# Patient Record
Sex: MALE | Race: WHITE | NOT HISPANIC OR LATINO | URBAN - METROPOLITAN AREA
[De-identification: names, ages, dates, MRNs, and addresses within clinical notes are randomized per-mention and may not be internally consistent; named-entity substitution may affect disease eponyms.]

---

## 2017-07-18 ENCOUNTER — INPATIENT (INPATIENT)
Facility: HOSPITAL | Age: 52
LOS: 0 days | Discharge: HOME | End: 2017-07-19
Attending: EMERGENCY MEDICINE

## 2017-07-18 DIAGNOSIS — R07.9 CHEST PAIN, UNSPECIFIED: ICD-10-CM

## 2017-07-18 DIAGNOSIS — R07.89 OTHER CHEST PAIN: ICD-10-CM

## 2017-07-18 DIAGNOSIS — F17.210 NICOTINE DEPENDENCE, CIGARETTES, UNCOMPLICATED: ICD-10-CM

## 2017-07-18 DIAGNOSIS — I10 ESSENTIAL (PRIMARY) HYPERTENSION: ICD-10-CM

## 2017-07-18 DIAGNOSIS — Z82.49 FAMILY HISTORY OF ISCHEMIC HEART DISEASE AND OTHER DISEASES OF THE CIRCULATORY SYSTEM: ICD-10-CM

## 2017-07-21 PROBLEM — Z00.00 ENCOUNTER FOR PREVENTIVE HEALTH EXAMINATION: Status: ACTIVE | Noted: 2017-07-21

## 2017-07-31 ENCOUNTER — OTHER (OUTPATIENT)
Age: 52
End: 2017-07-31

## 2017-07-31 ENCOUNTER — APPOINTMENT (OUTPATIENT)
Dept: CARDIOLOGY | Facility: CLINIC | Age: 52
End: 2017-07-31

## 2017-07-31 ENCOUNTER — RESULT CHARGE (OUTPATIENT)
Age: 52
End: 2017-07-31

## 2017-07-31 VITALS — HEIGHT: 71 IN

## 2017-07-31 VITALS
HEIGHT: 71 IN | WEIGHT: 187 LBS | HEART RATE: 86 BPM | SYSTOLIC BLOOD PRESSURE: 180 MMHG | DIASTOLIC BLOOD PRESSURE: 90 MMHG | BODY MASS INDEX: 26.18 KG/M2

## 2017-07-31 DIAGNOSIS — R94.31 ABNORMAL ELECTROCARDIOGRAM [ECG] [EKG]: ICD-10-CM

## 2017-07-31 DIAGNOSIS — E78.5 HYPERLIPIDEMIA, UNSPECIFIED: ICD-10-CM

## 2017-07-31 DIAGNOSIS — F17.200 NICOTINE DEPENDENCE, UNSPECIFIED, UNCOMPLICATED: ICD-10-CM

## 2017-07-31 DIAGNOSIS — I10 ESSENTIAL (PRIMARY) HYPERTENSION: ICD-10-CM

## 2017-07-31 DIAGNOSIS — Z82.49 FAMILY HISTORY OF ISCHEMIC HEART DISEASE AND OTHER DISEASES OF THE CIRCULATORY SYSTEM: ICD-10-CM

## 2017-07-31 DIAGNOSIS — Z80.9 FAMILY HISTORY OF MALIGNANT NEOPLASM, UNSPECIFIED: ICD-10-CM

## 2017-07-31 RX ORDER — HYDROCHLOROTHIAZIDE 25 MG/1
25 TABLET ORAL DAILY
Qty: 90 | Refills: 3 | Status: ACTIVE | COMMUNITY

## 2017-08-01 ENCOUNTER — OUTPATIENT (OUTPATIENT)
Dept: OUTPATIENT SERVICES | Facility: HOSPITAL | Age: 52
LOS: 1 days | Discharge: HOME | End: 2017-08-01

## 2017-08-01 DIAGNOSIS — E78.5 HYPERLIPIDEMIA, UNSPECIFIED: ICD-10-CM

## 2017-08-01 DIAGNOSIS — I10 ESSENTIAL (PRIMARY) HYPERTENSION: ICD-10-CM

## 2017-08-09 ENCOUNTER — APPOINTMENT (OUTPATIENT)
Dept: CARDIOLOGY | Facility: CLINIC | Age: 52
End: 2017-08-09

## 2017-11-28 ENCOUNTER — APPOINTMENT (OUTPATIENT)
Dept: CARDIOLOGY | Facility: CLINIC | Age: 52
End: 2017-11-28

## 2017-12-04 ENCOUNTER — APPOINTMENT (OUTPATIENT)
Dept: CARDIOLOGY | Facility: CLINIC | Age: 52
End: 2017-12-04

## 2018-06-14 ENCOUNTER — TRANSCRIPTION ENCOUNTER (OUTPATIENT)
Age: 53
End: 2018-06-14

## 2018-10-15 ENCOUNTER — EMERGENCY (EMERGENCY)
Facility: HOSPITAL | Age: 53
LOS: 0 days | Discharge: HOME | End: 2018-10-16
Attending: EMERGENCY MEDICINE | Admitting: EMERGENCY MEDICINE

## 2018-10-15 VITALS
DIASTOLIC BLOOD PRESSURE: 101 MMHG | HEART RATE: 98 BPM | SYSTOLIC BLOOD PRESSURE: 197 MMHG | TEMPERATURE: 98 F | RESPIRATION RATE: 22 BRPM | WEIGHT: 199.96 LBS | OXYGEN SATURATION: 99 % | HEIGHT: 70 IN

## 2018-10-15 LAB
ALBUMIN SERPL ELPH-MCNC: 4.2 G/DL — SIGNIFICANT CHANGE UP (ref 3.5–5.2)
ALP SERPL-CCNC: 101 U/L — SIGNIFICANT CHANGE UP (ref 30–115)
ALT FLD-CCNC: 66 U/L — HIGH (ref 0–41)
ANION GAP SERPL CALC-SCNC: 13 MMOL/L — SIGNIFICANT CHANGE UP (ref 7–14)
APTT BLD: 38.5 SEC — SIGNIFICANT CHANGE UP (ref 27–39.2)
AST SERPL-CCNC: 50 U/L — HIGH (ref 0–41)
BASOPHILS # BLD AUTO: 0.02 K/UL — SIGNIFICANT CHANGE UP (ref 0–0.2)
BASOPHILS NFR BLD AUTO: 0.2 % — SIGNIFICANT CHANGE UP (ref 0–1)
BILIRUB SERPL-MCNC: 1.2 MG/DL — SIGNIFICANT CHANGE UP (ref 0.2–1.2)
BUN SERPL-MCNC: 12 MG/DL — SIGNIFICANT CHANGE UP (ref 10–20)
CALCIUM SERPL-MCNC: 9 MG/DL — SIGNIFICANT CHANGE UP (ref 8.5–10.1)
CHLORIDE SERPL-SCNC: 99 MMOL/L — SIGNIFICANT CHANGE UP (ref 98–110)
CHOLEST SERPL-MCNC: 173 MG/DL — SIGNIFICANT CHANGE UP (ref 100–200)
CO2 SERPL-SCNC: 27 MMOL/L — SIGNIFICANT CHANGE UP (ref 17–32)
CREAT SERPL-MCNC: 0.9 MG/DL — SIGNIFICANT CHANGE UP (ref 0.7–1.5)
EOSINOPHIL # BLD AUTO: 0.1 K/UL — SIGNIFICANT CHANGE UP (ref 0–0.7)
EOSINOPHIL NFR BLD AUTO: 1.2 % — SIGNIFICANT CHANGE UP (ref 0–8)
GLUCOSE SERPL-MCNC: 104 MG/DL — HIGH (ref 70–99)
HCT VFR BLD CALC: 52.4 % — HIGH (ref 42–52)
HDLC SERPL-MCNC: 67 MG/DL — SIGNIFICANT CHANGE UP
HGB BLD-MCNC: 18.1 G/DL — CRITICAL HIGH (ref 14–18)
IMM GRANULOCYTES NFR BLD AUTO: 0.4 % — HIGH (ref 0.1–0.3)
INR BLD: 1.07 RATIO — SIGNIFICANT CHANGE UP (ref 0.65–1.3)
LIPID PNL WITH DIRECT LDL SERPL: 96 MG/DL — SIGNIFICANT CHANGE UP (ref 4–129)
LYMPHOCYTES # BLD AUTO: 1.95 K/UL — SIGNIFICANT CHANGE UP (ref 1.2–3.4)
LYMPHOCYTES # BLD AUTO: 24.1 % — SIGNIFICANT CHANGE UP (ref 20.5–51.1)
MCHC RBC-ENTMCNC: 32.6 PG — HIGH (ref 27–31)
MCHC RBC-ENTMCNC: 34.5 G/DL — SIGNIFICANT CHANGE UP (ref 32–37)
MCV RBC AUTO: 94.4 FL — HIGH (ref 80–94)
MONOCYTES # BLD AUTO: 0.62 K/UL — HIGH (ref 0.1–0.6)
MONOCYTES NFR BLD AUTO: 7.7 % — SIGNIFICANT CHANGE UP (ref 1.7–9.3)
NEUTROPHILS # BLD AUTO: 5.37 K/UL — SIGNIFICANT CHANGE UP (ref 1.4–6.5)
NEUTROPHILS NFR BLD AUTO: 66.4 % — SIGNIFICANT CHANGE UP (ref 42.2–75.2)
NRBC # BLD: 0 /100 WBCS — SIGNIFICANT CHANGE UP (ref 0–0)
PLATELET # BLD AUTO: 157 K/UL — SIGNIFICANT CHANGE UP (ref 130–400)
POTASSIUM SERPL-MCNC: 3.7 MMOL/L — SIGNIFICANT CHANGE UP (ref 3.5–5)
POTASSIUM SERPL-SCNC: 3.7 MMOL/L — SIGNIFICANT CHANGE UP (ref 3.5–5)
PROT SERPL-MCNC: 7.5 G/DL — SIGNIFICANT CHANGE UP (ref 6–8)
PROTHROM AB SERPL-ACNC: 12.3 SEC — SIGNIFICANT CHANGE UP (ref 9.95–12.87)
RBC # BLD: 5.55 M/UL — SIGNIFICANT CHANGE UP (ref 4.7–6.1)
RBC # FLD: 13 % — SIGNIFICANT CHANGE UP (ref 11.5–14.5)
SODIUM SERPL-SCNC: 139 MMOL/L — SIGNIFICANT CHANGE UP (ref 135–146)
TOTAL CHOLESTEROL/HDL RATIO MEASUREMENT: 2.6 RATIO — LOW (ref 4–5.5)
TRIGL SERPL-MCNC: 95 MG/DL — SIGNIFICANT CHANGE UP (ref 10–149)
TROPONIN T SERPL-MCNC: <0.01 NG/ML — SIGNIFICANT CHANGE UP
WBC # BLD: 8.09 K/UL — SIGNIFICANT CHANGE UP (ref 4.8–10.8)
WBC # FLD AUTO: 8.09 K/UL — SIGNIFICANT CHANGE UP (ref 4.8–10.8)

## 2018-10-15 RX ORDER — SODIUM CHLORIDE 9 MG/ML
1000 INJECTION INTRAMUSCULAR; INTRAVENOUS; SUBCUTANEOUS ONCE
Qty: 0 | Refills: 0 | Status: COMPLETED | OUTPATIENT
Start: 2018-10-15 | End: 2018-10-15

## 2018-10-15 RX ORDER — ACETAMINOPHEN 500 MG
975 TABLET ORAL ONCE
Qty: 0 | Refills: 0 | Status: COMPLETED | OUTPATIENT
Start: 2018-10-15 | End: 2018-10-15

## 2018-10-15 RX ADMIN — Medication 975 MILLIGRAM(S): at 22:00

## 2018-10-15 RX ADMIN — SODIUM CHLORIDE 1000 MILLILITER(S): 9 INJECTION INTRAMUSCULAR; INTRAVENOUS; SUBCUTANEOUS at 22:00

## 2018-10-15 NOTE — ED CDU PROVIDER INITIAL DAY NOTE - ATTENDING CONTRIBUTION TO CARE
54 yo male h/o HTN, current smoker , non-compliant with medications for a long time placed in EDOU for evaluation of intermittent CP..  Pain free at present , will continue with testing.

## 2018-10-15 NOTE — ED PROVIDER NOTE - MEDICAL DECISION MAKING DETAILS
Patient presented with chest pain, low risk. Initial work up in ED negative. Patient had outpatient nuclear stress last year which he reports as normal but patient is non-compliant. Will obs. HD stable, pain free at time of obs placement.

## 2018-10-15 NOTE — ED ADULT NURSE NOTE - OBJECTIVE STATEMENT
Patient c/o of dizziness, CP, high BP x1 day. States he took HCTZ for HTN, but stopped because of increased urination. denies SOB, diaphoresis, and pain radiating to arms.

## 2018-10-15 NOTE — ED PROVIDER NOTE - OBJECTIVE STATEMENT
52 yo M with PMHx of HTN-non-complaint with medication presents to the ED c/o lightheadedness, weakness and intermittent left sided chest pain. Chest pain has been going on for several days but lightheadedness and weakness developed today. Pt admits to recent stressors. He is daily smoker. Pt had normal nuclear stress test in July 2017, has not followed up with cardiology. He admits to associated headache. Pt denies fever, chills, nausea, vomiting, abdominal pain, diarrhea, dizziness, SOB, back pain, LOC, trauma, urinary symptoms, cough, calf pain/swelling, recent travel, recent surgery.

## 2018-10-15 NOTE — ED PROVIDER NOTE - REFUSAL OF SERVICE, MDM
Patient refusing to have stress test because he will only have CT and does not want to wait for CT to be done. Patient wants to go home. Patient states he will follow up with PMD for reevaltion and further treatment

## 2018-10-15 NOTE — ED PROVIDER NOTE - ATTENDING CONTRIBUTION TO CARE
53 year old male, pmhx HTN (non-compliant) presenting with a several day history of substernal chest pain that has been intermittent. Pain is substernal, non-radiating, non-pleuritic, associated with mild dyspnea with exertion. States he had a nuclear stress test 1 year ago which was normal but states he has not followed up with cardiology since then. Admits to mild headache as well but denies fevers, vision changes, weakness/numbness, confusion, URI symptoms, neck pain, back pain, cough, palpitations, nausea, vomiting, abdominal pain, diarrhea, constipation, blood in stool/dark stools, urinary symptoms, penile discharge/testicular pain, leg swelling, rash, recent travel or sick contacts.    Vital Signs: I have reviewed the initial vital signs.  Constitutional: NAD, well-nourished, appears stated age, no acute distress.  HEENT: Airway patent, moist MM, no erythema/swelling/deformity of oral structures. EOMI, PERRLA.  CV: regular rate, regular rhythm, well-perfused extremities, 2+ b/l DP and radial pulses equal.  Lungs: BCTA, no increased WOB.  ABD: NTND, no guarding or rebound, no pulsatile mass, no hernias.   MSK: Neck supple, nontender, nl ROM, no stepoff. Chest nontender. Back nontender in TLS spine or to b/l bony structures or flanks. Ext nontender, nl rom, no deformity.   INTEG: Skin warm, dry, no rash.  NEURO: A&Ox3, CN II-XII intact, normal strength 5/5 all 4 ext, nl sensation throughout, normal speech and coordination.  PSYCH: Calm, cooperative, normal affect and interaction.    Will check labs, EKG, CXR, re-eval.

## 2018-10-15 NOTE — ED PROVIDER NOTE - PHYSICAL EXAMINATION
VITAL SIGNS: I have reviewed nursing notes and confirm.  CONSTITUTIONAL: Well-developed; well-nourished; in no acute distress.  SKIN: Skin exam is warm and dry, no acute rash.  HEAD: Normocephalic; atraumatic.  EYES: Conjunctiva and sclera clear.  ENT: No nasal discharge; airway clear.   NECK: Supple; non tender.  CARD: S1, S2 normal; no murmurs, gallops, or rubs. Regular rate and rhythm.  RESP: No wheezes, rales or rhonchi. Speaking in full sentences.   ABD: Normal bowel sounds; soft; non-distended; non-tender; No rebound or guarding.   EXT: Normal ROM. No clubbing, cyanosis or edema. No calf swelling or TTP.   NEURO: Alert, oriented. Grossly unremarkable. No focal deficits. CN II-XII intact. No dysmetria. Sensation intact throughout. Strength 5/5 throughout. Gait steady.

## 2018-10-15 NOTE — ED CDU PROVIDER INITIAL DAY NOTE - PHYSICAL EXAMINATION
VITAL SIGNS: I have reviewed nursing notes and confirm.  CONSTITUTIONAL: Well-developed; well-nourished; in no acute distress.  SKIN: Skin exam is warm and dry, no acute rash.  HEAD: Normocephalic; atraumatic.  EYES: PERRL, EOM intact; conjunctiva and sclera clear.  ENT: No nasal discharge; airway clear.   NECK: Supple; non tender.  CARD: S1, S2 normal; no murmurs, gallops, or rubs. Regular rate and rhythm.  RESP: No wheezes, rales or rhonchi. Speaking in full sentences.   ABD: Normal bowel sounds; soft; non-distended; non-tender; No rebound or guarding.   EXT: Normal ROM. No clubbing, cyanosis or edema. No calf TTP/swelling.   NEURO: Alert, oriented. Grossly unremarkable. No focal deficits. CN II-XII intact. No dysmetria. No ataxia. Sensation intact throughout. Strength 5/5 throughout. Gait steady.

## 2018-10-16 VITALS — DIASTOLIC BLOOD PRESSURE: 99 MMHG | SYSTOLIC BLOOD PRESSURE: 183 MMHG | HEART RATE: 71 BPM

## 2018-10-16 LAB — TROPONIN T SERPL-MCNC: <0.01 NG/ML — SIGNIFICANT CHANGE UP

## 2018-10-16 RX ORDER — ADENOSINE 3 MG/ML
60 INJECTION INTRAVENOUS ONCE
Qty: 0 | Refills: 0 | Status: DISCONTINUED | OUTPATIENT
Start: 2018-10-16 | End: 2018-10-15

## 2018-10-16 RX ORDER — METOPROLOL TARTRATE 50 MG
50 TABLET ORAL ONCE
Qty: 0 | Refills: 0 | Status: COMPLETED | OUTPATIENT
Start: 2018-10-16 | End: 2018-10-16

## 2018-10-16 RX ORDER — METOCLOPRAMIDE HCL 10 MG
10 TABLET ORAL ONCE
Qty: 0 | Refills: 0 | Status: COMPLETED | OUTPATIENT
Start: 2018-10-16 | End: 2018-10-16

## 2018-10-16 RX ADMIN — Medication 10 MILLIGRAM(S): at 00:54

## 2018-10-16 RX ADMIN — Medication 50 MILLIGRAM(S): at 00:54

## 2018-10-16 NOTE — ED CDU PROVIDER DISPOSITION NOTE - CLINICAL COURSE
52 yo male placed in EDOU for work up of chest pain,  Testing included ECG, cardiac enzymes, CT head ( for headache), patient was given meds for BP control and scheduled for CCTA .  Due to technical difficulties CCTA was cancelled and nuclear test ordered to which patient consented, but later refused ( has capacity).  He had an uneventful EDOU stay, was advised to follow up with his PMD ASAP for management of hypertension; left AMA.

## 2018-10-16 NOTE — ED CDU PROVIDER SUBSEQUENT DAY NOTE - HISTORY
Pt placed in OBS for chest pain. Cardiac enzymes negative x 2. Currently asymptomatic. Pt to go for CCTA.

## 2018-10-16 NOTE — ED CDU PROVIDER SUBSEQUENT DAY NOTE - ATTENDING CONTRIBUTION TO CARE
52 yo male placed in EDOU for evaluation of intermittent chest pain.  Will continue with observation and testing.

## 2018-10-16 NOTE — ED CDU PROVIDER SUBSEQUENT DAY NOTE - PROGRESS NOTE DETAILS
Pt Hx HTN noncompliant with medication, smoker asymptomatic at this time. Patient states CP and SOB has resolved. Awaiting nuclear pharm test since patient received metoprolol for CCTA which was cancelled. Patient is resting comfortably in bed RRR, CTA B/L no LE edema B/L abdomen soft non tender. Endorsed to me this morning.  Appears well, resting comfortably , pain free at present, waiting for provocative testing. Patient refused nuclear stress test, wanted only CT scan. Will leave AMA, risks of leaving AMA including death and disability were explained to the patient, he is A&Ox3 and has capacity.  Aware he can return to ER at any time.

## 2018-10-17 DIAGNOSIS — I10 ESSENTIAL (PRIMARY) HYPERTENSION: ICD-10-CM

## 2018-10-17 DIAGNOSIS — R51 HEADACHE: ICD-10-CM

## 2018-10-17 DIAGNOSIS — R07.89 OTHER CHEST PAIN: ICD-10-CM

## 2018-10-17 DIAGNOSIS — R07.9 CHEST PAIN, UNSPECIFIED: ICD-10-CM

## 2018-10-17 DIAGNOSIS — R53.1 WEAKNESS: ICD-10-CM

## 2018-10-17 DIAGNOSIS — F17.210 NICOTINE DEPENDENCE, CIGARETTES, UNCOMPLICATED: ICD-10-CM

## 2021-02-07 ENCOUNTER — EMERGENCY (EMERGENCY)
Facility: HOSPITAL | Age: 56
LOS: 0 days | Discharge: HOME | End: 2021-02-07
Attending: STUDENT IN AN ORGANIZED HEALTH CARE EDUCATION/TRAINING PROGRAM | Admitting: EMERGENCY MEDICINE
Payer: SELF-PAY

## 2021-02-07 VITALS
SYSTOLIC BLOOD PRESSURE: 210 MMHG | DIASTOLIC BLOOD PRESSURE: 86 MMHG | HEIGHT: 70 IN | OXYGEN SATURATION: 99 % | TEMPERATURE: 98 F | WEIGHT: 186.95 LBS | HEART RATE: 75 BPM | RESPIRATION RATE: 18 BRPM

## 2021-02-07 DIAGNOSIS — F17.200 NICOTINE DEPENDENCE, UNSPECIFIED, UNCOMPLICATED: ICD-10-CM

## 2021-02-07 DIAGNOSIS — Z20.822 CONTACT WITH AND (SUSPECTED) EXPOSURE TO COVID-19: ICD-10-CM

## 2021-02-07 DIAGNOSIS — R07.9 CHEST PAIN, UNSPECIFIED: ICD-10-CM

## 2021-02-07 DIAGNOSIS — E78.5 HYPERLIPIDEMIA, UNSPECIFIED: ICD-10-CM

## 2021-02-07 DIAGNOSIS — I10 ESSENTIAL (PRIMARY) HYPERTENSION: ICD-10-CM

## 2021-02-07 DIAGNOSIS — R07.89 OTHER CHEST PAIN: ICD-10-CM

## 2021-02-07 LAB
ALBUMIN SERPL ELPH-MCNC: 4.1 G/DL — SIGNIFICANT CHANGE UP (ref 3.5–5.2)
ALP SERPL-CCNC: 86 U/L — SIGNIFICANT CHANGE UP (ref 30–115)
ALT FLD-CCNC: 24 U/L — SIGNIFICANT CHANGE UP (ref 0–41)
ANION GAP SERPL CALC-SCNC: 9 MMOL/L — SIGNIFICANT CHANGE UP (ref 7–14)
AST SERPL-CCNC: 24 U/L — SIGNIFICANT CHANGE UP (ref 0–41)
BASOPHILS # BLD AUTO: 0.04 K/UL — SIGNIFICANT CHANGE UP (ref 0–0.2)
BASOPHILS NFR BLD AUTO: 0.6 % — SIGNIFICANT CHANGE UP (ref 0–1)
BILIRUB SERPL-MCNC: 0.5 MG/DL — SIGNIFICANT CHANGE UP (ref 0.2–1.2)
BUN SERPL-MCNC: 14 MG/DL — SIGNIFICANT CHANGE UP (ref 10–20)
CALCIUM SERPL-MCNC: 8.6 MG/DL — SIGNIFICANT CHANGE UP (ref 8.5–10.1)
CHLORIDE SERPL-SCNC: 98 MMOL/L — SIGNIFICANT CHANGE UP (ref 98–110)
CO2 SERPL-SCNC: 25 MMOL/L — SIGNIFICANT CHANGE UP (ref 17–32)
CREAT SERPL-MCNC: 0.9 MG/DL — SIGNIFICANT CHANGE UP (ref 0.7–1.5)
EOSINOPHIL # BLD AUTO: 0.06 K/UL — SIGNIFICANT CHANGE UP (ref 0–0.7)
EOSINOPHIL NFR BLD AUTO: 0.9 % — SIGNIFICANT CHANGE UP (ref 0–8)
GLUCOSE SERPL-MCNC: 104 MG/DL — HIGH (ref 70–99)
HCT VFR BLD CALC: 52.3 % — HIGH (ref 42–52)
HGB BLD-MCNC: 18.5 G/DL — HIGH (ref 14–18)
IMM GRANULOCYTES NFR BLD AUTO: 0.6 % — HIGH (ref 0.1–0.3)
LYMPHOCYTES # BLD AUTO: 1.07 K/UL — LOW (ref 1.2–3.4)
LYMPHOCYTES # BLD AUTO: 16.5 % — LOW (ref 20.5–51.1)
MAGNESIUM SERPL-MCNC: 2 MG/DL — SIGNIFICANT CHANGE UP (ref 1.8–2.4)
MCHC RBC-ENTMCNC: 34.1 PG — HIGH (ref 27–31)
MCHC RBC-ENTMCNC: 35.4 G/DL — SIGNIFICANT CHANGE UP (ref 32–37)
MCV RBC AUTO: 96.5 FL — HIGH (ref 80–94)
MONOCYTES # BLD AUTO: 0.66 K/UL — HIGH (ref 0.1–0.6)
MONOCYTES NFR BLD AUTO: 10.2 % — HIGH (ref 1.7–9.3)
NEUTROPHILS # BLD AUTO: 4.63 K/UL — SIGNIFICANT CHANGE UP (ref 1.4–6.5)
NEUTROPHILS NFR BLD AUTO: 71.2 % — SIGNIFICANT CHANGE UP (ref 42.2–75.2)
NRBC # BLD: 0 /100 WBCS — SIGNIFICANT CHANGE UP (ref 0–0)
PLATELET # BLD AUTO: 36 K/UL — LOW (ref 130–400)
POTASSIUM SERPL-MCNC: 3.8 MMOL/L — SIGNIFICANT CHANGE UP (ref 3.5–5)
POTASSIUM SERPL-SCNC: 3.8 MMOL/L — SIGNIFICANT CHANGE UP (ref 3.5–5)
PROT SERPL-MCNC: 6.9 G/DL — SIGNIFICANT CHANGE UP (ref 6–8)
RBC # BLD: 5.42 M/UL — SIGNIFICANT CHANGE UP (ref 4.7–6.1)
RBC # FLD: 13.9 % — SIGNIFICANT CHANGE UP (ref 11.5–14.5)
SARS-COV-2 RNA SPEC QL NAA+PROBE: SIGNIFICANT CHANGE UP
SODIUM SERPL-SCNC: 132 MMOL/L — LOW (ref 135–146)
TROPONIN T SERPL-MCNC: <0.01 NG/ML — SIGNIFICANT CHANGE UP
WBC # BLD: 6.5 K/UL — SIGNIFICANT CHANGE UP (ref 4.8–10.8)
WBC # FLD AUTO: 6.5 K/UL — SIGNIFICANT CHANGE UP (ref 4.8–10.8)

## 2021-02-07 PROCEDURE — 71046 X-RAY EXAM CHEST 2 VIEWS: CPT | Mod: 26

## 2021-02-07 PROCEDURE — 99220: CPT

## 2021-02-07 PROCEDURE — 93010 ELECTROCARDIOGRAM REPORT: CPT | Mod: 76

## 2021-02-07 PROCEDURE — 70450 CT HEAD/BRAIN W/O DYE: CPT | Mod: 26

## 2021-02-07 RX ORDER — ASPIRIN/CALCIUM CARB/MAGNESIUM 324 MG
325 TABLET ORAL ONCE
Refills: 0 | Status: COMPLETED | OUTPATIENT
Start: 2021-02-07 | End: 2021-02-07

## 2021-02-07 RX ORDER — METOPROLOL TARTRATE 50 MG
100 TABLET ORAL ONCE
Refills: 0 | Status: DISCONTINUED | OUTPATIENT
Start: 2021-02-07 | End: 2021-02-07

## 2021-02-07 RX ORDER — HYDROCHLOROTHIAZIDE 25 MG
25 TABLET ORAL ONCE
Refills: 0 | Status: COMPLETED | OUTPATIENT
Start: 2021-02-07 | End: 2021-02-07

## 2021-02-07 RX ORDER — METOPROLOL TARTRATE 50 MG
100 TABLET ORAL ONCE
Refills: 0 | Status: COMPLETED | OUTPATIENT
Start: 2021-02-07 | End: 2021-02-07

## 2021-02-07 RX ADMIN — Medication 25 MILLIGRAM(S): at 17:05

## 2021-02-07 RX ADMIN — Medication 100 MILLIGRAM(S): at 17:05

## 2021-02-07 RX ADMIN — Medication 325 MILLIGRAM(S): at 15:37

## 2021-02-07 NOTE — ED PROVIDER NOTE - OBJECTIVE STATEMENT
55 y.o male w/ hx of HTN, HLD alcohol abuse presents to the ED for evaluation chest pain and lightheaded sensation.  Chest pain for several years, L sided, intermittent, non-exertional, nonpleuritic, mild severity.  Also reports intermittent lightheaded sensation, usually worse w/ changes in position. Pt denies fever, chills, nausea, vomiting, abdominal pain, diarrhea, dizziness, SOB, back pain, LOC, trauma, urinary symptoms, cough, calf pain/swelling, recent travel, recent surgery, edema of lower extremities. + FHx cardiac disease, no recent cardiac work up. Daily smoker, 3 alcoholic drinks daily. 54 y/o male with a PMH of of HTN, HLD noncompliant with his HCTZ, alcohol abuse presents to the ED for evaluation chest pain and lightheaded sensation.  Chest pain for several years, L sided, intermittent, non-exertional, nonpleuritic, mild severity.  Also reports intermittent lightheaded sensation, usually worse w/ changes in position. pt reports he was seen by a cardiologist years ago. pt reports he was seen in the ED for evaluation of the same symptoms in 2018 was placed in obs and nuclear stress test was ordered by pt AMA at the time. Pt denies fever, chills, nausea, vomiting, abdominal pain, diarrhea, dizziness, SOB, back pain, LOC, trauma, urinary symptoms, cough, calf pain/swelling, recent travel, recent surgery, edema of lower extremities. + FHx cardiac disease, no recent cardiac work up. Daily smoker, 3 alcoholic drinks daily.

## 2021-02-07 NOTE — ED ADULT NURSE NOTE - OBJECTIVE STATEMENT
pt complains of chest pain for last couple of days. pt. alert and oriented times four. vss. pt. placed on continuos cardiac and pox monitoring hr 67. labs drawn and sent. Po aspirin given. EKG performed in triage and evaluated by MD. Bed alarm in place. pt. with steady gait no ataxia noted. will monitor.

## 2021-02-07 NOTE — ED PROVIDER NOTE - PROGRESS NOTE DETAILS
FF: pt made aware of thrombocytopenia. denies blood in the urine, blood in the stool, easy bleeding or bruising. pt advised to f/u with heme/onc

## 2021-02-07 NOTE — ED PROVIDER NOTE - NS ED ROS FT
Constitutional: See HPI.  Eyes: No visual changes, eye pain or discharge.   ENMT: No hearing changes, pain, discharge or infections.   Cardiac: + chest pain, + lightheaded sensation. No SOB or edema. No chest pain with exertion.  Respiratory: No cough or respiratory distress.   GI: No nausea, vomiting, diarrhea or abdominal pain.  : No dysuria, frequency or burning. No Discharge  MS: No myalgia, muscle weakness, joint pain or back pain.  Neuro: No headache or weakness.  Skin: No skin rash.  Except as documented in the HPI, all other systems are negative.

## 2021-02-07 NOTE — ED PROVIDER NOTE - CARE PLAN
Principal Discharge DX:	Chest pain  Secondary Diagnosis:	Dizziness   Principal Discharge DX:	Chest pain  Secondary Diagnosis:	Dizziness  Secondary Diagnosis:	Thrombocytopenia   Principal Discharge DX:	Chest pain  Secondary Diagnosis:	Dizziness  Secondary Diagnosis:	Thrombocytopenia  Secondary Diagnosis:	HTN (hypertension)  Secondary Diagnosis:	Noncompliance with medication regimen

## 2021-02-07 NOTE — ED CDU PROVIDER INITIAL DAY NOTE - FAMILY HISTORY
Sibling  Still living? Yes, Estimated age: Age Unknown  Family history of atherosclerosis, Age at diagnosis: Age Unknown

## 2021-02-07 NOTE — ED CDU PROVIDER INITIAL DAY NOTE - OBJECTIVE STATEMENT
55 y.o male w/ hx of HTN, HLD alcohol abuse presents to the ED for evaluation chest pain and lightheaded sensation.  Chest pain for several years, L sided, intermittent, non-exertional, nonpleuritic, mild severity.  Also reports intermittent lightheaded sensation, usually worse w/ changes in position. Pt denies  nausea, vomiting, abdominal pain, dizziness, headache, SOB, back pain, LOC, trauma, cough, calf pain/swelling, recent travel, recent surgery. + FHx cardiac disease, no recent cardiac work up. Daily smoker, 3 alcoholic drinks daily. First trop/ekg wnl.  placed in obs for echo and stress test.  not complaint w/ home meds.

## 2021-02-07 NOTE — ED CDU PROVIDER INITIAL DAY NOTE - PROGRESS NOTE DETAILS
Pts platelets noted to be 35 on automated platelet count result @3pm today--- was normal in past... called lab and asked for manual count-- they are going to do count now. pt relates he as been having epistaxis that self resolves daily x 3 wks. He relates that he did have CP today, but mostly came in because he had sudden onset lightheadedness when he stood from a seated position earlier day and it scared him. PE: neuro exam normal including romberg, normal gait. ordered CT head, which was neg for acute path.  discussed with lab, on manual peripheral they relate no blasts or other abnormal cells, and that platelet count is confirmed to be 35. pt aware that this will need follow up.

## 2021-02-07 NOTE — ED PROVIDER NOTE - CLINICAL SUMMARY MEDICAL DECISION MAKING FREE TEXT BOX
55yoM with h/o HTN, HLD, alcohol abuse, presents with intermittent few-second episodes of lightheadedness and separate sharp nonradiating nonexertional chest pain x 2 yrs. Denies SOB, leg pain or swelling, cough, fever, weakness, vomiting, vertigo, blurry vision, and all other symptoms. On exam, afebrile, hemodynamically stable, saturating well, NAD, well appearing, laying comfortably in bed, head NCAT, EOMI, anicteric, MMM, no JVD, RRR, nml S1/S2, no m/r/g, lungs CTAB, no w/r/r, abd soft, NT, ND, nml BS, no rebound or guarding, AAO, CN's 3-12 grossly intact, VANEGAS spontaneously, no leg cyanosis or edema, 2+ symm radial pulses, skin warm, well perfused, no rashes or hives. Character low suspicion for PE and no e/o DVT or tachycardia/hypoxia. Character low suspicion for dissection and no murmur or pulse asymmetry. Noted CXR read - no cough or fever and low suspicion for PNA. No e/o PTX. No e/o fluid overload. No significant arrhythmia or e/o aortic outflow obstruction. No anemia or gross electrolyte abnormalities. Also noted low plts, no e/o bleeding. Character low suspicion for ACS and ECG unchanged from 2 yrs ago and trop negative however high risk and will obs for ACS r/o. NAD, well appearing, asymptomatic at this time. Given his home HCTZ and ASA.

## 2021-02-07 NOTE — ED PROVIDER NOTE - DATE/TIME 1
Patient came into office today to check in for appointment to [facility]. His appointment was not for today. I informed him that his appt was on 12/1/2020 patient became upset and said that he was here yesterday and that I told him [facility] was closed and that it was moved to today. Patient was not here yesterday he was actually here on 11/30/2020 when his appt was originally scheduled for but was not on schedule when he attempted to check in. I looked at past appointments and told him it stated he ok'd  to change his appt to the next day 12/1 due to [facility] being closed due to no NP here on 11/30/2020. We tried to reschedule but patient was very upset and said he was not going to reschedule.
07-Feb-2021 16:59

## 2021-02-08 VITALS
SYSTOLIC BLOOD PRESSURE: 170 MMHG | HEART RATE: 60 BPM | OXYGEN SATURATION: 98 % | RESPIRATION RATE: 16 BRPM | TEMPERATURE: 99 F | DIASTOLIC BLOOD PRESSURE: 84 MMHG

## 2021-02-08 LAB — TROPONIN T SERPL-MCNC: <0.01 NG/ML — SIGNIFICANT CHANGE UP

## 2021-02-08 PROCEDURE — 93306 TTE W/DOPPLER COMPLETE: CPT | Mod: 26

## 2021-02-08 PROCEDURE — 78452 HT MUSCLE IMAGE SPECT MULT: CPT | Mod: 26

## 2021-02-08 PROCEDURE — 93018 CV STRESS TEST I&R ONLY: CPT

## 2021-02-08 PROCEDURE — 75571 CT HRT W/O DYE W/CA TEST: CPT | Mod: 26

## 2021-02-08 PROCEDURE — 93016 CV STRESS TEST SUPVJ ONLY: CPT

## 2021-02-08 PROCEDURE — 93010 ELECTROCARDIOGRAM REPORT: CPT

## 2021-02-08 PROCEDURE — 99217: CPT

## 2021-02-08 RX ORDER — METOPROLOL TARTRATE 50 MG
100 TABLET ORAL ONCE
Refills: 0 | Status: COMPLETED | OUTPATIENT
Start: 2021-02-08 | End: 2021-02-08

## 2021-02-08 RX ORDER — ADENOSINE 3 MG/ML
60 INJECTION INTRAVENOUS ONCE
Refills: 0 | Status: COMPLETED | OUTPATIENT
Start: 2021-02-08 | End: 2021-02-08

## 2021-02-08 RX ADMIN — ADENOSINE 600 MILLIGRAM(S): 3 INJECTION INTRAVENOUS at 15:20

## 2021-02-08 RX ADMIN — Medication 100 MILLIGRAM(S): at 08:16

## 2021-02-08 NOTE — ED CDU PROVIDER DISPOSITION NOTE - CLINICAL COURSE
In Obs- serial trop negative, ekg unchanged. echo w/ minimal findings- no acute intervention needed, pt will req outpt f/u, nuc stress nml. pt stable for dc w/ outpt f/u

## 2021-02-08 NOTE — ED CDU PROVIDER DISPOSITION NOTE - PROVIDER TOKENS
PROVIDER:[TOKEN:[[id number]],FOLLOWUP:[Routine]],FREE:[LAST:[YOUR PRIMARY CARE PHYSICIAN],PHONE:[(   )    -],FAX:[(   )    -],FOLLOWUP:[1-3 Days]]

## 2021-02-08 NOTE — ED CDU PROVIDER DISPOSITION NOTE - NSFOLLOWUPINSTRUCTIONS_ED_ALL_ED_FT

## 2021-02-08 NOTE — ED CDU PROVIDER DISPOSITION NOTE - CARE PROVIDER_API CALL
Jose Roberto Mitchell  Cardiovascular Disease  [address] [place] [address]  Phone: [phone]  Fax: [phone]  Follow Up Time: Routine    YOUR PRIMARY CARE PHYSICIAN,   Phone: (   )    -  Fax: (   )    -  Follow Up Time: 1-3 Days

## 2021-02-08 NOTE — ED CDU PROVIDER SUBSEQUENT DAY NOTE - ATTENDING CONTRIBUTION TO CARE
55 y.o male w/ hx of HTN, HLD alcohol abuse presents for eval of episodes of L sided sharp chest pain and episodes lightheadedness. CP has been going on for a longer period of time. pain not pleuritic, exertional or related to food. pt states yesterday he felt some episodes of lightheadedness w/ standing up. no f/c/n/v/ap/leg swelling/calf pain/hemoptysis/cough.  pt denes w/u for CAD.    vss  gen- NAD, aaox3  card-rrr  lungs-ctab, no wheezing or rhonchi  abd-sntnd, no guarding or rebound  neuro- full str/sensation, cn ii-xii grossly intact, normal coordination

## 2021-02-08 NOTE — ED CDU PROVIDER DISPOSITION NOTE - ATTENDING CONTRIBUTION TO CARE
55 y.o male w/ hx of HTN, HLD alcohol abuse presents for eval of episodes of L sided sharp chest pain and episodes lightheadedness. CP has been going on for a longer period of time. pain not pleuritic, exertional or related to food. pt states yesterday he felt some episodes of lightheadedness w/ standing up. no f/c/n/v/ap/leg swelling/calf pain/hemoptysis/cough.  pt denes w/u for CAD.

## 2021-02-08 NOTE — ED CDU PROVIDER SUBSEQUENT DAY NOTE - HISTORY
resting comfortably, no complaints. lightheadedness/chest pain has not returned in duration of time in ed.

## 2021-02-08 NOTE — ED CDU PROVIDER DISPOSITION NOTE - PATIENT PORTAL LINK FT
You can access the FollowMyHealth Patient Portal offered by [facility] by registering at the following website: [email or web address]. By joining [employer]’s FollowMyHealth portal, you will also be able to view your health information using other applications (apps) compatible with our system.

## 2021-05-20 NOTE — ED CDU PROVIDER DISPOSITION NOTE - NS ED MD EM OBS STAY GREATER 8
[id number] - Moderate Complexity Retention Suture Text: Retention sutures were placed to support the closure and prevent dehiscence.

## 2022-02-15 ENCOUNTER — EMERGENCY (EMERGENCY)
Facility: HOSPITAL | Age: 57
LOS: 0 days | Discharge: HOME | End: 2022-02-16
Attending: EMERGENCY MEDICINE | Admitting: EMERGENCY MEDICINE
Payer: COMMERCIAL

## 2022-02-15 VITALS
SYSTOLIC BLOOD PRESSURE: 211 MMHG | TEMPERATURE: 97 F | DIASTOLIC BLOOD PRESSURE: 106 MMHG | WEIGHT: 186.07 LBS | HEART RATE: 95 BPM | HEIGHT: 70 IN | RESPIRATION RATE: 18 BRPM | OXYGEN SATURATION: 97 %

## 2022-02-15 DIAGNOSIS — I10 ESSENTIAL (PRIMARY) HYPERTENSION: ICD-10-CM

## 2022-02-15 DIAGNOSIS — F17.200 NICOTINE DEPENDENCE, UNSPECIFIED, UNCOMPLICATED: ICD-10-CM

## 2022-02-15 DIAGNOSIS — R07.89 OTHER CHEST PAIN: ICD-10-CM

## 2022-02-15 LAB
ALBUMIN SERPL ELPH-MCNC: 3.6 G/DL — SIGNIFICANT CHANGE UP (ref 3.5–5.2)
ALP SERPL-CCNC: 89 U/L — SIGNIFICANT CHANGE UP (ref 30–115)
ALT FLD-CCNC: 51 U/L — HIGH (ref 0–41)
ANION GAP SERPL CALC-SCNC: 12 MMOL/L — SIGNIFICANT CHANGE UP (ref 7–14)
AST SERPL-CCNC: 65 U/L — HIGH (ref 0–41)
BASOPHILS # BLD AUTO: 0.04 K/UL — SIGNIFICANT CHANGE UP (ref 0–0.2)
BASOPHILS NFR BLD AUTO: 0.6 % — SIGNIFICANT CHANGE UP (ref 0–1)
BILIRUB SERPL-MCNC: 1.6 MG/DL — HIGH (ref 0.2–1.2)
BUN SERPL-MCNC: 10 MG/DL — SIGNIFICANT CHANGE UP (ref 10–20)
CALCIUM SERPL-MCNC: 8.6 MG/DL — SIGNIFICANT CHANGE UP (ref 8.5–10.1)
CHLORIDE SERPL-SCNC: 97 MMOL/L — LOW (ref 98–110)
CO2 SERPL-SCNC: 26 MMOL/L — SIGNIFICANT CHANGE UP (ref 17–32)
CREAT SERPL-MCNC: 0.8 MG/DL — SIGNIFICANT CHANGE UP (ref 0.7–1.5)
EOSINOPHIL # BLD AUTO: 0.09 K/UL — SIGNIFICANT CHANGE UP (ref 0–0.7)
EOSINOPHIL NFR BLD AUTO: 1.3 % — SIGNIFICANT CHANGE UP (ref 0–8)
GLUCOSE SERPL-MCNC: 105 MG/DL — HIGH (ref 70–99)
HCT VFR BLD CALC: 52 % — SIGNIFICANT CHANGE UP (ref 42–52)
HGB BLD-MCNC: 18.5 G/DL — HIGH (ref 14–18)
IMM GRANULOCYTES NFR BLD AUTO: 0.7 % — HIGH (ref 0.1–0.3)
LYMPHOCYTES # BLD AUTO: 1.18 K/UL — LOW (ref 1.2–3.4)
LYMPHOCYTES # BLD AUTO: 17 % — LOW (ref 20.5–51.1)
MCHC RBC-ENTMCNC: 34.1 PG — HIGH (ref 27–31)
MCHC RBC-ENTMCNC: 35.6 G/DL — SIGNIFICANT CHANGE UP (ref 32–37)
MCV RBC AUTO: 95.8 FL — HIGH (ref 80–94)
MONOCYTES # BLD AUTO: 0.67 K/UL — HIGH (ref 0.1–0.6)
MONOCYTES NFR BLD AUTO: 9.7 % — HIGH (ref 1.7–9.3)
NEUTROPHILS # BLD AUTO: 4.9 K/UL — SIGNIFICANT CHANGE UP (ref 1.4–6.5)
NEUTROPHILS NFR BLD AUTO: 70.7 % — SIGNIFICANT CHANGE UP (ref 42.2–75.2)
NRBC # BLD: 0 /100 WBCS — SIGNIFICANT CHANGE UP (ref 0–0)
PLATELET # BLD AUTO: 28 K/UL — LOW (ref 130–400)
POTASSIUM SERPL-MCNC: 3.2 MMOL/L — LOW (ref 3.5–5)
POTASSIUM SERPL-SCNC: 3.2 MMOL/L — LOW (ref 3.5–5)
PROT SERPL-MCNC: 6.9 G/DL — SIGNIFICANT CHANGE UP (ref 6–8)
RBC # BLD: 5.43 M/UL — SIGNIFICANT CHANGE UP (ref 4.7–6.1)
RBC # FLD: 13 % — SIGNIFICANT CHANGE UP (ref 11.5–14.5)
SODIUM SERPL-SCNC: 135 MMOL/L — SIGNIFICANT CHANGE UP (ref 135–146)
TROPONIN T SERPL-MCNC: <0.01 NG/ML — SIGNIFICANT CHANGE UP
WBC # BLD: 6.93 K/UL — SIGNIFICANT CHANGE UP (ref 4.8–10.8)
WBC # FLD AUTO: 6.93 K/UL — SIGNIFICANT CHANGE UP (ref 4.8–10.8)

## 2022-02-15 PROCEDURE — 99285 EMERGENCY DEPT VISIT HI MDM: CPT

## 2022-02-15 PROCEDURE — 93010 ELECTROCARDIOGRAM REPORT: CPT

## 2022-02-15 NOTE — ED PROVIDER NOTE - PATIENT PORTAL LINK FT
You can access the FollowMyHealth Patient Portal offered by [facility] by registering at the following website: [email or web address]. By joining [facility]’s FollowMyHealth portal, you will also be able to view your health information using other applications (apps) compatible with our system.

## 2022-02-15 NOTE — ED PROVIDER NOTE - NS ED ROS FT
Constitutional: (-) fever (-) chills (-) lightheadedness   Eyes/ENT: (-) blurry vision, (-) epistaxis (-) rhinorrhea (-) nasal congestion  Cardiovascular: (+) chest pain, (-) syncope (-) palpitations   Respiratory: (-) cough, (-) shortness of breath (-) pleurisy   Gastrointestinal: (-) vomiting, (-) diarrhea (-) abdominal pain (-) nausea (-) anorexia  Musculoskeletal: (-) neck pain, (-) back pain, (-) joint pain (-) joint swelling (-) painful ROM  Integumentary: (-) rash, (-) edema (-) lacerations (-) pruritis   Neurological: (-) headache, (-) altered mental status (-) LOC (-) dizziness (-) paresthesias (-) gait abnormalities   Psychiatric: (-) hallucinations (-) SI (-) HI  Allergic/Immunologic: (-) pruritus

## 2022-02-15 NOTE — ED PROVIDER NOTE - NSICDXFAMILYHX_GEN_ALL_CORE_FT
FAMILY HISTORY:  Sibling  Still living? Yes, Estimated age: Age Unknown  Family history of atherosclerosis, Age at diagnosis: Age Unknown

## 2022-02-15 NOTE — ED PROVIDER NOTE - NSFOLLOWUPINSTRUCTIONS_ED_ALL_ED_FT
PLEASE MAKE AN APPOINTMENT WITH YOUR PRIMARY DOCTOR AND YOUR ESTABLISHED CARDIOLOGIST WITHIN 1-3 DAYS OF YOUR ER VISIT. RETURN TO THE ER IF YOUR SYMPTOMS RETURN AND/OR WORSEN.    Hypertension  Hypertension, commonly called high blood pressure, is when the force of blood pumping through the arteries is too strong. The arteries are the blood vessels that carry blood from the heart throughout the body. Hypertension forces the heart to work harder to pump blood and may cause arteries to become narrow or stiff. Having untreated or uncontrolled hypertension can cause heart attacks, strokes, kidney disease, and other problems.    A blood pressure reading consists of a higher number over a lower number. Ideally, your blood pressure should be below 120/80. The first ("top") number is called the systolic pressure. It is a measure of the pressure in your arteries as your heart beats. The second ("bottom") number is called the diastolic pressure. It is a measure of the pressure in your arteries as the heart relaxes.    What are the causes?  The cause of this condition is not known.    What increases the risk?  Some risk factors for high blood pressure are under your control. Others are not.    Factors you can change     Smoking.  Having type 2 diabetes mellitus, high cholesterol, or both.  Not getting enough exercise or physical activity.  Being overweight.  Having too much fat, sugar, calories, or salt (sodium) in your diet.  Drinking too much alcohol.  Factors that are difficult or impossible to change     Having chronic kidney disease.  Having a family history of high blood pressure.  Age. Risk increases with age.  Race. You may be at higher risk if you are -American.  Gender. Men are at higher risk than women before age 45. After age 65, women are at higher risk than men.  Having obstructive sleep apnea.  Stress.  What are the signs or symptoms?  Extremely high blood pressure (hypertensive crisis) may cause:    Headache.  Anxiety.  Shortness of breath.  Nosebleed.  Nausea and vomiting.  Severe chest pain.  Jerky movements you cannot control (seizures).    How is this diagnosed?  This condition is diagnosed by measuring your blood pressure while you are seated, with your arm resting on a surface. The cuff of the blood pressure monitor will be placed directly against the skin of your upper arm at the level of your heart. It should be measured at least twice using the same arm. Certain conditions can cause a difference in blood pressure between your right and left arms.    Certain factors can cause blood pressure readings to be lower or higher than normal (elevated) for a short period of time:    When your blood pressure is higher when you are in a health care provider's office than when you are at home, this is called white coat hypertension. Most people with this condition do not need medicines.  When your blood pressure is higher at home than when you are in a health care provider's office, this is called masked hypertension. Most people with this condition may need medicines to control blood pressure.    If you have a high blood pressure reading during one visit or you have normal blood pressure with other risk factors:    You may be asked to return on a different day to have your blood pressure checked again.  You may be asked to monitor your blood pressure at home for 1 week or longer.    If you are diagnosed with hypertension, you may have other blood or imaging tests to help your health care provider understand your overall risk for other conditions.    How is this treated?  This condition is treated by making healthy lifestyle changes, such as eating healthy foods, exercising more, and reducing your alcohol intake. Your health care provider may prescribe medicine if lifestyle changes are not enough to get your blood pressure under control, and if:    Your systolic blood pressure is above 130.  Your diastolic blood pressure is above 80.    Your personal target blood pressure may vary depending on your medical conditions, your age, and other factors.    Follow these instructions at home:  Eating and drinking     Eat a diet that is high in fiber and potassium, and low in sodium, added sugar, and fat. An example eating plan is called the DASH (Dietary Approaches to Stop Hypertension) diet. To eat this way:    Eat plenty of fresh fruits and vegetables. Try to fill half of your plate at each meal with fruits and vegetables.  Eat whole grains, such as whole wheat pasta, brown rice, or whole grain bread. Fill about one quarter of your plate with whole grains.  Eat or drink low-fat dairy products, such as skim milk or low-fat yogurt.  Avoid fatty cuts of meat, processed or cured meats, and poultry with skin. Fill about one quarter of your plate with lean proteins, such as fish, chicken without skin, beans, eggs, and tofu.  Avoid premade and processed foods. These tend to be higher in sodium, added sugar, and fat.    Reduce your daily sodium intake. Most people with hypertension should eat less than 1,500 mg of sodium a day.  ImageLimit alcohol intake to no more than 1 drink a day for nonpregnant women and 2 drinks a day for men. One drink equals 12 oz of beer, 5 oz of wine, or 1½ oz of hard liquor.  Lifestyle     Work with your health care provider to maintain a healthy body weight or to lose weight. Ask what an ideal weight is for you.  Get at least 30 minutes of exercise that causes your heart to beat faster (aerobic exercise) most days of the week. Activities may include walking, swimming, or biking.  Include exercise to strengthen your muscles (resistance exercise), such as pilates or lifting weights, as part of your weekly exercise routine. Try to do these types of exercises for 30 minutes at least 3 days a week.  Do not use any products that contain nicotine or tobacco, such as cigarettes and e-cigarettes. If you need help quitting, ask your health care provider.  Monitor your blood pressure at home as told by your health care provider.  Keep all follow-up visits as told by your health care provider. This is important.  Medicines     Take over-the-counter and prescription medicines only as told by your health care provider. Follow directions carefully. Blood pressure medicines must be taken as prescribed.  Do not skip doses of blood pressure medicine. Doing this puts you at risk for problems and can make the medicine less effective.  Ask your health care provider about side effects or reactions to medicines that you should watch for.  Contact a health care provider if:  You think you are having a reaction to a medicine you are taking.  You have headaches that keep coming back (recurring).  You feel dizzy.  You have swelling in your ankles.  You have trouble with your vision.  Get help right away if:  You develop a severe headache or confusion.  You have unusual weakness or numbness.  You feel faint.  You have severe pain in your chest or abdomen.  You vomit repeatedly.  You have trouble breathing.  Summary  Hypertension is when the force of blood pumping through your arteries is too strong. If this condition is not controlled, it may put you at risk for serious complications.  Your personal target blood pressure may vary depending on your medical conditions, your age, and other factors. For most people, a normal blood pressure is less than 120/80.  Hypertension is treated with lifestyle changes, medicines, or a combination of both. Lifestyle changes include weight loss, eating a healthy, low-sodium diet, exercising more, and limiting alcohol.  This information is not intended to replace advice given to you by your health care provider. Make sure you discuss any questions you have with your health care provider.

## 2022-02-15 NOTE — ED ADULT NURSE NOTE - NSIMPLEMENTINTERV_GEN_ALL_ED
Implemented All Universal Safety Interventions:  [place] to call system. Call bell, personal items and telephone within reach. Instruct patient to call for assistance. Room bathroom lighting operational. Non-slip footwear when patient is off stretcher. Physically safe environment: no spills, clutter or unnecessary equipment. Stretcher in lowest position, wheels locked, appropriate side rails in place.

## 2022-02-15 NOTE — ED ADULT NURSE NOTE - OBJECTIVE STATEMENT
midsternal chest pain with elevated BP. PT states he was on a water pill but stopped and now has symptoms

## 2022-02-15 NOTE — ED PROVIDER NOTE - ATTENDING CONTRIBUTION TO CARE
55 yo  m c/o chest pain. describes it as a sharp pinch to his left pectoral region. no pressure or tightness. going on for months. sts smokes and hasn't taken his bp meds in monhs. has apt with his pmd coming up. denies sob, fever, chills, leg pain or swelling.    pt has a neg stress test Feb 2021.     pt in nad, ent nml neck sup, ctab, rrr, ab soft, nt, nd. nml pulses = b/l. nfd.     ekg, labs, imaging, supportive care

## 2022-02-15 NOTE — ED PROVIDER NOTE - OBJECTIVE STATEMENT
56 y.o. male pmh of HTN, noncompliant with antihypertensives here for several days of lower left pectoral pinch like discomfort. no pleurisy no calf swelling, no cough sob fever or chills. Had negative nuc stress and CCTA in feb 2021. No aggravating or alleviating factors

## 2022-02-15 NOTE — ED PROVIDER NOTE - PHYSICAL EXAMINATION
Physical Exam    Vital Signs: I have reviewed the initial vital signs.  Constitutional: well-nourished, appears stated age, no acute distress  Eyes: Conjunctiva pink, Sclera clear, PERRLA  Cardiovascular: S1 and S2, regular rate, regular rhythm, well-perfused extremities, radial pulses equal and 2+, calves nonttp, equal in size  Respiratory: unlabored respiratory effort, speaking in full sentences, handling oral secretions, clear to auscultation bilaterally no wheezing, rales and rhonchi  Gastrointestinal: soft, non-tender abdomen, no pulsatile mass, normal bowl sounds  Musculoskeletal: supple neck, no lower extremity edema, no midline tenderness, paraspinal tenderness, clavicular creptius, painful rom, moving all appropriately, no gross bony deformities or swelling.  Integumentary: warm, dry, no rashes, lacerations,  Neurologic: awake, alert

## 2022-02-16 VITALS
SYSTOLIC BLOOD PRESSURE: 204 MMHG | OXYGEN SATURATION: 97 % | TEMPERATURE: 98 F | DIASTOLIC BLOOD PRESSURE: 100 MMHG | RESPIRATION RATE: 18 BRPM | HEART RATE: 87 BPM

## 2022-02-16 PROCEDURE — 71045 X-RAY EXAM CHEST 1 VIEW: CPT | Mod: 26

## 2022-02-16 RX ORDER — POTASSIUM CHLORIDE 20 MEQ
40 PACKET (EA) ORAL ONCE
Refills: 0 | Status: COMPLETED | OUTPATIENT
Start: 2022-02-16 | End: 2022-02-16

## 2022-02-16 RX ORDER — AMLODIPINE BESYLATE 2.5 MG/1
1 TABLET ORAL
Qty: 14 | Refills: 0
Start: 2022-02-16 | End: 2022-03-01

## 2022-02-16 RX ADMIN — Medication 40 MILLIEQUIVALENT(S): at 00:29

## 2022-02-17 ENCOUNTER — INPATIENT (INPATIENT)
Facility: HOSPITAL | Age: 57
LOS: 2 days | Discharge: HOME | End: 2022-02-20
Attending: INTERNAL MEDICINE | Admitting: INTERNAL MEDICINE
Payer: COMMERCIAL

## 2022-02-17 VITALS
RESPIRATION RATE: 18 BRPM | SYSTOLIC BLOOD PRESSURE: 225 MMHG | WEIGHT: 179.9 LBS | HEART RATE: 112 BPM | DIASTOLIC BLOOD PRESSURE: 107 MMHG | HEIGHT: 70 IN | OXYGEN SATURATION: 99 % | TEMPERATURE: 97 F

## 2022-02-17 LAB
ALBUMIN SERPL ELPH-MCNC: 4.1 G/DL — SIGNIFICANT CHANGE UP (ref 3.5–5.2)
ALP SERPL-CCNC: 91 U/L — SIGNIFICANT CHANGE UP (ref 30–115)
ALT FLD-CCNC: 102 U/L — HIGH (ref 0–41)
ANION GAP SERPL CALC-SCNC: 14 MMOL/L — SIGNIFICANT CHANGE UP (ref 7–14)
AST SERPL-CCNC: 155 U/L — HIGH (ref 0–41)
BASOPHILS # BLD AUTO: 0.04 K/UL — SIGNIFICANT CHANGE UP (ref 0–0.2)
BASOPHILS NFR BLD AUTO: 0.6 % — SIGNIFICANT CHANGE UP (ref 0–1)
BILIRUB SERPL-MCNC: 1.1 MG/DL — SIGNIFICANT CHANGE UP (ref 0.2–1.2)
BUN SERPL-MCNC: 8 MG/DL — LOW (ref 10–20)
CALCIUM SERPL-MCNC: 8.8 MG/DL — SIGNIFICANT CHANGE UP (ref 8.5–10.1)
CHLORIDE SERPL-SCNC: 97 MMOL/L — LOW (ref 98–110)
CO2 SERPL-SCNC: 23 MMOL/L — SIGNIFICANT CHANGE UP (ref 17–32)
CREAT SERPL-MCNC: 1 MG/DL — SIGNIFICANT CHANGE UP (ref 0.7–1.5)
D DIMER BLD IA.RAPID-MCNC: 121 NG/ML DDU — SIGNIFICANT CHANGE UP (ref 0–230)
EOSINOPHIL # BLD AUTO: 0.09 K/UL — SIGNIFICANT CHANGE UP (ref 0–0.7)
EOSINOPHIL NFR BLD AUTO: 1.3 % — SIGNIFICANT CHANGE UP (ref 0–8)
GLUCOSE SERPL-MCNC: 100 MG/DL — HIGH (ref 70–99)
HCT VFR BLD CALC: 54.8 % — HIGH (ref 42–52)
HGB BLD-MCNC: 19.5 G/DL — CRITICAL HIGH (ref 14–18)
IMM GRANULOCYTES NFR BLD AUTO: 0.6 % — HIGH (ref 0.1–0.3)
LYMPHOCYTES # BLD AUTO: 1.09 K/UL — LOW (ref 1.2–3.4)
LYMPHOCYTES # BLD AUTO: 15.8 % — LOW (ref 20.5–51.1)
MAGNESIUM SERPL-MCNC: 2 MG/DL — SIGNIFICANT CHANGE UP (ref 1.8–2.4)
MCHC RBC-ENTMCNC: 34.2 PG — HIGH (ref 27–31)
MCHC RBC-ENTMCNC: 35.6 G/DL — SIGNIFICANT CHANGE UP (ref 32–37)
MCV RBC AUTO: 96.1 FL — HIGH (ref 80–94)
MONOCYTES # BLD AUTO: 0.7 K/UL — HIGH (ref 0.1–0.6)
MONOCYTES NFR BLD AUTO: 10.2 % — HIGH (ref 1.7–9.3)
NEUTROPHILS # BLD AUTO: 4.92 K/UL — SIGNIFICANT CHANGE UP (ref 1.4–6.5)
NEUTROPHILS NFR BLD AUTO: 71.5 % — SIGNIFICANT CHANGE UP (ref 42.2–75.2)
NRBC # BLD: 0 /100 WBCS — SIGNIFICANT CHANGE UP (ref 0–0)
NT-PROBNP SERPL-SCNC: 259 PG/ML — SIGNIFICANT CHANGE UP (ref 0–300)
PLATELET # BLD AUTO: 28 K/UL — LOW (ref 130–400)
POTASSIUM SERPL-MCNC: 4.5 MMOL/L — SIGNIFICANT CHANGE UP (ref 3.5–5)
POTASSIUM SERPL-SCNC: 4.5 MMOL/L — SIGNIFICANT CHANGE UP (ref 3.5–5)
PROT SERPL-MCNC: 7.7 G/DL — SIGNIFICANT CHANGE UP (ref 6–8)
RBC # BLD: 5.7 M/UL — SIGNIFICANT CHANGE UP (ref 4.7–6.1)
RBC # FLD: 13.1 % — SIGNIFICANT CHANGE UP (ref 11.5–14.5)
SARS-COV-2 RNA SPEC QL NAA+PROBE: SIGNIFICANT CHANGE UP
SODIUM SERPL-SCNC: 134 MMOL/L — LOW (ref 135–146)
TROPONIN T SERPL-MCNC: <0.01 NG/ML — SIGNIFICANT CHANGE UP
WBC # BLD: 6.88 K/UL — SIGNIFICANT CHANGE UP (ref 4.8–10.8)
WBC # FLD AUTO: 6.88 K/UL — SIGNIFICANT CHANGE UP (ref 4.8–10.8)

## 2022-02-17 PROCEDURE — 99406 BEHAV CHNG SMOKING 3-10 MIN: CPT

## 2022-02-17 PROCEDURE — 71046 X-RAY EXAM CHEST 2 VIEWS: CPT | Mod: 26

## 2022-02-17 PROCEDURE — 99223 1ST HOSP IP/OBS HIGH 75: CPT | Mod: 25

## 2022-02-17 PROCEDURE — 99222 1ST HOSP IP/OBS MODERATE 55: CPT

## 2022-02-17 PROCEDURE — 99408 AUDIT/DAST 15-30 MIN: CPT

## 2022-02-17 PROCEDURE — 93010 ELECTROCARDIOGRAM REPORT: CPT

## 2022-02-17 PROCEDURE — 99285 EMERGENCY DEPT VISIT HI MDM: CPT

## 2022-02-17 RX ORDER — LABETALOL HCL 100 MG
10 TABLET ORAL ONCE
Refills: 0 | Status: COMPLETED | OUTPATIENT
Start: 2022-02-17 | End: 2022-02-17

## 2022-02-17 RX ORDER — LABETALOL HCL 100 MG
10 TABLET ORAL ONCE
Refills: 0 | Status: DISCONTINUED | OUTPATIENT
Start: 2022-02-17 | End: 2022-02-17

## 2022-02-17 RX ADMIN — Medication 10 MILLIGRAM(S): at 21:08

## 2022-02-17 RX ADMIN — Medication 1.25 MILLIGRAM(S): at 22:30

## 2022-02-17 NOTE — ED ADULT TRIAGE NOTE - CHIEF COMPLAINT QUOTE
Patient complaining of chest discomfort and palpitations since Wednesday. Was recently here and was sent home on amlodipine which he has been taking.

## 2022-02-17 NOTE — CONSULT NOTE ADULT - ATTENDING COMMENTS
56M with untreated HTN, tobacco use, EtOH use presented with elevated BP and thumping sensation in chest.     - Increase amlodipine to 10 mg  - Begin lisinopril 10 mg daily  - Begin chlorthalidone 25 mg daily   - Check lipid panel, Hgb A1c, TSH  - Monitor on telemetry, may need holter monitor at discharge  - Check echo  - Has risk factors for CAD, can pursue outpatient stress test, not necessary at this time  - F/u with cardiologist 1-2 weeks after discharge  - Evaluation of thrombocytopenia and transaminitis   - Cardiology will sign off. Please call cardiology fellow if additional questions arise

## 2022-02-17 NOTE — ED ADULT NURSE REASSESSMENT NOTE - NS ED NURSE REASSESS COMMENT FT1
Pt received from previous RN, Pt hypertensive on cardiac monitor has no c.o pain at this time. Will continue to monitor and assess.

## 2022-02-17 NOTE — ED ADULT TRIAGE NOTE - CCCP TRG CHIEF CMPLNT
*NOTICE TO RECEIVING PARTY AGENCY**  This information is strictly Confidential and protected under 
[place] law.  [place] law prohibits you from making any further disclosure of this 
information unless further disclosure is expressly permitted by the written consent of the person to 
whom it pertains or is authorized by law.  A general authorization for the release of medical or 
other information is not sufficient for this purpose.  Hospital accepts no responsibility if the 
information is made available to any other person, INCLUDING THE PATIENT.



Interpretation Summary

  *  Name: SULEMAN VEE  Study Date: 2018 10:25 AM  BP: 148/67 mmHg

  *  MRN: [id number]  Patient Location: .2T\S\E219\S\1  HR: 49

  *  : 1954 (M/d/yyyy)  Gender: Male  Height: 72 in

  *  Age: 64 yrs  Ethnicity: [place]  Weight: 191 lb

  *  Ordering Physician: Suleman Cedeño

  *  Performed By: Nikki Graham RDCS

  *  Accession# [id number]  Account# [id number]

  *  Reason For Study: SYNCOPE, NSVT, CAD

  *  BSA: 2.1 m2

  *  -- Conclusions --

  *  The left ventricle is borderline dilated.

  *  There is mild asymmetric left ventricular hypertrophy.

  *  Left ventricular systolic function is normal.

  *  The left atrium is mildly dilated.

  *  Normal exercise echocardiogram without evidence of inducible ischemia

  *  Hypertensive response to exercise

Procedure Details

  *  ECHOEX, CPT #17471

  *  ECHO DOPPLER, CPT #70933

  *  ECHO COLOR FLOW, CPT #[id number]

  *  A contrast injection of Definity was performed to improve assessment of LV function.

  *  Contrast was injected into an intravenous site in the right arm.

  *  One vial of Definity ultrasound contrast was diluted in normal saline to a total volume of 10 
ml.  A total of '3' ml of solution was administered during imaging.

  *  Lot # [id number] of Definity utilized for procedure.

  *  Expiration date .

  *  The attending nurse who injected the contrast agent was NIKI ZAPATA RN.

Left Ventricular Findings with Stress

  *  Normal exercise echocardiogram without evidence of inducible ischemia Hypertensive response to 
exercise

Left Ventricle

  *  The left ventricle is borderline dilated.

  *  There is mild asymmetric left ventricular hypertrophy.

  *  Ejection Fraction = 65-70%.

  *  Left ventricular systolic function is normal.

  *  The left ventricular wall motion is normal at rest.

Right Ventricle

  *  The right ventricle is normal in size and function.

Atria

  *  The left atrium is mildly dilated.

  *  Right atrial size is normal.

Mitral Valve

  *  The mitral valve is grossly normal.

  *  Significant mitral regurgitation is absent.

Tricuspid Valve

  *  The tricuspid valve anatomy is normal.

  *  Significant tricuspid regurgitation is absent.

Aortic Valve

  *  The aortic valve is normal in structure and function.

  *  The aortic valve is trileaflet.

  *  No hemodynamically significant valvular aortic stenosis.

  *  There is no significant aortic regurgitation.

Pericardium

  *  There is no pericardial effusion.

Stress Parameters

  *  Normal baseline electrocardiogram.

  *  Stress ECG: No ST changes.  No arrhythmias.

  *  Rest heart rate was '49' BPM.

  *  Rest blood pressure was '148/67'

  *  Maximum heart rate achieved was 126 bpm.

  *  Maximum heart rate was 81 % of maximum age-predicted heart rate.

  *  Maximum blood pressure was '180/47'

  *  Total exercise time was '7:51'

  *  Maximum exercise MET level achieved was '9.80' METS

  *  Maximum treadmill speed was '3.40' miles per hour.

  *  Maximum treadmill elevation was '14.00'% grade.

  *  Exercise was terminated due to 'fatigue'

  *  Target Heart Rate was not achieved due to fatigue.

  *  The patient exhibited dyspnea during exercise.

  *  Exercise was stopped due to fatigue.

Left Ventricular Findings with Stress

  *  Baseline EKG was normal There are no significant ST or T-wave changes during exercise or 
recovery Baseline echocardiogram was normal with normal wall motion There was normal augmentation of 
all segments without inducible wall motion abnormalities at peak exertion Duke treadmill score:  8 
(low risk) There was a hypertensive response to exercise



MMode 2D Measurements and Calculations

IVSd 1.5 cm

IVSs 2.1 cm



LVIDd 5.4 cm

LVIDs 3.3 cm

LVPWd 1.1 cm

LVPWs 1.7 cm



IVS/LVPW 1.4 

FS 39.6 %

EDV(Teich) 142.5 ml

ESV(Teich) 43.4 ml

EF(Teich) 69.6 %



EDV(cubed) 159.2 ml

ESV(cubed) 35.2 ml

EF(cubed) 77.9 %

% IVS thick 38.8 %

% LVPW thick 57.5 %





LV mass(C)d 294.1 grams

LV mass(C)dI 140.8 grams/m\S\2

LV mass(C)s 263.6 grams

LV mass(C)sI 126.2 grams/m\S\2



SV(Teich) 99.1 ml

SI(Teich) 47.5 ml/m\S\2

SV(cubed) 124.0 ml

SI(cubed) 59.4 ml/m\S\2



ACS 1.1 cm

LA dimension 5.1 cm



asc Aorta Diam 3.4 cm





LVOT diam 1.9 cm

LVOT area 2.9 cm\S\2



LVAd ap4 35.8 cm\S\2

LVLd ap4 8.3 cm

EDV(MOD-sp4) 125.6 ml

EDV(sp4-el) 131.1 ml

LVAs ap4 18.1 cm\S\2

LVLs ap4 6.6 cm

ESV(MOD-sp4) 40.9 ml

ESV(sp4-el) 42.2 ml

EF(MOD-sp4) 67.4 %

EF(sp4-el) 67.8 %



LVAd ap2 35.7 cm\S\2

LVLd ap2 8.8 cm

EDV(MOD-sp2) 123.2 ml

EDV(sp2-el) 122.4 ml

LVAs ap2 18.5 cm\S\2

LVLs ap2 7.6 cm

ESV(MOD-sp2) 37.5 ml

ESV(sp2-el) 38.4 ml

EF(MOD-sp2) 69.6 %

EF(sp2-el) 68.6 %



LVLd %diff 6.0 %

EDV(MOD-bp) 124.3 ml

LVLs %diff 12.9 %

ESV(MOD-bp) 41.4 ml

EF(MOD-bp) 66.7 %





SV(MOD-sp4) 84.7 ml

SI(MOD-sp4) 40.5 ml/m\S\2



SV(MOD-sp2) 85.7 ml

SI(MOD-sp2) 41.0 ml/m\S\2



SV(MOD-bp) 82.9 ml

SI(MOD-bp) 39.7 ml/m\S\2



SV(sp4-el) 88.9 ml

SI(sp4-el) 42.6 ml/m\S\2





SV(sp2-el) 84.0 ml

SI(sp2-el) 40.2 ml/m\S\2













Doppler Measurements and Calculations

MV E max artie 100.3 cm/sec

MV A max artie 97.6 cm/sec



MV E/A 1.0 



MV dec time 0.21 sec



Ao V2 max 188.9 cm/sec

Ao max PG 14.3 mmHg

Ao max PG (full) 3.4 mmHg

MELONIE(V,A) 2.6 cm\S\2

MELONIE(V,D) 2.6 cm\S\2





LV V1 max PG 10.8 mmHg



LV V1 max 164.6 cm/sec



MR max artie 365.8 cm/sec

MR max PG 53.5 mmHg



PA V2 max 73.3 cm/sec

PA max PG 2.2 mmHg chest discomfort

## 2022-02-17 NOTE — CONSULT NOTE ADULT - ASSESSMENT
IMPRESSION  ·	Chest 'thumping' sensation - r/o underlying arrythmias  ·	Chronically uncontrolled HTN (in past several years, multiple notes reflecting poorly controlled BP) - need to optimize bp control / encourage dietary changes and medication compliance.   ·	Other acute issues:    ·	Headache / blurry vision / facial paresthesias  ·	Polycythemia / thrombocytopenia  ·	transaminitis  ----  2/'21:  stress:   negative adenosin MPI.    2/'21:  tte:   ef 64.  g2dd.  moderate lvh.  mild ai.   dilated ascending aorta 4.2cm.    2/'21:  cac:  706  (only calcium score ordered.   lad 519, lcx 53, rca 134).          PLAN  ·	telemetry monitoring to r/o underlying arrythmias  ·	advocate lifestyle changes: dash diet.  weight loss. reduce etoh. smoking cessation.   ·	for chronically uncontrolled htn:  suggest to increase amlodipine to 10mg,  add lisinopril 10mg daily, add hctz 25mg daily  ·	rec stat CT H given initial (though now resolved) headaches/vision changes in setting of HTN with sbp 220s. ** communicated this to ED resident **  ·	primary team to further investigate the polycythemia / thrombocytopenia*** / transaminitis  ·	pt currently denying any chest 'pains' and described his cardiac symptom as primarily chest 'thumping' sensations c/w palpitations.  however,  if pt should have recurrence of chest pains -- further ischemia w/u should be pursued after w/u done for his thrombocytopenia,  whether pt can tolerate dapt in future if it were to be indicated.      IMPRESSION  ·	Chest 'thumping' sensation - r/o underlying arrythmias  ·	Chronically uncontrolled HTN (in past several years, multiple notes reflecting poorly controlled BP) - need to optimize bp control / encourage dietary changes and medication compliance.   ·	Other acute issues:    ·	Headache / blurry vision / facial paresthesias  ·	Polycythemia / thrombocytopenia  ·	transaminitis  ----  2/'21:  stress:   negative adenosin MPI.    2/'21:  tte:   ef 64.  g2dd.  moderate lvh.  mild ai.   dilated ascending aorta 4.2cm.    2/'21:  cac:  706  (only calcium score ordered.   lad 519, lcx 53, rca 134).          PLAN  ·	rec stat CT H given initial (though now resolved) headaches/vision changes in setting of HTN with sbp 220s. ** communicated this to ED resident **  >> if any acute findings to suggest htn emergency with neurological defects pt would need his bp to be lowered more urgently / need monitoring in micu setting.    ·	continuous telemetry monitoring to r/o underlying arrhythmias  ·	otherwise for chronically uncontrolled htn:  suggest to increase amlodipine to 10mg,  add lisinopril 10mg daily, add hctz 25mg daily  ·	advocate lifestyle changes: dash diet.  weight loss. reduce etoh. smoking cessation.   ·	primary team to further investigate the polycythemia / thrombocytopenia*** / transaminitis  ·	pt currently denying any chest 'pains' and described his cardiac symptom as primarily chest 'thumping' sensations c/w palpitations.  however,  if pt should have recurrence of chest pains -- further ischemia w/u should be pursued after w/u done for his thrombocytopenia,  whether pt can tolerate dapt in future if it were to be indicated.      IMPRESSION  ·	Chest 'thumping' sensation - r/o underlying arrythmias  ·	Chronically uncontrolled HTN (in past several years, multiple notes reflecting poorly controlled BP) - need to optimize bp control / encourage dietary changes and medication compliance.   ·	Other acute issues:    ·	Headache / blurry vision / facial paresthesias  ·	Polycythemia / thrombocytopenia  ·	transaminitis  ----  2/'21:  stress:   negative adenosin MPI.    2/'21:  tte:   ef 64.  g2dd.  moderate lvh.  mild ai.   dilated ascending aorta 4.2cm.    2/'21:  cac:  706  (only calcium score ordered.   lad 519, lcx 53, rca 134).          PLAN  ·	rec stat CT H given initial (though now resolved) headaches/vision changes in setting of HTN with sbp 220s. ** communicated this to ED resident **  >> if any acute findings to suggest htn emergency with neurological defects pt would need his bp to be lowered more urgently / need monitoring in micu setting.    ·	continuous telemetry monitoring to r/o underlying arrhythmias  ·	otherwise for chronically uncontrolled htn:  suggest to increase amlodipine to 10mg,  add lisinopril 10mg daily, add hctz 25mg daily  ·	advocate lifestyle changes: dash diet.  weight loss. reduce etoh. smoking cessation.   ·	primary team to further investigate the polycythemia / thrombocytopenia*** / transaminitis  ·	pt currently denying any chest 'pains' and described his cardiac symptom as primarily chest 'thumping' sensations c/w palpitations.  however,  if pt should have recurrence of chest pains -- further ischemia w/u should be pursued after w/u done for his thrombocytopenia,  whether pt can tolerate dapt in future if it were to be indicated.   ·	check tte,  tsh/t4     IMPRESSION  ·	Chest 'thumping' sensation - r/o underlying arrythmias  ·	Chronically uncontrolled HTN (in past several years, multiple notes reflecting poorly controlled BP) - need to optimize bp control / encourage dietary changes and medication compliance.   ·	Other acute issues:    ·	Headache / blurry vision / facial paresthesias  ·	Polycythemia / thrombocytopenia  ·	transaminitis  ----  2/'21:  stress:   negative adenosin MPI.    2/'21:  tte:   ef 64.  g2dd.  moderate lvh.  mild ai.   dilated ascending aorta 4.2cm.    2/'21:  cac:  706  (only calcium score ordered.   lad 519, lcx 53, rca 134).          PLAN  ·	rec stat CT H given initial (though now resolved) headaches/vision changes in setting of HTN with sbp 220s. ** communicated this to ED resident **  >> if any acute findings to suggest htn emergency with neurological defects pt would need his bp to be lowered more urgently / need monitoring in micu setting.    ·	continuous telemetry monitoring to r/o underlying arrhythmias  ·	otherwise for chronically uncontrolled htn:  suggest to increase amlodipine to 10mg,  add lisinopril 10mg daily, add hctz 25mg daily (switch to chlorthalidone if not enough bp control with hctz)  ·	advocate lifestyle changes: dash diet.  weight loss. reduce etoh. smoking cessation.   ·	primary team to further investigate the polycythemia / thrombocytopenia*** / transaminitis  ·	pt currently denying any chest 'pains' and described his cardiac symptom as primarily chest 'thumping' sensations c/w palpitations.  however,  if pt should have recurrence of chest pains -- further ischemia w/u should be pursued after w/u done for his thrombocytopenia,  whether pt can tolerate dapt in future if it were to be indicated.   ·	check tte,  tsh/t4

## 2022-02-17 NOTE — H&P ADULT - NSHPPHYSICALEXAM_GEN_ALL_CORE
VITALS:   Vital Signs Last 24 Hrs  T(C): 36 (17 Feb 2022 16:13), Max: 36 (17 Feb 2022 16:13)  T(F): 96.8 (17 Feb 2022 16:13), Max: 96.8 (17 Feb 2022 16:13)  HR: 69 (17 Feb 2022 22:22) (69 - 112)  BP: 183/76 (17 Feb 2022 22:33) (183/76 - 225/107)  BP(mean): --  RR: 18 (17 Feb 2022 20:55) (18 - 19)  SpO2: 98% (17 Feb 2022 20:55) (98% - 99%)  I&O's Summary    CAPILLARY BLOOD GLUCOSE          PHYSICAL EXAM:  General: WN/WD NAD  HEENT: PERRLA, EOMI, moist mucous membranes  Neurology: A&Ox3, nonfocal, VANEGAS x 4  Respiratory: CTA B/L, normal respiratory effort, no wheezes, crackles, rales  CV: RRR, S1S2, no murmurs, rubs or gallops  Abdominal: Soft, NT, ND +BS, Last BM  Extremities: No edema, + peripheral pulses  Incisions:   Tubes: VITALS:   Vital Signs Last 24 Hrs  T(C): 36 (17 Feb 2022 16:13), Max: 36 (17 Feb 2022 16:13)  T(F): 96.8 (17 Feb 2022 16:13), Max: 96.8 (17 Feb 2022 16:13)  HR: 69 (17 Feb 2022 22:22) (69 - 112)  BP: 183/76 (17 Feb 2022 22:33) (183/76 - 225/107)  BP(mean): --  RR: 18 (17 Feb 2022 20:55) (18 - 19)  SpO2: 98% (17 Feb 2022 20:55) (98% - 99%)  I&O's Summary    CAPILLARY BLOOD GLUCOSE          PHYSICAL EXAM:  General: WN/WD NAD  Neurology: A&Ox3, nonfocal, VANEGAS x 4  Respiratory: CTA B/L, normal respiratory effort, no wheezes, crackles, rales  CV: RRR, S1S2, no murmurs, rubs or gallops  Abdominal: Soft, NT, ND +BS, Last BM  Extremities: No edema, + peripheral pulses  Incisions:   Tubes:

## 2022-02-17 NOTE — ED ADULT NURSE NOTE - OBJECTIVE STATEMENT
patient was seen here on wednesday for chest pain and sent home on amlodipine. Patient states c.p has worsened and bp remains high.

## 2022-02-17 NOTE — H&P ADULT - ASSESSMENT
57 yo male w/ PMH of ETOH abuse, smoker, uncontrolled HTN recently placed on amlodipine presents for chest pain and palpitations x 4 days.  Was here 2 days ago for same complaint, was DCed with amlodipine RX, still having pain and palpitations so returned.  Left sided pinching pain, radiates to retrosternal area and occasionally L arm, no alleviating/provoking factors, denies having had before.  Denies SOB, leg pain, leg swelling, hx of blood clots, hemoptysis, recent travel/surgery/immobilization, URI sxs.  Brother had "small MI" in 40s. Has not seen a cardiologist. 55 yo male w/ PMH of ETOH abuse, smoker, uncontrolled HTN recently placed on amlodipine presents for chest pain and palpitations x 4 days.  Was here 2 days ago for same complaint, was DCed with amlodipine RX, still having pain and palpitations so returned.  Left sided pinching pain, radiates to retrosternal area and occasionally L arm, no alleviating/provoking factors, denies having had before.  Denies SOB, leg pain, leg swelling, hx of blood clots, hemoptysis, recent travel/surgery/immobilization, URI sxs.  Brother had "small MI" in 40s. Has not seen a cardiologist.    # Hypertensive urgency from medication sub-optimization.   # chest pain - resolved   - trops (-) x2, CXR (-)   - Increase home amlodipine to 10mg in the AM + ace i in the PM  - cardio consulted  - f/u CTH    # Reactive Polycythemia with chronic thromcocytopenia   - hgb baseline 18.5-19.5, platelets 28 (confirmed on manual count)  - denies any bleeding causes, pending peripheral smear  - f/u coags, if WNL, platelet function test  - f/u heme o/p     #EtOH and smoking  # suspected thiamine def  -Etoh & tobacco cessation counseling provided  - Huber protocal   - start thiamin/folate    # Transaminitis   - likely related to recent binge drinking  - trend LFTs, hep panel    # DVT ppx- LMWH  # Activity- AAT  # Diet- Dash/tlc  # Code status - full  # Dispo- from home  57 yo male w/ PMH of ETOH abuse, smoker, uncontrolled HTN recently placed on amlodipine presents for chest pain and palpitations x 4 days.  Was here 2 days ago for same complaint, was DCed with amlodipine RX, still having pain and palpitations so returned.  Left sided pinching pain, radiates to retrosternal area and occasionally L arm, no alleviating/provoking factors, denies having had before.  Denies SOB, leg pain, leg swelling, hx of blood clots, hemoptysis, recent travel/surgery/immobilization, URI sxs.  Brother had "small MI" in 40s. Has not seen a cardiologist.    # Hypertensive urgency from medication sub-optimization.   # chest pain - resolved   - trops (-) x2, CXR (-)   - Increase home amlodipine to 10mg in the AM + ace i in the PM  - cardio consulted  - f/u CTH    # Reactive Polycythemia with chronic thrombocytopenia   - hgb baseline 18.5-19.5, platelets 28 (confirmed on manual count)  - denies any bleeding causes, pending peripheral smear  - f/u coags, if WNL, platelet function test  - f/u heme o/p     #EtOH and smoking  # suspected thiamine def  -Etoh & tobacco cessation counseling provided  - Huber protocal   - start thiamin/folate    # Transaminitis   - likely related to recent binge drinking  - trend LFTs, hep panel    # Suspected EMILIANO  - o/p pulm sleep test    # DVT ppx- LMWH  # Activity- AAT  # Diet- Dash/tlc  # Code status - full  # Dispo- from home

## 2022-02-17 NOTE — ED PROVIDER NOTE - ATTENDING CONTRIBUTION TO CARE
55 yo M PMH poorly controlled HTN (x many years,  intermittently on medications at home),  DL (not on any medications at home),  active smoker,  active etoh use (several mixed drinks/rum per week and weekends),  with FH of CAD in brother (age 45) and cancer (bone?) in mother --presents with poorly controlled BP,  several thumping sensations in chest,  and migrating headache / paresthesias in face / L arm / blurry vision.  Pt described his chest 'thumping' as 'not really pain' but occasional very hard heartbeat every now and then,  and occasionally several hard/fast heartbeats together.   States he usually can go up flight of stairs / walk up hill without any exertional chest pain -- but can become SOB if he runs ~1 block.  PE;  agree with above.  A/P:  HTN, CP.  EKG, Labs and Admit Tele

## 2022-02-17 NOTE — H&P ADULT - ATTENDING COMMENTS
55 YO M with a PMH of ETOH abuse, smoker, and uncontrolled HTN who presents to the hospital with a c/o intermittent palpitations for the past x 4 days. Described as "heart pounding." Always occurs in the afternoon. Denies any SOB, LE swelling, fevers/chills, ABD pain, fevers/chills, or N/V/D. ROs is negative except as above.     In the ED, BP (225/107) given Vasotec and Labetalol. Cardio consulted and asked for CTH and BP management.     FMHx: Reviewed, not relevant    Physical exam shows pt in NAD. BP (157/80), afebrile, not hypoxic on RA. A&Ox3. Neuro exam without deficits, motor/sensory intact, no dysarthria, no facial asymmetry. Muscle strength/sensation intact. CTA B/L with no W/C/R. RRR, no M/G/R. ABD is soft and non-tender, normoactive BSs. LEs without swelling. No rashes. Labs and radiology as above.     Palpitations from Hypertensive urgency from medication sub-optimization. Restart home meds. Monitor VSs. Add on an ACE-I in the PM. TFTs.   -Etoh & tobacco cessation counseling provided    EtOH abuse + EtOH cessation counseling. CIWA protocol. Valium PRN. Trend BMPs, replace electrolytes PRN. Start on Multivitamin/Folate/Thiamine. Monitor VSs. Extensive counseling lasting between 15-30 minutes was held on the benefits of EtOH cessation.    Folate and thiamine deficiency due to EtOH abuse. Start on Folate/Thiamine supplementation prior to glucose admin. IVFs (LR).     Tobacco abuse with counseling. Pt extensively counseled on the benefits of smoking cessation and alternative therapies. Counseled for 15-20 minutes. Pt would like to think about their options prior to making a decision.     Restart home meds, except as stated above. DVT PPX. Inform PCP of pt's admission to hospital. My note supersedes the residents note.     Date seen by [email or web address] 57 YO M with a PMH of ETOH abuse, smoker, and uncontrolled HTN who presents to the hospital with a c/o intermittent palpitations for the past x 4 days. Described as "heart pounding." Always occurs in the afternoon. Denies any SOB, LE swelling, fevers/chills, ABD pain, fevers/chills, or N/V/D. ROs is negative except as above.     In the ED, BP (225/107) given Vasotec and Labetalol. Cardio consulted and asked for CTH and BP management.     FMHx: Reviewed, not relevant    Physical exam shows pt in NAD. BP (157/80), afebrile, not hypoxic on RA. A&Ox3. Neuro exam without deficits, motor/sensory intact, no dysarthria, no facial asymmetry. Muscle strength/sensation intact. CTA B/L with no W/C/R. RRR, no M/G/R. ABD is soft and non-tender, normoactive BSs. LEs without swelling. No rashes. Labs and radiology as above.     Palpitations from Hypertensive urgency from medication sub-optimization. Restart home meds. Monitor VSs. Add on an ACE-I in the PM. TFTs.   -Etoh & tobacco cessation counseling provided    Thrombocytopenia, chronic. Pt denies any bleeding symptoms. Peripheral smear. Send hepatitis panel and TFTs. Folate/B12. EtOH cessation counseling. If CBC and Coags are normal, can send for platelet function testing. Hematolgy out-pt FU.     Transaminitis, suspect from EtOH abuse. Send hepatitis panel. Repeat LFTs in the AM.     EtOH abuse + EtOH cessation counseling. CIWA protocol. Valium PRN. Trend BMPs, replace electrolytes PRN. Start on Multivitamin/Folate/Thiamine. Monitor VSs. Extensive counseling lasting between 15-30 minutes was held on the benefits of EtOH cessation.    Folate and thiamine deficiency due to EtOH abuse. Start on Folate/Thiamine supplementation prior to glucose admin. IVFs (LR).     Tobacco abuse with counseling. Pt extensively counseled on the benefits of smoking cessation and alternative therapies. Counseled for 15-20 minutes. Pt would like to think about their options prior to making a decision.     Restart home meds, except as stated above. DVT PPX. Inform PCP of pt's admission to hospital. My note supersedes the residents note.     Date seen by [email or web address]

## 2022-02-17 NOTE — H&P ADULT - NSHPLABSRESULTS_GEN_ALL_CORE
.  LABS:                         19.5   6.88  )-----------( 28       ( 17 Feb 2022 18:11 )             54.8     02-17    134<L>  |  97<L>  |  8<L>  ----------------------------<  100<H>  4.5   |  23  |  1.0    Ca    8.8      17 Feb 2022 18:11  Mg     2.0     02-17    TPro  7.7  /  Alb  4.1  /  TBili  1.1  /  DBili  x   /  AST  155<H>  /  ALT  102<H>  /  AlkPhos  91  02-17        Serum Pro-Brain Natriuretic Peptide: 259 pg/mL (02-17 @ 18:11)        RADIOLOGY, EKG & ADDITIONAL TESTS: Reviewed.

## 2022-02-17 NOTE — ED PROVIDER NOTE - OBJECTIVE STATEMENT
55 yo male w/ PMH of ETOH abuse, smoker, uncontrolled HTN recently placed on amlodipine presents for chest pain and palpitations x 4 days.  Was here 2 days ago for same complaint, was DCed with amlodipine RX, still having pain and palpitations so returned.  Left sided pinching pain, radiates to retrosternal area and occasionally L arm, no alleviating/provoking factors, denies having had before.  Denies SOB, leg pain, leg swelling, hx of blood clots, hemoptysis, recent travel/surgery/immobilization, URI sxs.  Brother had "small MI" in 40s. Has not seen a cardiologist.

## 2022-02-17 NOTE — ED PROVIDER NOTE - PHYSICAL EXAMINATION
GENERAL: NAD   SKIN: warm, dry  HEAD: Normocephalic; atraumatic.  EYES: PERRLA, EOMI  CARD: S1, S2 normal; no murmurs, gallops, or rubs. Tachycardic rate  RESP: LCTAB; No wheezes, rales, rhonchi, or stridor.  ABD: soft, nontender, and nondistended  EXT: No LE TTP or edema bilaterally.  NEURO: Alert, oriented, grossly unremarkable  PSYCH: Cooperative, appropriate.

## 2022-02-17 NOTE — CONSULT NOTE ADULT - SUBJECTIVE AND OBJECTIVE BOX
Outpt cardiologist:  none    HPI:  57 yo M  PMH poorly controlled HTN (x many years,  intermittently on medications at home),  DL (not on any medications at home),  active smoker,  active etoh use (several mixed drinks/rum per week and weekends),  with FH of CAD in brother (age 45) and cancer (bone?) in mother --  presents with poorly controlled BP,  several thumping sensations in chest,  and migrating headache / paresthesias in face / L arm / blurry vision.      Since being in ED,  pt had been given 1 labetalol 10 iv push.  Some improvement of his SBP from 220 to 190s.    Pt states he's no longer having any active headache / blurry vision and has not had any thumping sensation in his chest.      Pt described his chest 'thumping' as 'not really pain' but occasional very hard heartbeat every now and then,  and occasionally several hard/fast heartbeats together.   States he usually can go up flight of stairs / walk up hill without any exertional chest pain -- but can become SOB if he runs ~1 block.       Recent cardiac w/u:    :  stress:   negative adenosin MPI.    :  tte:   ef 64.  g2dd.  moderate lvh.  mild ai.   dilated ascending aorta 4.2cm.    :  cac:  706  (only calcium score ordered.   lad 519, lcx 53, rca 134).      Initial labs in ED also showing:  polycythemia with hg 19 -  not been worked up in past.   unsure if primary or secondary.   he does say he snores loudly / doesn't wake up refreshed, which could indicate possible undiagnosed darion --> secondary polycythemia.    thrombocytopenia (34.2) - not worked up in past.   transamnitis - not worked up yet.     PAST MEDICAL & SURGICAL HISTORY  HTN (hypertension)        FAMILY HISTORY:  FAMILY HISTORY:  Family history of atherosclerosis (Sibling)        SOCIAL HISTORY:  Social History:      ALLERGIES:  No Known Allergies      MEDICATIONS:  labetalol Injectable 10 milliGRAM(s) IV Push once    PRN:      HOME MEDICATIONS:  Home Medications:      VITALS:   T(F): 96.8 ( @ 16:13), Max: 98 ( @ 02:36)  HR: 83 ( @ 21:29) (83 - 112)  BP: 193/103 ( @ 21:29) (190/96 - 225/107)  BP(mean): --  RR: 18 ( @ 20:55) (18 - 19)  SpO2: 98% ( @ 20:55) (97% - 99%)    I&O's Summary      REVIEW OF SYSTEMS:  CONSTITUTIONAL: No weakness, fevers or chills  HEENT:  ++ HEADACHE,  VISION CHANGES/BLURRY VISION,  FACIAL NUMBNESS/TINGLING.    RESPIRATORY: No cough, sob  CARDIOVASCULAR: See HPI + THUMPING SENSATION,  LIKELY PALPITATIONS. DENIED CHEST 'PAIN'.   GASTROINTESTINAL: No abdominal pain. No nausea, vomiting, diarrhea   GENITOURINARY: No dysuria, frequency or hematuria  NEUROLOGICAL: + BLURRY VISION / HEADACHE / FACIAL NUMBNESS/TINGLING >> NOW RESOLVED.   SKIN: No new rashes    PHYSICAL EXAM:  General: Not in distress.  Non-toxic appearing. appears tanned.   HEENT: EOMI  Cardio: regular, S1, S2, no murmur  Pulm: B/L BS.    Abdomen: Soft, non-tender, non-distended. Normoactive bowel sounds  Extremities: No edema b/l le  Neuro: A&O x3.  responding appropriately.      LABS:                        19.5   6.88  )-----------( 28       ( 2022 18:11 )             54.8         134<L>  |  97<L>  |  8<L>  ----------------------------<  100<H>  4.5   |  23  |  1.0    Ca    8.8      2022 18:11  Mg     2.0         TPro  7.7  /  Alb  4.1  /  TBili  1.1  /  DBili  x   /  AST  155<H>  /  ALT  102<H>  /  AlkPhos  91        Troponin T, Serum: <0.01 ng/mL (22 @ 18:11)    CARDIAC MARKERS ( 2022 18:11 )  x     / <0.01 ng/mL / x     / x     / x            Troponin trend:    Serum Pro-Brain Natriuretic Peptide: 259 pg/mL (22 @ 18:11)      COVID-19 PCR: NotDetec (2022 17:31)      RADIOLOGY:  -CXR:  -TTE:  < from: TTE Echo Complete w/o Contrast w/ Doppler (21 @ 10:08) >   1. LV Ejection Fraction by Cross's Method with a biplane EF of 64 %.   2. Normal global left ventricular systolic function.   3. Moderate concentric left ventricular hypertrophy.   4. Spectral Doppler shows pseudonormal pattern of left ventricular myocardial filling (Grade II diastolic dysfunction).   5. Mild aortic regurgitation.   6. Dilatation of the ascending aorta (4.2 cm).    < end of copied text >  -CCTA:  -STRESS TEST:< from: NM Nuclear Stress Pharmacologic Multiple (21 @ 16:31) >  1. NORMAL ADENOSINE / REST MYOCARDIAL PERFUSION SPECT TOMOGRAPHY, WITH NO EVIDENCE FOR ISCHEMIA DURING ADENOSINE INFUSION.  2. NORMAL RESTING LEFT VENTRICULAR WALL MOTION AND WALL THICKENING.  3. LEFT VENTRICULAR EJECTION FRACTION OF  72 % WHICH IS WITHIN RANGE OF NORMAL    < end of copied text >    -CATHETERIZATION:  -OTHER:  EC Lead ECG:   Ventricular Rate 110 BPM    Atrial Rate 110 BPM    P-R Interval 154 ms    QRS Duration 84 ms    Q-T Interval 352 ms    QTC Calculation(Bazett) 476 ms    P Axis 66 degrees    R Axis 260 degrees    T Axis 66 degrees    Diagnosis Line Sinus tachycardia  Right atrial enlargement  Right superior axis deviation  Inferior infarct , age undetermined  Cannot rule out Anterior infarct , age undetermined  Abnormal ECG    Confirmed by NAIDA LANE MD ([id number]) on 2022 5:14:41 PM ( @ 16:15)      TELEMETRY EVENTS:

## 2022-02-17 NOTE — ED PROVIDER NOTE - PROGRESS NOTE DETAILS
Discussed with Cardio Fellow. BP from presentation to now is 225/104 -> 190/96. Fellow would still like to bring BP more. Will give labetalol.

## 2022-02-17 NOTE — ED PROVIDER NOTE - NS ED ROS FT
Constitutional: No fevers, chills, or malaise.  HEENT: No headache, visual changes, eye pain, hearing changes, ear pain, running nose, or sore throat.  Cardiac:  Reports chest pain. No SOB, leg edema, or leg pain.  Respiratory:  No cough, respiratory distress, or hemoptysis.  GI:  No nausea, vomiting, diarrhea, or abdominal pain.  :  No dysuria, frequency, or urgency.  MS:  No myalgia, muscle weakness, joint pain or back pain.  Neuro:  No dizziness, LOC, paralysis, or N/T.  Skin:  No skin rash.   Endocrine: No polyuria, polyphagia, or polydipsia.

## 2022-02-17 NOTE — H&P ADULT - HISTORY OF PRESENT ILLNESS
57 yo male w/ PMH of ETOH abuse, smoker, uncontrolled HTN recently placed on amlodipine presents for chest pain and palpitations x 4 days.  Was here 2 days ago for same complaint, was DCed with amlodipine RX, still having pain and palpitations so returned.  Left sided pinching pain, radiates to retrosternal area and occasionally L arm, no alleviating/provoking factors, denies having had before.  Denies SOB, leg pain, leg swelling, hx of blood clots, hemoptysis, recent travel/surgery/immobilization, URI sxs.  Brother had "small MI" in 40s. Has not seen a cardiologist.

## 2022-02-18 LAB
ALBUMIN SERPL ELPH-MCNC: 3.7 G/DL — SIGNIFICANT CHANGE UP (ref 3.5–5.2)
ALP SERPL-CCNC: 85 U/L — SIGNIFICANT CHANGE UP (ref 30–115)
ALT FLD-CCNC: 78 U/L — HIGH (ref 0–41)
APTT BLD: 38.2 SEC — SIGNIFICANT CHANGE UP (ref 27–39.2)
AST SERPL-CCNC: 88 U/L — HIGH (ref 0–41)
BILIRUB DIRECT SERPL-MCNC: 0.4 MG/DL — HIGH (ref 0–0.3)
BILIRUB INDIRECT FLD-MCNC: 0.7 MG/DL — SIGNIFICANT CHANGE UP (ref 0.2–1.2)
BILIRUB SERPL-MCNC: 1.1 MG/DL — SIGNIFICANT CHANGE UP (ref 0.2–1.2)
HCV AB S/CO SERPL IA: 0.04 COI — SIGNIFICANT CHANGE UP
HCV AB SERPL-IMP: SIGNIFICANT CHANGE UP
INR BLD: 1.04 RATIO — SIGNIFICANT CHANGE UP (ref 0.65–1.3)
LDH SERPL L TO P-CCNC: 153 U/L — SIGNIFICANT CHANGE UP (ref 50–242)
PROT SERPL-MCNC: 6.3 G/DL — SIGNIFICANT CHANGE UP (ref 6–8)
PROTHROM AB SERPL-ACNC: 11.9 SEC — SIGNIFICANT CHANGE UP (ref 9.95–12.87)
T3 SERPL-MCNC: 152 NG/DL — SIGNIFICANT CHANGE UP (ref 80–200)
T4 AB SER-ACNC: 8.7 UG/DL — SIGNIFICANT CHANGE UP (ref 4.6–12)
TSH SERPL-MCNC: 4.7 UIU/ML — HIGH (ref 0.27–4.2)

## 2022-02-18 PROCEDURE — 70450 CT HEAD/BRAIN W/O DYE: CPT | Mod: 26

## 2022-02-18 PROCEDURE — 99233 SBSQ HOSP IP/OBS HIGH 50: CPT

## 2022-02-18 RX ORDER — FOLIC ACID 0.8 MG
1 TABLET ORAL DAILY
Refills: 0 | Status: DISCONTINUED | OUTPATIENT
Start: 2022-02-18 | End: 2022-02-20

## 2022-02-18 RX ORDER — LISINOPRIL 2.5 MG/1
10 TABLET ORAL AT BEDTIME
Refills: 0 | Status: DISCONTINUED | OUTPATIENT
Start: 2022-02-18 | End: 2022-02-20

## 2022-02-18 RX ORDER — THIAMINE MONONITRATE (VIT B1) 100 MG
100 TABLET ORAL DAILY
Refills: 0 | Status: DISCONTINUED | OUTPATIENT
Start: 2022-02-18 | End: 2022-02-20

## 2022-02-18 RX ORDER — ENOXAPARIN SODIUM 100 MG/ML
40 INJECTION SUBCUTANEOUS DAILY
Refills: 0 | Status: DISCONTINUED | OUTPATIENT
Start: 2022-02-18 | End: 2022-02-20

## 2022-02-18 RX ORDER — SODIUM CHLORIDE 9 MG/ML
250 INJECTION INTRAMUSCULAR; INTRAVENOUS; SUBCUTANEOUS ONCE
Refills: 0 | Status: COMPLETED | OUTPATIENT
Start: 2022-02-18 | End: 2022-02-18

## 2022-02-18 RX ORDER — AMLODIPINE BESYLATE 2.5 MG/1
10 TABLET ORAL DAILY
Refills: 0 | Status: DISCONTINUED | OUTPATIENT
Start: 2022-02-18 | End: 2022-02-20

## 2022-02-18 RX ADMIN — Medication 1 MILLIGRAM(S): at 12:08

## 2022-02-18 RX ADMIN — ENOXAPARIN SODIUM 40 MILLIGRAM(S): 100 INJECTION SUBCUTANEOUS at 12:09

## 2022-02-18 RX ADMIN — AMLODIPINE BESYLATE 10 MILLIGRAM(S): 2.5 TABLET ORAL at 05:46

## 2022-02-18 RX ADMIN — SODIUM CHLORIDE 500 MILLILITER(S): 9 INJECTION INTRAMUSCULAR; INTRAVENOUS; SUBCUTANEOUS at 17:50

## 2022-02-18 RX ADMIN — LISINOPRIL 10 MILLIGRAM(S): 2.5 TABLET ORAL at 22:02

## 2022-02-18 RX ADMIN — Medication 100 MILLIGRAM(S): at 12:09

## 2022-02-18 NOTE — CONSULT NOTE ADULT - CONSULT REASON
'chest pain'
alcohol use history
Polycythemia (likely 2/2 smoking/hypoxia) Thrombocytopenia (EtOH?) Headache and blurry vision

## 2022-02-18 NOTE — PATIENT PROFILE ADULT - FALL HARM RISK - HARM RISK INTERVENTIONS

## 2022-02-18 NOTE — CONSULT NOTE ADULT - ASSESSMENT
Assessment  55 yo male w/ PMH of ETOH abuse, smoker, uncontrolled HTN recently placed on amlodipine presents for chest pain and palpitations x 4 days. In the ED /117, , Temp 96.8, RR 18 O2% sat 99 on room air Labs were significant for Hgb 19.5, Hct 54.8, MCV 96.1, MCHC 34.2, Plt 28, WBC 6.88, trop neg x2. CT head revealed mild chronic microvascular changes and chronic cortical lacunar infarct.     Impression  - polycythemia possibly from smoking induced hypoxia, however given he is sating 99% on room air polycythemia vera or other EPO producing neoplastic syndromes cannot be ruled out.   - polycythemia could be contributing to hypertensive emergency.    - Thrombocytopenia - may be secondary to EtOH use or neoplastic process which is contributing to polycythemia    Recs  - EPO  - Renal artery duplex  - Renal US  -     THIS IS AN INCOMPLETE NOTE     Assessment  55 yo male w/ PMH of ETOH abuse, smoker, Sleep apnea uncontrolled HTN recently placed on amlodipine presents for chest pain and palpitations x 4 days. In the ED /117, , Temp 96.8, RR 18 O2% sat 99 on room air Labs were significant for Hgb 19.5, Hct 54.8, MCV 96.1, MCHC 34.2, Plt 28, WBC 6.88, trop neg x2. CT head revealed mild chronic microvascular changes and chronic cortical lacunar infarct.     Impression  - polycythemia likely secondary to smoking and sleep apnea not on CPAP, however given he is sating 99% on room air polycythemia vera or other EPO producing neoplastic syndromes cannot be ruled out.   - polycythemia could be contributing to hypertensive emergency.    - Thrombocytopenia - Likely secondary to EtOH use    Recs  - EPO  - Jak2  - splenic ultrasound  - Pt. counseled on smoking cessation  - pt. counseled on using CPAP machine and following pulmonology    THIS IS AN INCOMPLETE NOTE     Assessment  57 yo male w/ PMH of ETOH abuse, smoker, Sleep apnea uncontrolled HTN recently placed on amlodipine presents for chest pain and palpitations x 4 days. In the ED /117, , Temp 96.8, RR 18 O2% sat 99 on room air Labs are significant for Hgb 19.5, Hct 54.8, MCV 96.1, MCHC 34.2, Plt 28, WBC 6.88, trop neg x2. CT head revealed mild chronic microvascular changes and chronic cortical lacunar infarct.     Impression  - polycythemia likely secondary to smoking and sleep apnea not on CPAP, however given he is sating 99% on room air polycythemia vera or other EPO producing neoplastic syndromes cannot be ruled out.   - polycythemia could be contributing to hypertensive emergency.    - Thrombocytopenia - Likely secondary to EtOH use    Recs  - EPO  - Jak2  - splenic ultrasound  - ABG  - HIV screening  - Hep b screening  - Pt. counseled on smoking cessation  - pt. counseled on using CPAP machine and following pulmonology     Assessment  55 yo male w/ PMH of ETOH abuse, smoker, Sleep apnea uncontrolled HTN recently placed on amlodipine presents for chest pain and palpitations x 4 days. In the ED /117, , Temp 96.8, RR 18 O2% sat 99 on room air Labs are significant for Hgb 19.5, Hct 54.8, MCV 96.1, MCHC 34.2, Plt 28, WBC 6.88, trop neg x2. CT head revealed mild chronic microvascular changes and chronic cortical lacunar infarct.     Impression  - polycythemia likely secondary to smoking and sleep apnea not on CPAP, however given he is sating 99% on room air polycythemia vera or other EPO producing neoplastic syndromes cannot be ruled out.   - polycythemia could be contributing to hypertensive emergency.    - Thrombocytopenia - Likely secondary to EtOH use    Recs  - EPO level  - Jak2  - splenic ultrasound  - ABG  - HIV screening  - Hep b screening  - Pt. counseled on smoking cessation  - pt. counseled on using CPAP machine and following pulmonology  - pt counseled on alcohol abstinence  - Phlebotomized afternoon 2/18/22, goal if secondary polycythemia ~< 18  - Monitor CBC, platelets should rise within days if it is due to alcohol use

## 2022-02-18 NOTE — CONSULT NOTE ADULT - SUBJECTIVE AND OBJECTIVE BOX
Patient is a 56y old  Male who presents with a chief complaint of uncontrolled bp / chest 'thumps' / headache. (17 Feb 2022 21:40)      HPI:  57 yo male w/ PMH of ETOH abuse, smoker, uncontrolled HTN recently placed on amlodipine presents for chest pain and palpitations x 4 days.  Was here 2 days ago for same complaint, was DCed with amlodipine RX, still having pain and palpitations so returned.  Left sided pinching pain, radiates to retrosternal area and occasionally L arm, no alleviating/provoking factors, denies having had before.  Denies SOB, leg pain, leg swelling, hx of blood clots, hemoptysis, recent travel/surgery/immobilization, URI sxs.  Brother had "small MI" in 40s. Has not seen a cardiologist. (17 Feb 2022 22:53).     In the ED /117, , Temp 96.8, RR 18. Labs were significant for Hgb 19.5, Hct 54.8, MCV 96.1, MCHC 34.2, Plt 28, WBC 6.88, trop neg x2       ROS:  Negative except for:    PAST MEDICAL & SURGICAL HISTORY:  HTN (hypertension)        SOCIAL HISTORY:    FAMILY HISTORY:  Family history of atherosclerosis (Sibling)        MEDICATIONS  (STANDING):  amLODIPine   Tablet 10 milliGRAM(s) Oral daily  enoxaparin Injectable 40 milliGRAM(s) SubCutaneous daily  folic acid 1 milliGRAM(s) Oral daily  lisinopril 10 milliGRAM(s) Oral at bedtime  thiamine 100 milliGRAM(s) Oral daily    MEDICATIONS  (PRN):    Height (cm): 177.8 (02-17-22 @ 16:13)  Weight (kg): 81.6 (02-17-22 @ 16:13)  BMI (kg/m2): 25.8 (02-17-22 @ 16:13)  BSA (m2): 2 (02-17-22 @ 16:13)  Allergies    No Known Allergies    Intolerances        Vital Signs Last 24 Hrs  T(C): 36.9 (18 Feb 2022 05:48), Max: 36.9 (18 Feb 2022 05:48)  T(F): 98.4 (18 Feb 2022 05:48), Max: 98.4 (18 Feb 2022 05:48)  HR: 82 (18 Feb 2022 05:48) (69 - 112)  BP: 169/87 (18 Feb 2022 05:48) (154/77 - 225/107)  BP(mean): --  RR: 18 (18 Feb 2022 05:48) (18 - 19)  SpO2: 98% (18 Feb 2022 05:48) (98% - 99%)    PHYSICAL EXAM  General: NAD  HEENT: NCAT, EOMI  Neck: supple  CV: normal S1/S2 , RRR  Lungs: Clear to ascultation bilaterally, no respiratory distress  Abdomen: soft non-tender non-distended  Ext: No edema  Skin: no rashes and no petechiae  Neuro: alert and oriented X 4, no focal deficits      LABS:                          19.5   6.88  )-----------( 28       ( 17 Feb 2022 18:11 )             54.8         Mean Cell Volume : 96.1 fL  Mean Cell Hemoglobin : 34.2 pg  Mean Cell Hemoglobin Concentration : 35.6 g/dL  Auto Neutrophil # : 4.92 K/uL  Auto Lymphocyte # : 1.09 K/uL  Auto Monocyte # : 0.70 K/uL  Auto Eosinophil # : 0.09 K/uL  Auto Basophil # : 0.04 K/uL  Auto Neutrophil % : 71.5 %  Auto Lymphocyte % : 15.8 %  Auto Monocyte % : 10.2 %  Auto Eosinophil % : 1.3 %  Auto Basophil % : 0.6 %      Serial CBC's  02-17 @ 18:11  Hct-54.8 / Hgb-19.5 / Plat-28 / RBC-5.70 / WBC-6.88  Serial CBC's  02-15 @ 22:07  Hct-52.0 / Hgb-18.5 / Plat-28 / RBC-5.43 / WBC-6.93      02-17    134<L>  |  97<L>  |  8<L>  ----------------------------<  100<H>  4.5   |  23  |  1.0    Ca    8.8      17 Feb 2022 18:11  Mg     2.0     02-17    TPro  6.3  /  Alb  3.7  /  TBili  1.1  /  DBili  0.4<H>  /  AST  88<H>  /  ALT  78<H>  /  AlkPhos  85  02-18      PT/INR - ( 18 Feb 2022 05:50 )   PT: 11.90 sec;   INR: 1.04 ratio         PTT - ( 18 Feb 2022 05:50 )  PTT:38.2 sec                BLOOD SMEAR INTERPRETATION:       RADIOLOGY & ADDITIONAL STUDIES:  < from: CT Head No Cont (02.18.22 @ 03:09) >  IMPRESSION:  No acute intracranial pathology. Noevidence of midline shift, mass   effect or intracranial hemorrhage.    Mild chronic microvascular type changes as well as a chronic cortical   appearing lacunar infarct involving the right lentiform nucleus.    --- End of Report ---    < end of copied text >  < from: Xray Chest 2 Views PA/Lat (02.17.22 @ 19:17) >  Impression:    No evidence of acute cardiopulmonary disease.    < end of copied text >     Patient is a 56y old  Male who presents with a chief complaint of uncontrolled bp / chest 'thumps' / headache. (17 Feb 2022 21:40)      HPI:  57 yo male w/ PMH of ETOH abuse, smoker 0.5 packs per day, Sleep apnea not on CPAP, uncontrolled HTN recently placed on amlodipine presents for chest pain and palpitations x 4 days.  Was here 2 days ago for same complaint, was DCed with amlodipine RX, still having pain and palpitations so returned.  Left sided pinching pain, radiates to retrosternal area and occasionally L arm, no alleviating/provoking factors, denies having had before. He states he has blurriness in his vision and headaches when his blood pressure is very high. He is able to go up a flight of stairs and walk around the block without dyspnea. He denies taking any work-out supplements. Denies SOB, leg pain, leg swelling, hx of blood clots, hemoptysis, recent travel/surgery/immobilization, URI sxs.  Brother had "small MI" in 40s. Has not seen a cardiologist. (17 Feb 2022 22:53).     In the ED /117, , Temp 96.8, RR 18. Labs were significant for Hgb 19.5, Hct 54.8, MCV 96.1, MCHC 34.2, Plt 28, WBC 6.88, trop neg x2       ROS:  Negative except for:    PAST MEDICAL & SURGICAL HISTORY:  HTN (hypertension)        SOCIAL HISTORY:    FAMILY HISTORY:  Family history of atherosclerosis (Sibling)        MEDICATIONS  (STANDING):  amLODIPine   Tablet 10 milliGRAM(s) Oral daily  enoxaparin Injectable 40 milliGRAM(s) SubCutaneous daily  folic acid 1 milliGRAM(s) Oral daily  lisinopril 10 milliGRAM(s) Oral at bedtime  thiamine 100 milliGRAM(s) Oral daily    MEDICATIONS  (PRN):    Height (cm): 177.8 (02-17-22 @ 16:13)  Weight (kg): 81.6 (02-17-22 @ 16:13)  BMI (kg/m2): 25.8 (02-17-22 @ 16:13)  BSA (m2): 2 (02-17-22 @ 16:13)  Allergies    No Known Allergies    Intolerances        Vital Signs Last 24 Hrs  T(C): 36.9 (18 Feb 2022 05:48), Max: 36.9 (18 Feb 2022 05:48)  T(F): 98.4 (18 Feb 2022 05:48), Max: 98.4 (18 Feb 2022 05:48)  HR: 82 (18 Feb 2022 05:48) (69 - 112)  BP: 169/87 (18 Feb 2022 05:48) (154/77 - 225/107)  BP(mean): --  RR: 18 (18 Feb 2022 05:48) (18 - 19)  SpO2: 98% (18 Feb 2022 05:48) (98% - 99%)    PHYSICAL EXAM  General: NAD  HEENT: NCAT, EOMI  Neck: supple  CV: normal S1/S2 , RRR  Lungs: Clear to ascultation bilaterally, no respiratory distress  Abdomen: soft non-tender non-distended  Ext: No edema  Skin: no rashes and no petechiae. Face is flush  Neuro: alert and oriented X 4, no focal deficits      LABS:                          19.5   6.88  )-----------( 28       ( 17 Feb 2022 18:11 )             54.8         Mean Cell Volume : 96.1 fL  Mean Cell Hemoglobin : 34.2 pg  Mean Cell Hemoglobin Concentration : 35.6 g/dL  Auto Neutrophil # : 4.92 K/uL  Auto Lymphocyte # : 1.09 K/uL  Auto Monocyte # : 0.70 K/uL  Auto Eosinophil # : 0.09 K/uL  Auto Basophil # : 0.04 K/uL  Auto Neutrophil % : 71.5 %  Auto Lymphocyte % : 15.8 %  Auto Monocyte % : 10.2 %  Auto Eosinophil % : 1.3 %  Auto Basophil % : 0.6 %      Serial CBC's  02-17 @ 18:11  Hct-54.8 / Hgb-19.5 / Plat-28 / RBC-5.70 / WBC-6.88  Serial CBC's  02-15 @ 22:07  Hct-52.0 / Hgb-18.5 / Plat-28 / RBC-5.43 / WBC-6.93      02-17    134<L>  |  97<L>  |  8<L>  ----------------------------<  100<H>  4.5   |  23  |  1.0    Ca    8.8      17 Feb 2022 18:11  Mg     2.0     02-17    TPro  6.3  /  Alb  3.7  /  TBili  1.1  /  DBili  0.4<H>  /  AST  88<H>  /  ALT  78<H>  /  AlkPhos  85  02-18      PT/INR - ( 18 Feb 2022 05:50 )   PT: 11.90 sec;   INR: 1.04 ratio         PTT - ( 18 Feb 2022 05:50 )  PTT:38.2 sec                BLOOD SMEAR INTERPRETATION:       RADIOLOGY & ADDITIONAL STUDIES:  < from: CT Head No Cont (02.18.22 @ 03:09) >  IMPRESSION:  No acute intracranial pathology. Noevidence of midline shift, mass   effect or intracranial hemorrhage.    Mild chronic microvascular type changes as well as a chronic cortical   appearing lacunar infarct involving the right lentiform nucleus.    --- End of Report ---    < end of copied text >  < from: Xray Chest 2 Views PA/Lat (02.17.22 @ 19:17) >  Impression:    No evidence of acute cardiopulmonary disease.    < end of copied text >     Patient is a 56y old  Male who presents with a chief complaint of uncontrolled bp / chest 'thumps' / headache. (17 Feb 2022 21:40)      HPI:  57 yo male w/ PMH of ETOH abuse, smoker 0.5 packs per day, Sleep apnea not on CPAP, uncontrolled HTN recently placed on amlodipine presents for chest pain and palpitations x 4 days.  Was here 2 days ago for same complaint, was DCed with amlodipine RX, still having pain and palpitations so returned.  Left sided pinching pain, radiates to retrosternal area and occasionally L arm, no alleviating/provoking factors, denies having had before. He states he has blurriness in his vision and headaches when his blood pressure is very high. He is able to go up a flight of stairs and walk around the block without dyspnea. He denies taking any work-out supplements. Denies SOB, leg pain, leg swelling, hx of blood clots, hemoptysis, recent travel/surgery/immobilization, URI sxs.  Brother had "small MI" in 40s. Has not seen a cardiologist. (17 Feb 2022 22:53).     In the ED /117, , Temp 96.8, RR 18. Labs were significant for Hgb 19.5, Hct 54.8, MCV 96.1, MCHC 34.2, Plt 28, WBC 6.88, trop neg x2       ROS:  Negative except for:  Headache and dizziness    PAST MEDICAL & SURGICAL HISTORY:  HTN (hypertension)        SOCIAL HISTORY:  - Current smoker 0.5 packs per day  - EtOH abuse    FAMILY HISTORY:  Family history of atherosclerosis (Sibling)        MEDICATIONS  (STANDING):  amLODIPine   Tablet 10 milliGRAM(s) Oral daily  enoxaparin Injectable 40 milliGRAM(s) SubCutaneous daily  folic acid 1 milliGRAM(s) Oral daily  lisinopril 10 milliGRAM(s) Oral at bedtime  thiamine 100 milliGRAM(s) Oral daily    MEDICATIONS  (PRN):    Height (cm): 177.8 (02-17-22 @ 16:13)  Weight (kg): 81.6 (02-17-22 @ 16:13)  BMI (kg/m2): 25.8 (02-17-22 @ 16:13)  BSA (m2): 2 (02-17-22 @ 16:13)  Allergies    No Known Allergies    Intolerances        Vital Signs Last 24 Hrs  T(C): 36.9 (18 Feb 2022 05:48), Max: 36.9 (18 Feb 2022 05:48)  T(F): 98.4 (18 Feb 2022 05:48), Max: 98.4 (18 Feb 2022 05:48)  HR: 82 (18 Feb 2022 05:48) (69 - 112)  BP: 169/87 (18 Feb 2022 05:48) (154/77 - 225/107)  BP(mean): --  RR: 18 (18 Feb 2022 05:48) (18 - 19)  SpO2: 98% (18 Feb 2022 05:48) (98% - 99%)    PHYSICAL EXAM  General: NAD  HEENT: NCAT, EOMI  Neck: supple  CV: normal S1/S2 , RRR  Lungs: Clear to ascultation bilaterally, no respiratory distress  Abdomen: soft non-tender non-distended  Ext: No edema  Skin: no rashes and no petechiae. Facial flushing  Neuro: alert and oriented X 4, no focal deficits      LABS:                          19.5   6.88  )-----------( 28       ( 17 Feb 2022 18:11 )             54.8         Mean Cell Volume : 96.1 fL  Mean Cell Hemoglobin : 34.2 pg  Mean Cell Hemoglobin Concentration : 35.6 g/dL  Auto Neutrophil # : 4.92 K/uL  Auto Lymphocyte # : 1.09 K/uL  Auto Monocyte # : 0.70 K/uL  Auto Eosinophil # : 0.09 K/uL  Auto Basophil # : 0.04 K/uL  Auto Neutrophil % : 71.5 %  Auto Lymphocyte % : 15.8 %  Auto Monocyte % : 10.2 %  Auto Eosinophil % : 1.3 %  Auto Basophil % : 0.6 %      Serial CBC's  02-17 @ 18:11  Hct-54.8 / Hgb-19.5 / Plat-28 / RBC-5.70 / WBC-6.88  Serial CBC's  02-15 @ 22:07  Hct-52.0 / Hgb-18.5 / Plat-28 / RBC-5.43 / WBC-6.93      02-17    134<L>  |  97<L>  |  8<L>  ----------------------------<  100<H>  4.5   |  23  |  1.0    Ca    8.8      17 Feb 2022 18:11  Mg     2.0     02-17    TPro  6.3  /  Alb  3.7  /  TBili  1.1  /  DBili  0.4<H>  /  AST  88<H>  /  ALT  78<H>  /  AlkPhos  85  02-18      PT/INR - ( 18 Feb 2022 05:50 )   PT: 11.90 sec;   INR: 1.04 ratio         PTT - ( 18 Feb 2022 05:50 )  PTT:38.2 sec                BLOOD SMEAR INTERPRETATION:       RADIOLOGY & ADDITIONAL STUDIES:  < from: CT Head No Cont (02.18.22 @ 03:09) >  IMPRESSION:  No acute intracranial pathology. Noevidence of midline shift, mass   effect or intracranial hemorrhage.    Mild chronic microvascular type changes as well as a chronic cortical   appearing lacunar infarct involving the right lentiform nucleus.    --- End of Report ---    < end of copied text >  < from: Xray Chest 2 Views PA/Lat (02.17.22 @ 19:17) >  Impression:    No evidence of acute cardiopulmonary disease.    < end of copied text >     Patient is a 56y old  Male who presents with a chief complaint of uncontrolled bp / chest 'thumps' / headache. (17 Feb 2022 21:40)      HPI:  57 yo male w/ PMH of ETOH abuse, smoker 0.5 packs per day, Sleep apnea not on CPAP, uncontrolled HTN recently placed on amlodipine presents for chest pain and palpitations x 4 days.  Was here 2 days ago for same complaint, was DCed with amlodipine RX, still having pain and palpitations so returned.  Left sided pinching pain, radiates to retrosternal area and occasionally L arm, no alleviating/provoking factors, denies having had before. He states he has blurriness in his vision and headaches when his blood pressure is very high. He is able to go up a flight of stairs and walk around the block without dyspnea. He denies taking any work-out supplements. Denies SOB, leg pain, leg swelling, hx of blood clots, hemoptysis, recent travel/surgery/immobilization, URI sxs.  Brother had "small MI" in 40s. Has not seen a cardiologist. (17 Feb 2022 22:53).     In the ED /117, , Temp 96.8, RR 18. Labs were significant for Hgb 19.5, Hct 54.8, MCV 96.1, MCHC 34.2, Plt 28, WBC 6.88, trop neg x2     Patient's platelets have fluctuated for years. He is an active drinker, has multiple drinks daily. He is also an active smoker. Hemoglobin has been rising since 2017.     ROS:  Negative except for:  Headache and dizziness    PAST MEDICAL & SURGICAL HISTORY:  HTN (hypertension)        SOCIAL HISTORY:  - Current smoker 0.5 packs per day  - EtOH abuse    FAMILY HISTORY:  Family history of atherosclerosis (Sibling)        MEDICATIONS  (STANDING):  amLODIPine   Tablet 10 milliGRAM(s) Oral daily  enoxaparin Injectable 40 milliGRAM(s) SubCutaneous daily  folic acid 1 milliGRAM(s) Oral daily  lisinopril 10 milliGRAM(s) Oral at bedtime  thiamine 100 milliGRAM(s) Oral daily    MEDICATIONS  (PRN):    Height (cm): 177.8 (02-17-22 @ 16:13)  Weight (kg): 81.6 (02-17-22 @ 16:13)  BMI (kg/m2): 25.8 (02-17-22 @ 16:13)  BSA (m2): 2 (02-17-22 @ 16:13)  Allergies    No Known Allergies    Intolerances        Vital Signs Last 24 Hrs  T(C): 36.9 (18 Feb 2022 05:48), Max: 36.9 (18 Feb 2022 05:48)  T(F): 98.4 (18 Feb 2022 05:48), Max: 98.4 (18 Feb 2022 05:48)  HR: 82 (18 Feb 2022 05:48) (69 - 112)  BP: 169/87 (18 Feb 2022 05:48) (154/77 - 225/107)  BP(mean): --  RR: 18 (18 Feb 2022 05:48) (18 - 19)  SpO2: 98% (18 Feb 2022 05:48) (98% - 99%)    PHYSICAL EXAM  General: NAD  HEENT: NCAT, EOMI  Neck: supple  CV: normal S1/S2 , RRR  Lungs: Clear to ascultation bilaterally, no respiratory distress  Abdomen: soft non-tender non-distended  Ext: No edema  Skin: no rashes and no petechiae. Facial flushing  Neuro: alert and oriented X 4, no focal deficits      LABS:                          19.5   6.88  )-----------( 28       ( 17 Feb 2022 18:11 )             54.8         Mean Cell Volume : 96.1 fL  Mean Cell Hemoglobin : 34.2 pg  Mean Cell Hemoglobin Concentration : 35.6 g/dL  Auto Neutrophil # : 4.92 K/uL  Auto Lymphocyte # : 1.09 K/uL  Auto Monocyte # : 0.70 K/uL  Auto Eosinophil # : 0.09 K/uL  Auto Basophil # : 0.04 K/uL  Auto Neutrophil % : 71.5 %  Auto Lymphocyte % : 15.8 %  Auto Monocyte % : 10.2 %  Auto Eosinophil % : 1.3 %  Auto Basophil % : 0.6 %      Serial CBC's  02-17 @ 18:11  Hct-54.8 / Hgb-19.5 / Plat-28 / RBC-5.70 / WBC-6.88  Serial CBC's  02-15 @ 22:07  Hct-52.0 / Hgb-18.5 / Plat-28 / RBC-5.43 / WBC-6.93      02-17    134<L>  |  97<L>  |  8<L>  ----------------------------<  100<H>  4.5   |  23  |  1.0    Ca    8.8      17 Feb 2022 18:11  Mg     2.0     02-17    TPro  6.3  /  Alb  3.7  /  TBili  1.1  /  DBili  0.4<H>  /  AST  88<H>  /  ALT  78<H>  /  AlkPhos  85  02-18      PT/INR - ( 18 Feb 2022 05:50 )   PT: 11.90 sec;   INR: 1.04 ratio         PTT - ( 18 Feb 2022 05:50 )  PTT:38.2 sec                BLOOD SMEAR INTERPRETATION:       RADIOLOGY & ADDITIONAL STUDIES:  < from: CT Head No Cont (02.18.22 @ 03:09) >  IMPRESSION:  No acute intracranial pathology. Noevidence of midline shift, mass   effect or intracranial hemorrhage.    Mild chronic microvascular type changes as well as a chronic cortical   appearing lacunar infarct involving the right lentiform nucleus.    --- End of Report ---    < end of copied text >  < from: Xray Chest 2 Views PA/Lat (02.17.22 @ 19:17) >  Impression:    No evidence of acute cardiopulmonary disease.    < end of copied text >

## 2022-02-18 NOTE — PROGRESS NOTE ADULT - ATTENDING COMMENTS
56M with h/x Uncontrolled HTN, Alcohol Use Disorder who presents to the ER 2nd time on this admission for elevated BP.   Pt has also been trying to quite drinking alcohol and needs assistance to stop.   At time of evaluation on encounter pt had no complaints.     Vital Signs Last 24 Hrs  T(C): 36.9 (18 Feb 2022 05:48), Max: 36.9 (18 Feb 2022 05:48)  T(F): 98.4 (18 Feb 2022 05:48), Max: 98.4 (18 Feb 2022 05:48)  HR: 82 (18 Feb 2022 05:48) (69 - 112)  BP: 169/87 (18 Feb 2022 05:48) (154/77 - 225/107)  RR: 18 (18 Feb 2022 05:48) (18 - 19)  SpO2: 98% (18 Feb 2022 05:48) (98% - 99%)    GEN: NAD  Neck: No increase JVD  CV: NL S1, S2  Resp: CTA B/L  GI: +BS, Soft, NT, ND  Ext: No e/c/c   MS: AOx3                        19.5   6.88  )-----------( 28       ( 17 Feb 2022 18:11 )             54.8     02-17    134<L>  |  97<L>  |  8<L>  ----------------------------<  100<H>  4.5   |  23  |  1.0    Ca    8.8      17 Feb 2022 18:11    Mg     2.0     02-17    TPro  6.3  /  Alb  3.7  /  TBili  1.1  /  DBili  0.4<H>  /  AST  88<H>  /  ALT  78<H>  /  AlkPhos  85  02-18    Diagnosis Line Sinus tachycardia  Right atrial enlargement  Right superior axis deviation  Inferior infarct , age undetermined  Cannot rule out Anterior infarct , age undetermined  Abnormal ECG    IMPRESSION:  No acute intracranial pathology. No evidence of midline shift, mass effect or intracranial hemorrhage.    Mild chronic microvascular type changes as well as a chronic cortical appearing lacunar infarct involving the right lentiform nucleus     NST: Feb 2021   INTERPRETATION:  ADENOSINE AND REST MYOCARDIAL PERFUSION SPECT TOMOGRAPHY, WALL MOTION ANALYSIS, LVEF CALCULATION  Reason: Chest pain  1. NORMAL ADENOSINE / REST MYOCARDIAL PERFUSION SPECT TOMOGRAPHY, WITH NO EVIDENCE FOR ISCHEMIA DURING ADENOSINE INFUSION.  2. NORMAL RESTING LEFT VENTRICULAR WALL MOTION AND WALL THICKENING.  3. LEFT VENTRICULAR EJECTION FRACTION OF  72 % WHICH IS WITHIN RANGE OF NORMAL    IMPRESSION:   1. Chest Pain   2. Thrombocytopenia   3. Polycythemia   4. Suspected EMILIANO  5. Chronic Smoker   6. Accelerated HTN   7. Possible non-compliance with meds  8. Thoracic Aneurism 4.2Cm Feb 2021 (remains hemodynamically stable no need to check CT Dissection Protocol)     Plan:  CATCH TEAM for Alcohol Use Disorder Counseling (discussed)  JAC2 Mutation testing   EPO / LDH   Ultrasound Liver/Spleen/GB  c/w Lisinopril/Norvasc  Can add coreg if bp not controlled   CV Consultation   Telemetry for now and trend trops / ekg   f/u CBC/CMP am - Hepatitis Profile and If not vaccinated Hep B/A then pt should f/u w/ PMD for vaccination given he's ETOH abuse    DVT/GI Proph     Dispo: Acute

## 2022-02-18 NOTE — CONSULT NOTE ADULT - SUBJECTIVE AND OBJECTIVE BOX
57 yo male w/ PMH of ETOH abuse, smoker, uncontrolled HTN recently placed on amlodipine presents for chest pain and palpitations x 4 days, admitted for management of chest pain.     Spoke with primary team who reports that patient is without acute signs of alcohol withdrawal and request that patient be set up with outpatient services.     CATCH team social workers to follow.

## 2022-02-18 NOTE — PROGRESS NOTE ADULT - ASSESSMENT
IMPRESSION:  # Chest Pain r/o ACS  # h/o CAD on CCTA  # Active Smoker  # EtOH Use Disorder  # Chronic Thrombocytopenia  # Polycythemia  # HTNsive Urgency  # AAA 4.6cm (2/2021)    PLAN/ACTIVE PROBLEMS:  # Chest Pain r/o ACS  # h/o CAD on CCTA  # HTNsive Urgency  # AAA 4.6cm (2/2021)  - Tele  - 2D Echo  - TSH/T4  - Lifestyle Modifications  - BP Control w/Amlodipine and Lisinopril  - Would add BB like Coreg in coming days if uncontrolled (not now d/t acute drop in BP)    # Active Smoker  # EtOH Use Disorder  - No signs of withdrawal  - NRT while inpatient  - Counselled on cessation    # Chronic Thrombocytopenia - likely 2/2 to liver dysfunction  # Transaminitis - likely alcoholic liver dz  # Polycythemia  # Untreated EMILIANO  - possibly secondary to chronic hypoxia  - Check retic, ferritin, iron w/tibc, EPO, ASHU 2,   - US ABD complete    DISPOSITION: Acute

## 2022-02-19 LAB
ANION GAP SERPL CALC-SCNC: 13 MMOL/L — SIGNIFICANT CHANGE UP (ref 7–14)
BASOPHILS # BLD AUTO: 0.03 K/UL — SIGNIFICANT CHANGE UP (ref 0–0.2)
BASOPHILS NFR BLD AUTO: 0.5 % — SIGNIFICANT CHANGE UP (ref 0–1)
BUN SERPL-MCNC: 10 MG/DL — SIGNIFICANT CHANGE UP (ref 10–20)
CALCIUM SERPL-MCNC: 8.9 MG/DL — SIGNIFICANT CHANGE UP (ref 8.5–10.1)
CHLORIDE SERPL-SCNC: 101 MMOL/L — SIGNIFICANT CHANGE UP (ref 98–110)
CO2 SERPL-SCNC: 24 MMOL/L — SIGNIFICANT CHANGE UP (ref 17–32)
CREAT SERPL-MCNC: 0.8 MG/DL — SIGNIFICANT CHANGE UP (ref 0.7–1.5)
EOSINOPHIL # BLD AUTO: 0.12 K/UL — SIGNIFICANT CHANGE UP (ref 0–0.7)
EOSINOPHIL NFR BLD AUTO: 1.8 % — SIGNIFICANT CHANGE UP (ref 0–8)
FERRITIN SERPL-MCNC: 1261 NG/ML — HIGH (ref 30–400)
FOLATE SERPL-MCNC: 4.9 NG/ML — SIGNIFICANT CHANGE UP
GLUCOSE SERPL-MCNC: 111 MG/DL — HIGH (ref 70–99)
HAV IGM SER-ACNC: SIGNIFICANT CHANGE UP
HBV CORE IGM SER-ACNC: SIGNIFICANT CHANGE UP
HBV SURFACE AG SER-ACNC: SIGNIFICANT CHANGE UP
HCT VFR BLD CALC: 55 % — HIGH (ref 42–52)
HCV AB S/CO SERPL IA: 0.14 S/CO — SIGNIFICANT CHANGE UP (ref 0–0.99)
HCV AB SERPL-IMP: SIGNIFICANT CHANGE UP
HGB BLD-MCNC: 18.9 G/DL — HIGH (ref 14–18)
IMM GRANULOCYTES NFR BLD AUTO: 0.3 % — SIGNIFICANT CHANGE UP (ref 0.1–0.3)
LYMPHOCYTES # BLD AUTO: 1.37 K/UL — SIGNIFICANT CHANGE UP (ref 1.2–3.4)
LYMPHOCYTES # BLD AUTO: 20.7 % — SIGNIFICANT CHANGE UP (ref 20.5–51.1)
MCHC RBC-ENTMCNC: 33.8 PG — HIGH (ref 27–31)
MCHC RBC-ENTMCNC: 34.4 G/DL — SIGNIFICANT CHANGE UP (ref 32–37)
MCV RBC AUTO: 98.2 FL — HIGH (ref 80–94)
MONOCYTES # BLD AUTO: 0.75 K/UL — HIGH (ref 0.1–0.6)
MONOCYTES NFR BLD AUTO: 11.3 % — HIGH (ref 1.7–9.3)
NEUTROPHILS # BLD AUTO: 4.34 K/UL — SIGNIFICANT CHANGE UP (ref 1.4–6.5)
NEUTROPHILS NFR BLD AUTO: 65.4 % — SIGNIFICANT CHANGE UP (ref 42.2–75.2)
NRBC # BLD: 0 /100 WBCS — SIGNIFICANT CHANGE UP (ref 0–0)
PLATELET # BLD AUTO: 34 K/UL — LOW (ref 130–400)
POTASSIUM SERPL-MCNC: 3.7 MMOL/L — SIGNIFICANT CHANGE UP (ref 3.5–5)
POTASSIUM SERPL-SCNC: 3.7 MMOL/L — SIGNIFICANT CHANGE UP (ref 3.5–5)
RBC # BLD: 5.6 M/UL — SIGNIFICANT CHANGE UP (ref 4.7–6.1)
RBC # BLD: 5.6 M/UL — SIGNIFICANT CHANGE UP (ref 4.7–6.1)
RBC # FLD: 13.1 % — SIGNIFICANT CHANGE UP (ref 11.5–14.5)
RETICS #: 110.3 K/UL — SIGNIFICANT CHANGE UP (ref 25–125)
RETICS/RBC NFR: 2 % — HIGH (ref 0.5–1.5)
SODIUM SERPL-SCNC: 138 MMOL/L — SIGNIFICANT CHANGE UP (ref 135–146)
VIT B12 SERPL-MCNC: 403 PG/ML — SIGNIFICANT CHANGE UP (ref 232–1245)
WBC # BLD: 6.63 K/UL — SIGNIFICANT CHANGE UP (ref 4.8–10.8)
WBC # FLD AUTO: 6.63 K/UL — SIGNIFICANT CHANGE UP (ref 4.8–10.8)

## 2022-02-19 PROCEDURE — 76705 ECHO EXAM OF ABDOMEN: CPT | Mod: 26

## 2022-02-19 PROCEDURE — 99233 SBSQ HOSP IP/OBS HIGH 50: CPT

## 2022-02-19 RX ORDER — CARVEDILOL PHOSPHATE 80 MG/1
3.12 CAPSULE, EXTENDED RELEASE ORAL EVERY 12 HOURS
Refills: 0 | Status: DISCONTINUED | OUTPATIENT
Start: 2022-02-19 | End: 2022-02-20

## 2022-02-19 RX ORDER — ACETAMINOPHEN 500 MG
650 TABLET ORAL ONCE
Refills: 0 | Status: COMPLETED | OUTPATIENT
Start: 2022-02-19 | End: 2022-02-19

## 2022-02-19 RX ADMIN — LISINOPRIL 10 MILLIGRAM(S): 2.5 TABLET ORAL at 21:34

## 2022-02-19 RX ADMIN — CARVEDILOL PHOSPHATE 3.12 MILLIGRAM(S): 80 CAPSULE, EXTENDED RELEASE ORAL at 06:55

## 2022-02-19 RX ADMIN — Medication 650 MILLIGRAM(S): at 11:48

## 2022-02-19 RX ADMIN — Medication 1 MILLIGRAM(S): at 13:36

## 2022-02-19 RX ADMIN — Medication 100 MILLIGRAM(S): at 13:36

## 2022-02-19 RX ADMIN — AMLODIPINE BESYLATE 10 MILLIGRAM(S): 2.5 TABLET ORAL at 05:13

## 2022-02-19 RX ADMIN — Medication 650 MILLIGRAM(S): at 13:30

## 2022-02-19 RX ADMIN — CARVEDILOL PHOSPHATE 3.12 MILLIGRAM(S): 80 CAPSULE, EXTENDED RELEASE ORAL at 17:40

## 2022-02-19 RX ADMIN — ENOXAPARIN SODIUM 40 MILLIGRAM(S): 100 INJECTION SUBCUTANEOUS at 13:36

## 2022-02-19 NOTE — PROGRESS NOTE ADULT - ASSESSMENT
55 yo male w/ PMH of ETOH abuse, smoker, uncontrolled HTN recently placed on amlodipine presents for chest pain and palpitations x 4 days.     Hypertensive Emergency  CP  Polycythemia  Thrombocytopenia  ETOH abuse  Tobacco use               PLAN:    ·	Cont tele for now  ·	BP stable on Amlodipine 10 mg po daily, Lisinopril 10 mg po daily, and Coreg 3.125 mg po q 12h. Adjust the meds as needed.   ·	Monitor for withdrawal and check CIWA score  ·	Ativan 2 mg ivp for agitation and withdrawal.   ·	CE x 1 negative.   ·	ECHO and Fasting lipid profile.   ·	Hem/Onc eval noted  ·	Recommended to check EPO level and ASHU-2  ·	ABG'S  ·	HIV and Hep B screening.   ·	S/P Phlebotomy x 2. Two units of blood taken  ·	Monitor H/H  ·	US of the spleen to r/o splenomegaly, the cause of thrombocytopenia.      Progress Note Handoff    Pending (specify):  Consults_________, Tests_ECHO_______, Test Results_______, Other_Workup for polycythemia and thrombocytopenia. _______  Family discussion:  Disposition: Home___/SNF___/Other________/Unknown at this time________    Dylan Moncada MD  Spectra: [id number]

## 2022-02-20 ENCOUNTER — TRANSCRIPTION ENCOUNTER (OUTPATIENT)
Age: 57
End: 2022-02-20

## 2022-02-20 VITALS
SYSTOLIC BLOOD PRESSURE: 150 MMHG | DIASTOLIC BLOOD PRESSURE: 77 MMHG | HEART RATE: 76 BPM | RESPIRATION RATE: 18 BRPM | TEMPERATURE: 98 F

## 2022-02-20 LAB
ALBUMIN SERPL ELPH-MCNC: 3.8 G/DL — SIGNIFICANT CHANGE UP (ref 3.5–5.2)
ALP SERPL-CCNC: 80 U/L — SIGNIFICANT CHANGE UP (ref 30–115)
ALT FLD-CCNC: 77 U/L — HIGH (ref 0–41)
ANION GAP SERPL CALC-SCNC: 11 MMOL/L — SIGNIFICANT CHANGE UP (ref 7–14)
AST SERPL-CCNC: 76 U/L — HIGH (ref 0–41)
BILIRUB SERPL-MCNC: 1 MG/DL — SIGNIFICANT CHANGE UP (ref 0.2–1.2)
BUN SERPL-MCNC: 14 MG/DL — SIGNIFICANT CHANGE UP (ref 10–20)
CALCIUM SERPL-MCNC: 8.7 MG/DL — SIGNIFICANT CHANGE UP (ref 8.5–10.1)
CHLORIDE SERPL-SCNC: 101 MMOL/L — SIGNIFICANT CHANGE UP (ref 98–110)
CO2 SERPL-SCNC: 23 MMOL/L — SIGNIFICANT CHANGE UP (ref 17–32)
CREAT SERPL-MCNC: 0.8 MG/DL — SIGNIFICANT CHANGE UP (ref 0.7–1.5)
GLUCOSE SERPL-MCNC: 112 MG/DL — HIGH (ref 70–99)
HCT VFR BLD CALC: 50.5 % — SIGNIFICANT CHANGE UP (ref 42–52)
HGB BLD-MCNC: 17.7 G/DL — SIGNIFICANT CHANGE UP (ref 14–18)
MAGNESIUM SERPL-MCNC: 2 MG/DL — SIGNIFICANT CHANGE UP (ref 1.8–2.4)
MCHC RBC-ENTMCNC: 34.2 PG — HIGH (ref 27–31)
MCHC RBC-ENTMCNC: 35 G/DL — SIGNIFICANT CHANGE UP (ref 32–37)
MCV RBC AUTO: 97.7 FL — HIGH (ref 80–94)
NRBC # BLD: 0 /100 WBCS — SIGNIFICANT CHANGE UP (ref 0–0)
PLATELET # BLD AUTO: 32 K/UL — LOW (ref 130–400)
POTASSIUM SERPL-MCNC: 3.6 MMOL/L — SIGNIFICANT CHANGE UP (ref 3.5–5)
POTASSIUM SERPL-SCNC: 3.6 MMOL/L — SIGNIFICANT CHANGE UP (ref 3.5–5)
PROT SERPL-MCNC: 6.7 G/DL — SIGNIFICANT CHANGE UP (ref 6–8)
RBC # BLD: 5.17 M/UL — SIGNIFICANT CHANGE UP (ref 4.7–6.1)
RBC # FLD: 13 % — SIGNIFICANT CHANGE UP (ref 11.5–14.5)
SODIUM SERPL-SCNC: 135 MMOL/L — SIGNIFICANT CHANGE UP (ref 135–146)
WBC # BLD: 6.27 K/UL — SIGNIFICANT CHANGE UP (ref 4.8–10.8)
WBC # FLD AUTO: 6.27 K/UL — SIGNIFICANT CHANGE UP (ref 4.8–10.8)

## 2022-02-20 PROCEDURE — 99239 HOSP IP/OBS DSCHRG MGMT >30: CPT

## 2022-02-20 PROCEDURE — 93306 TTE W/DOPPLER COMPLETE: CPT | Mod: 26

## 2022-02-20 RX ORDER — FOLIC ACID 0.8 MG
1 TABLET ORAL
Qty: 30 | Refills: 0
Start: 2022-02-20 | End: 2022-03-21

## 2022-02-20 RX ORDER — LISINOPRIL 2.5 MG/1
20 TABLET ORAL DAILY
Refills: 0 | Status: DISCONTINUED | OUTPATIENT
Start: 2022-02-20 | End: 2022-02-20

## 2022-02-20 RX ORDER — AMLODIPINE BESYLATE 2.5 MG/1
1 TABLET ORAL
Qty: 0 | Refills: 0 | DISCHARGE
Start: 2022-02-20

## 2022-02-20 RX ORDER — LISINOPRIL 2.5 MG/1
20 TABLET ORAL DAILY
Refills: 0 | Status: DISCONTINUED | OUTPATIENT
Start: 2022-02-21 | End: 2022-02-20

## 2022-02-20 RX ORDER — LISINOPRIL 2.5 MG/1
1 TABLET ORAL
Qty: 30 | Refills: 3
Start: 2022-02-20 | End: 2022-06-19

## 2022-02-20 RX ORDER — THIAMINE MONONITRATE (VIT B1) 100 MG
1 TABLET ORAL
Qty: 30 | Refills: 0
Start: 2022-02-20 | End: 2022-03-21

## 2022-02-20 RX ORDER — AMLODIPINE BESYLATE 2.5 MG/1
1 TABLET ORAL
Qty: 30 | Refills: 3
Start: 2022-02-20 | End: 2022-06-19

## 2022-02-20 RX ORDER — LISINOPRIL 2.5 MG/1
1 TABLET ORAL
Qty: 0 | Refills: 0 | DISCHARGE
Start: 2022-02-20

## 2022-02-20 RX ORDER — CARVEDILOL PHOSPHATE 80 MG/1
1 CAPSULE, EXTENDED RELEASE ORAL
Qty: 60 | Refills: 3
Start: 2022-02-20 | End: 2022-06-19

## 2022-02-20 RX ORDER — THIAMINE MONONITRATE (VIT B1) 100 MG
1 TABLET ORAL
Qty: 0 | Refills: 0 | DISCHARGE
Start: 2022-02-20

## 2022-02-20 RX ORDER — CARVEDILOL PHOSPHATE 80 MG/1
1 CAPSULE, EXTENDED RELEASE ORAL
Qty: 0 | Refills: 0 | DISCHARGE
Start: 2022-02-20

## 2022-02-20 RX ORDER — FOLIC ACID 0.8 MG
1 TABLET ORAL
Qty: 0 | Refills: 0 | DISCHARGE
Start: 2022-02-20

## 2022-02-20 RX ORDER — LISINOPRIL 2.5 MG/1
10 TABLET ORAL ONCE
Refills: 0 | Status: COMPLETED | OUTPATIENT
Start: 2022-02-20 | End: 2022-02-20

## 2022-02-20 RX ADMIN — ENOXAPARIN SODIUM 40 MILLIGRAM(S): 100 INJECTION SUBCUTANEOUS at 13:04

## 2022-02-20 RX ADMIN — AMLODIPINE BESYLATE 10 MILLIGRAM(S): 2.5 TABLET ORAL at 05:25

## 2022-02-20 RX ADMIN — Medication 1 MILLIGRAM(S): at 13:05

## 2022-02-20 RX ADMIN — LISINOPRIL 10 MILLIGRAM(S): 2.5 TABLET ORAL at 10:15

## 2022-02-20 RX ADMIN — Medication 1 MILLIGRAM(S): at 10:15

## 2022-02-20 RX ADMIN — Medication 100 MILLIGRAM(S): at 13:05

## 2022-02-20 RX ADMIN — CARVEDILOL PHOSPHATE 3.12 MILLIGRAM(S): 80 CAPSULE, EXTENDED RELEASE ORAL at 05:25

## 2022-02-20 NOTE — DISCHARGE NOTE PROVIDER - CARE PROVIDER_API CALL
Davie Karimi)  Hematology; Internal Medicine; Medical Oncology  [address] [place] [address]  Phone: [phone]  Fax: [phone]  Established Patient  Follow Up Time:

## 2022-02-20 NOTE — DISCHARGE NOTE NURSING/CASE MANAGEMENT/SOCIAL WORK - PATIENT PORTAL LINK FT
You can access the FollowMyHealth Patient Portal offered by [facility] by registering at the following website: [email or web address]. By joining [facility] [employer]s FollowMyHealth portal, you will also be able to view your health information using other applications (apps) compatible with our system.

## 2022-02-20 NOTE — DISCHARGE NOTE PROVIDER - NSDCCPCAREPLAN_GEN_ALL_CORE_FT
PRINCIPAL DISCHARGE DIAGNOSIS  Diagnosis: Hypertensive urgency  Assessment and Plan of Treatment: Please take your medications regularly. Low salt diet.      SECONDARY DISCHARGE DIAGNOSES  Diagnosis: Thrombocytopenia  Assessment and Plan of Treatment: Stop drinking and smoking    Diagnosis: Polycythemia  Assessment and Plan of Treatment: Stop smoking and drinking

## 2022-02-20 NOTE — DISCHARGE NOTE NURSING/CASE MANAGEMENT/SOCIAL WORK - NSDCPEWEB_GEN_ALL_CORE
[facility] for Tobacco Control website --- [email or web address] website --- [email or web address]

## 2022-02-20 NOTE — DISCHARGE NOTE NURSING/CASE MANAGEMENT/SOCIAL WORK - NSDCPEEMAIL_GEN_ALL_CORE
[facility] for Tobacco Control email [email or web address]

## 2022-02-20 NOTE — DISCHARGE NOTE NURSING/CASE MANAGEMENT/SOCIAL WORK - NSDCPEFALRISK_GEN_ALL_CORE
For information on Fall & Injury Prevention, visit: [email or web address] OR  [email or web address] OR  https://www.cdc.gov/steadi/patient.html

## 2022-02-20 NOTE — PROGRESS NOTE ADULT - ASSESSMENT
55 yo male w/ PMH of ETOH abuse, smoker, uncontrolled HTN recently placed on amlodipine presents for chest pain and palpitations x 4 days.     Hypertensive Emergency  CP likely due #1  Polycythemia  Thrombocytopenia  ETOH abuse  Tobacco use               PLAN:    ·	No events on tele  ·	C/O headache. CIWA score is 1. Likely due to ETOH withdrawal. Will give him 2 mg ativan PO x 1 dose  ·	BP is still high. Cont Amlodipine 10 mg po daily. Increase Lisinopril to 20 mg po daily, and cont Coreg 3.125 mg po q 12h.   ·	CE x 2 negative.   ·	ECHO showed EF is 64%. Grade II diastolic dysfunction.   ·	US of the spleen noted. Normal size spleen.   ·	Care d/w the Hem/Onc fellow. Pt can be discharged and f/u as an out pt.   ·	Recommended to check EPO level and ASHU-2  ·	Hep B is negative.   ·	S/P Phlebotomy x 2. Two units of blood taken  ·	Hb is down to 17.7 today  ·	Counselled to quit smoking  ·	Out pt f/u with cardiology    * D/C home. Med rec reviewed. Plan of care d/w the pt. Time spent 41 minutes.     Dylan Moncada MD  Spectra: [id number] 57 yo male w/ PMH of ETOH abuse, smoker, uncontrolled HTN recently placed on amlodipine presents for chest pain and palpitations x 4 days.     Hypertensive Emergency  CP likely due #1  Polycythemia  Thrombocytopenia  ETOH abuse  Tobacco use               PLAN:    ·	No events on tele  ·	C/O headache. CIWA score is 1. Likely due to ETOH withdrawal. Will give him 2 mg ativan PO x 1 dose  ·	BP is still high. Cont Amlodipine 10 mg po daily. Increase Lisinopril to 20 mg po daily, and cont Coreg 3.125 mg po q 12h.   ·	CE x 2 negative.   ·	ECHO showed EF is 64%. Grade II diastolic dysfunction.   ·	US of the spleen noted. Normal size spleen.   ·	Care d/w the Hem/Onc fellow. Pt can be discharged and f/u as an out pt.   ·	Recommended to check EPO level and ASHU-2  ·	Hep B is negative.   ·	S/P Phlebotomy x 2. Two units of blood taken  ·	Hb is down to 17.7 today  ·	Counselled to quit smoking  ·	Out pt f/u with cardiology    * D/C home. Med rec reviewed. Plan of care d/w the pt. Time spent 41 minutes.     Dylan Moncada MD  Spectra: [phone]    ADDENDUM:     < from: TTE Echo Complete w/ Contrast w/ Doppler (02.20.22 @ 10:51) >    Summary:   1. Left ventricular ejection fraction, by visual estimation, is 50 to   55%.   2. Moderate concentric left ventricular hypertrophy.   3. Spectral Doppler shows impaired relaxation pattern of left   ventricular myocardial filling (Grade I diastolic dysfunction).   4. Mild aortic regurgitation.   5. Dilatation of the ascending aorta.   6. Mild aortic regurgitation.   7. Trace mitral valve regurgitation.    < end of copied text >    ECHO reviewed. Will d/c him home. F/U with cardiology as an out pt.

## 2022-02-20 NOTE — DISCHARGE NOTE PROVIDER - NSDCMRMEDTOKEN_GEN_ALL_CORE_FT
amLODIPine 10 mg oral tablet: 1 tab(s) orally once a day  carvedilol 3.125 mg oral tablet: 1 tab(s) orally every 12 hours  folic acid 1 mg oral tablet: 1 tab(s) orally once a day  lisinopril 20 mg oral tablet: 1 tab(s) orally once a day  thiamine 100 mg oral tablet: 1 tab(s) orally once a day

## 2022-02-20 NOTE — PROGRESS NOTE ADULT - SUBJECTIVE AND OBJECTIVE BOX
YOHANNES SNYDER  56y Male    CHIEF COMPLAINT:    Patient is a 56y old  Male who presents with a chief complaint of uncontrolled bp / chest 'thumps' / headache. (2022 21:40)      INTERVAL HPI/OVERNIGHT EVENTS:    Patient seen and examined. C/O headache. No cp or sob. BP still runs high.    ROS: All other systems are negative.    Vital Signs:    T(F): 97.6 (22 @ 06:16), Max: 98.6 (22 @ 13:58)  HR: 74 (22 @ 06:16) (71 - 77)  BP: 160/89 (22 @ 06:16) (141/76 - 171/89)  RR: 20 (22 @ 08:18) (18 - 20)  SpO2: 98% (22 @ 08:18) (98% - 98%)  I&O's Summary    2022 07:01  -  2022 07:00  --------------------------------------------------------  IN: 984 mL / OUT: 350 mL / NET: 634 mL    2022 07:01  -  2022 09:02  --------------------------------------------------------  IN: 328 mL / OUT: 350 mL / NET: -22 mL      Daily     Daily Weight in k (2022 06:16)  CAPILLARY BLOOD GLUCOSE          PHYSICAL EXAM:    GENERAL:  NAD  SKIN: No rashes or lesions  HENT: Atraumatic. Normocephalic. PERRL. Moist membranes.  NECK: Supple, No JVD. No lymphadenopathy.  PULMONARY: CTA B/L. No wheezing. No rales  CVS: Normal S1, S2. Rate and Rhythm are regular. No murmurs.  ABDOMEN/GI: Soft, Nontender, Nondistended; BS present  EXTREMITIES: Peripheral pulses intact. No edema B/L LE.  NEUROLOGIC:  No motor or sensory deficit.  PSYCH: Alert & oriented x 3    Consultant(s) Notes Reviewed:  [x ] YES  [ ] NO  Care Discussed with Consultants/Other Providers [ x] YES  [ ] NO    EKG reviewed  Telemetry reviewed    LABS:                        17.7   6.27  )-----------( 32       ( 2022 04:30 )             50.5     02-    138  |  101  |  10  ----------------------------<  111<H>  3.7   |  24  |  0.8    Ca    8.9      2022 04:30        Serum Pro-Brain Natriuretic Peptide: 259 pg/mL (22 @ 18:11)    Trop <0.01, CKMB --, CK --, 22 @ 18:11        RADIOLOGY & ADDITIONAL TESTS:    < from: US Spleen (22 @ 11:18) >    IMPRESSION:  Spleen measures up to 12.1 cm in length.    < end of copied text >  < from: CT Head No Cont (22 @ 03:09) >    IMPRESSION:  No acute intracranial pathology. Noevidence of midline shift, mass   effect or intracranial hemorrhage.    Mild chronic microvascular type changes as well as a chronic cortical   appearing lacunar infarct involving the right lentiform nucleus.    < end of copied text >  < from: TTE Echo Complete w/o Contrast w/ Doppler (21 @ 10:08) >    Summary:   1. LV Ejection Fraction by Cross's Method with a biplane EF of 64 %.   2. Normal global left ventricular systolic function.   3. Moderate concentric left ventricular hypertrophy.   4. Spectral Doppler shows pseudonormal pattern of left ventricular myocardial filling (Grade II diastolic dysfunction).   5. Mild aortic regurgitation.   6. Dilatation of the ascending aorta (4.2 cm).    < end of copied text >    Imaging or report Personally Reviewed:  [x ] YES  [ ] NO    Medications:  Standing  amLODIPine   Tablet 10 milliGRAM(s) Oral daily  carvedilol 3.125 milliGRAM(s) Oral every 12 hours  enoxaparin Injectable 40 milliGRAM(s) SubCutaneous daily  folic acid 1 milliGRAM(s) Oral daily  lisinopril 10 milliGRAM(s) Oral at bedtime  thiamine 100 milliGRAM(s) Oral daily    PRN Meds      Case discussed with resident    Care discussed with pt/family        
YOHANNES SNYDER   [id number]  56y   Male     SUBJECTIVE:  Patient is a 56y old Male who presents with a chief complaint of uncontrolled bp / chest 'thumps' / headache. (17 Feb 2022 21:40)    Today is hospital day 1d. This morning he is resting comfortably in bed and reports no new issues or overnight events.   Currently admitted to medicine with the primary diagnosis of Chest pain       PAST MEDICAL & SURGICAL HISTORY  HTN (hypertension)      FAMILY HISTORY:  Family history of atherosclerosis (Sibling)      SOCIAL HISTORY:  Marital Status:  Living Situation:  Occupation:  Tobacco Use: 80PY Hx  Alcohol Use: Frequent  Drug Use: Denies  Sexual History:    ALLERGIES:  No Known Allergies    MEDICATIONS:  STANDING MEDICATIONS  amLODIPine   Tablet 10 milliGRAM(s) Oral daily  enoxaparin Injectable 40 milliGRAM(s) SubCutaneous daily  folic acid 1 milliGRAM(s) Oral daily  lisinopril 10 milliGRAM(s) Oral at bedtime  thiamine 100 milliGRAM(s) Oral daily    PRN MEDICATIONS    VITALS:   T(F): 98.4 (02-18-22 @ 05:48)  HR: 82 (02-18-22 @ 05:48)  BP: 169/87 (02-18-22 @ 05:48)  BP(mean): --  RR: 18 (02-18-22 @ 05:48)  SpO2: 98% (02-18-22 @ 05:48)    LABS:                        19.5   6.88  )-----------( 28       ( 17 Feb 2022 18:11 )             54.8     Hemoglobin: 19.5 g/dL (02-17 @ 18:11)  Hemoglobin: 18.5 g/dL (02-15 @ 22:07)    02-17    134<L>  |  97<L>  |  8<L>  ----------------------------<  100<H>  4.5   |  23  |  1.0    Ca    8.8      17 Feb 2022 18:11  Mg     2.0     02-17    TPro  6.3  /  Alb  3.7  /  TBili  1.1  /  DBili  0.4<H>  /  AST  88<H>  /  ALT  78<H>  /  AlkPhos  85  02-18    Potassium Trend: 4.5<--, 3.2<--  SODIUM TREND:  Sodium 134 [02-17 @ 18:11]  Sodium 135 [02-15 @ 22:07]    Creatinine Trend: 1.0<--, 0.8<--  PT/INR - ( 18 Feb 2022 05:50 )   PT: 11.90 sec;   INR: 1.04 ratio         PTT - ( 18 Feb 2022 05:50 )  PTT:38.2 sec      Calcium, Total Serum: 8.8 mg/dL (02-17-22 @ 18:11)  Troponin T, Serum: <0.01 ng/mL (02-17-22 @ 18:11)  Magnesium, Serum: 2.0 mg/dL (02-17-22 @ 18:11)      CARDIAC MARKERS ( 17 Feb 2022 18:11 )  x     / <0.01 ng/mL / x     / x     / x          COVID  02-17-22 @ 17:31  COVID -   [facility]  02-07-21 @ 15:02  COVID -   [facility]      RADIOLOGY:  < from: CT Head No Cont (02.18.22 @ 03:09) >  IMPRESSION:  No acute intracranial pathology. Noevidence of midline shift, mass   effect or intracranial hemorrhage.    Mild chronic microvascular type changes as well as a chronic cortical   appearing lacunar infarct involving the right lentiform nucleus.    --- End of Report ---    < end of copied text >  < from: Xray Chest 2 Views PA/Lat (02.17.22 @ 19:17) >    Impression:    No evidence of acute cardiopulmonary disease.    --- End of Report ---    < end of copied text >  < from: TTE Echo Complete w/o Contrast w/ Doppler (02.08.21 @ 10:08) >  Summary:   1. LV Ejection Fraction by Cross's Method with a biplane EF of 64 %.   2. Normal global left ventricular systolic function.   3. Moderate concentric left ventricular hypertrophy.   4. Spectral Doppler shows pseudonormal pattern of left ventricular myocardial filling (Grade II diastolic dysfunction).   5. Mild aortic regurgitation.   6. Dilatation of the ascending aorta (4.2 cm).    < end of copied text >  < from: CT Heart Calcium Score (02.08.21 @ 12:23) >  IMPRESSION:    The total Agatston coronary artery calcium score equals 706, which corresponds to 99th percentile for age, gender and ethnicity.    Ectatic ascending aorta measuring approximately 4.4 cm at the level of the main pulmonary artery.    < end of copied text >    PHYSICAL EXAM:  GEN: No acute distress  HEENT: normocephalic, atraumatic, aniceteric  LUNGS: Clear to auscultation bilaterally, no rales/wheezing/ rhonchi  HEART: S1/S2 present. RRR, no murmurs  ABD: Soft, non-tender, non-distended. Bowel sounds present  EXT: Warm, well perfused, skin color appropriate for ethnicity  NEURO: AAOX3, normal affect    
YOHANNES SNYDER 56y Male  MRN#: [id number]   CODE STATUS: Full code    Hospital Day: 2d    Pt is currently admitted with the primary diagnosis of     SUBJECTIVE  Hospital Course    Overnight events     Subjective complaints     Present Today:   - Milagro:  No [  ], Yes [   ] : Indication:     - Type of IV Access:       .. CVC/Piccline:  No [  ], Yes [   ] : Indication:       .. Midline: No [  ], Yes [   ] : Indication:                                             ----------------------------------------------------------  OBJECTIVE  PAST MEDICAL & SURGICAL HISTORY  HTN (hypertension)                                              -----------------------------------------------------------  ALLERGIES:  No Known Allergies                                            ------------------------------------------------------------    HOME MEDICATIONS  Home Medications:                           MEDICATIONS:  STANDING MEDICATIONS  acetaminophen     Tablet .. 650 milliGRAM(s) Oral once  amLODIPine   Tablet 10 milliGRAM(s) Oral daily  carvedilol 3.125 milliGRAM(s) Oral every 12 hours  enoxaparin Injectable 40 milliGRAM(s) SubCutaneous daily  folic acid 1 milliGRAM(s) Oral daily  lisinopril 10 milliGRAM(s) Oral at bedtime  thiamine 100 milliGRAM(s) Oral daily    PRN MEDICATIONS                                            ------------------------------------------------------------  VITAL SIGNS: Last 24 Hours  T(C): 36.4 (19 Feb 2022 04:53), Max: 36.6 (18 Feb 2022 16:03)  T(F): 97.6 (19 Feb 2022 04:53), Max: 97.9 (18 Feb 2022 16:03)  HR: 80 (19 Feb 2022 06:22) (76 - 81)  BP: 172/79 (19 Feb 2022 06:22) (158/80 - 176/85)  BP(mean): --  RR: 20 (19 Feb 2022 08:21) (18 - 20)  SpO2: 98% (19 Feb 2022 08:21) (98% - 99%)      02-18-22 @ 07:01  -  02-19-22 @ 07:00  --------------------------------------------------------  IN: 120 mL / OUT: 0 mL / NET: 120 mL    02-19-22 @ 07:01  -  02-19-22 @ 11:12  --------------------------------------------------------  IN: 328 mL / OUT: 0 mL / NET: 328 mL                                             --------------------------------------------------------------  LABS:                        18.9   6.63  )-----------( 34       ( 19 Feb 2022 04:30 )             55.0     02-19    138  |  101  |  10  ----------------------------<  111<H>  3.7   |  24  |  0.8    Ca    8.9      19 Feb 2022 04:30  Mg     2.0     02-17    TPro  6.3  /  Alb  3.7  /  TBili  1.1  /  DBili  0.4<H>  /  AST  88<H>  /  ALT  78<H>  /  AlkPhos  85  02-18    PT/INR - ( 18 Feb 2022 05:50 )   PT: 11.90 sec;   INR: 1.04 ratio         PTT - ( 18 Feb 2022 05:50 )  PTT:38.2 sec              CARDIAC MARKERS ( 17 Feb 2022 18:11 )  x     / <0.01 ng/mL / x     / x     / x                                                  -------------------------------------------------------------  RADIOLOGY:                                            --------------------------------------------------------------    PHYSICAL EXAM:  General:   HEENT:  LUNGS:  HEART:  ABDOMEN:  EXT:  NEURO:  SKIN:                                           --------------------------------------------------------------    ASSESSMENT & PLAN    57 yo male w/ PMH of ETOH abuse, smoker, uncontrolled HTN recently placed on amlodipine presents for chest pain and palpitations x 4 days.  Was here 2 days ago for same complaint, was DCed with amlodipine RX, still having pain and palpitations so returned.  Left sided pinching pain, radiates to retrosternal area and occasionally L arm, no alleviating/provoking factors, denies having had before.  Denies SOB, leg pain, leg swelling, hx of blood clots, hemoptysis, recent travel/surgery/immobilization, URI sxs.  Brother had "small MI" in 40s. Has not seen a cardiologist.    # Hypertensive urgency from medication sub-optimization.   # chest pain - resolved   - trops (-) x2, CXR (-)   - Increase home amlodipine to 10mg in the AM + ace i in the PM  - cardio consulted  - No acute intracranial pathology. Noevidence of midline shift, mass   effect or intracranial hemorrhage.    Mild chronic microvascular type changes as well as a chronic cortical   appearing lacunar infarct involving the right lentiform nucleus.      # Reactive Polycythemia with chronic thrombocytopenia   - hgb baseline 18.5-19.5, platelets 28 (confirmed on manual count)  - denies any bleeding causes, pending peripheral smear  - had phlebotomy round done on 2/18 and will have one today    #EtOH and smoking  # suspected thiamine def  -Etoh & tobacco cessation counseling provided  - Huber protocal   - start thiamin/folate    # Transaminitis   - likely related to recent binge drinking  - Hep panel negative  - trending down      # Suspected EMILIANO  - o/p pulm sleep test    # DVT ppx- LMWH  # Activity- AAT  # Diet- Dash/tlc  # Code status - full  # Dispo- from home                     
  YOHANNES SNYDER  56y Male    CHIEF COMPLAINT:    Patient is a 56y old  Male who presents with a chief complaint of uncontrolled bp / chest 'thumps' / headache. (2022 21:40)      INTERVAL HPI/OVERNIGHT EVENTS:    Patient seen and examined. C/O intermittent dizziness and headache. No cp today. No sob. BP running high    ROS: All other systems are negative.    Vital Signs:    T(F): 97.6 (22 @ 04:53), Max: 97.9 (22 @ 16:03)  HR: 80 (22 @ 06:22) (76 - 81)  BP: 172/79 (22 @ 06:22) (158/80 - 176/85)  RR: 20 (22 @ 08:21) (18 - 20)  SpO2: 98% (22 @ 08:21) (98% - 99%)  I&O's Summary    2022 07:01  -  2022 07:00  --------------------------------------------------------  IN: 120 mL / OUT: 0 mL / NET: 120 mL    2022 07:01  -  2022 12:41  --------------------------------------------------------  IN: 744 mL / OUT: 0 mL / NET: 744 mL      Daily Height in cm: 177.8 (2022 20:00)    Daily Weight in k.1 (2022 04:53)  CAPILLARY BLOOD GLUCOSE          PHYSICAL EXAM:    GENERAL:  NAD  SKIN: No rashes or lesions  HENT: Atraumatic. Normocephalic. PERRL. Moist membranes.  NECK: Supple, No JVD. No lymphadenopathy.  PULMONARY: CTA B/L. No wheezing. No rales  CVS: Normal S1, S2. Rate and Rhythm are regular. No murmurs.  ABDOMEN/GI: Soft, Nontender, Nondistended; BS present  EXTREMITIES: Peripheral pulses intact. No edema B/L LE.  NEUROLOGIC:  No motor or sensory deficit.  PSYCH: Alert & oriented x 3    Consultant(s) Notes Reviewed:  [x ] YES  [ ] NO  Care Discussed with Consultants/Other Providers [ x] YES  [ ] NO    EKG reviewed  Telemetry reviewed    LABS:                        18.9   6.63  )-----------( 34       ( 2022 04:30 )             55.0   Hemoglobin: 18.9 g/dL ( @ 04:30)  Hemoglobin: 19.5 g/dL ( @ 18:11)  Hemoglobin: 18.5 g/dL (02-15 @ 22:07)        138  |  101  |  10  ----------------------------<  111<H>  3.7   |  24  |  0.8    Ca    8.9      2022 04:30  Mg     2.0         TPro  6.3  /  Alb  3.7  /  TBili  1.1  /  DBili  0.4<H>  /  AST  88<H>  /  ALT  78<H>  /  AlkPhos  85      PT/INR - ( 2022 05:50 )   PT: 11.90 sec;   INR: 1.04 ratio         PTT - ( 2022 05:50 )  PTT:38.2 sec  Serum Pro-Brain Natriuretic Peptide: 259 pg/mL (22 @ 18:11)    Trop <0.01, CKMB --, CK --, 22 @ 18:11        RADIOLOGY & ADDITIONAL TESTS:      Imaging or report Personally Reviewed:  [ ] YES  [ ] NO    Medications:  Standing  amLODIPine   Tablet 10 milliGRAM(s) Oral daily  carvedilol 3.125 milliGRAM(s) Oral every 12 hours  enoxaparin Injectable 40 milliGRAM(s) SubCutaneous daily  folic acid 1 milliGRAM(s) Oral daily  lisinopril 10 milliGRAM(s) Oral at bedtime  thiamine 100 milliGRAM(s) Oral daily    PRN Meds      Case discussed with resident    Care discussed with pt/family

## 2022-02-22 LAB — EPO SERPL-MCNC: 4.6 MIU/ML — SIGNIFICANT CHANGE UP (ref 2.6–18.5)

## 2022-02-24 DIAGNOSIS — I16.0 HYPERTENSIVE URGENCY: ICD-10-CM

## 2022-02-24 DIAGNOSIS — R00.2 PALPITATIONS: ICD-10-CM

## 2022-02-24 DIAGNOSIS — R74.01 ELEVATION OF LEVELS OF LIVER TRANSAMINASE LEVELS: ICD-10-CM

## 2022-02-24 DIAGNOSIS — R07.9 CHEST PAIN, UNSPECIFIED: ICD-10-CM

## 2022-02-24 DIAGNOSIS — Z86.79 PERSONAL HISTORY OF OTHER DISEASES OF THE CIRCULATORY SYSTEM: ICD-10-CM

## 2022-02-24 DIAGNOSIS — E51.9 THIAMINE DEFICIENCY, UNSPECIFIED: ICD-10-CM
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
## 2022-02-24 DIAGNOSIS — G44.89 OTHER HEADACHE SYNDROME: ICD-10-CM

## 2022-02-24 DIAGNOSIS — D69.6 THROMBOCYTOPENIA, UNSPECIFIED: ICD-10-CM

## 2022-02-24 DIAGNOSIS — E53.8 DEFICIENCY OF OTHER SPECIFIED B GROUP VITAMINS: ICD-10-CM

## 2022-02-24 DIAGNOSIS — D75.1 SECONDARY POLYCYTHEMIA: ICD-10-CM

## 2022-02-24 DIAGNOSIS — F10.239 ALCOHOL DEPENDENCE WITH WITHDRAWAL, UNSPECIFIED: ICD-10-CM

## 2022-02-24 DIAGNOSIS — I25.10 ATHEROSCLEROTIC HEART DISEASE OF NATIVE CORONARY ARTERY WITHOUT ANGINA PECTORIS: ICD-10-CM

## 2022-02-24 DIAGNOSIS — F17.200 NICOTINE DEPENDENCE, UNSPECIFIED, UNCOMPLICATED: ICD-10-CM

## 2022-02-24 DIAGNOSIS — R93.1 ABNORMAL FINDINGS ON DIAGNOSTIC IMAGING OF HEART AND CORONARY CIRCULATION: ICD-10-CM

## 2022-02-24 DIAGNOSIS — Z91.14 PATIENT'S OTHER NONCOMPLIANCE WITH MEDICATION REGIMEN: ICD-10-CM

## 2022-02-24 DIAGNOSIS — R09.02 HYPOXEMIA: ICD-10-CM

## 2022-02-24 DIAGNOSIS — G47.33 OBSTRUCTIVE SLEEP APNEA (ADULT) (PEDIATRIC): ICD-10-CM

## 2022-03-25 ENCOUNTER — OUTPATIENT (OUTPATIENT)
Dept: OUTPATIENT SERVICES | Facility: HOSPITAL | Age: 57
LOS: 1 days | Discharge: HOME | End: 2022-03-25

## 2022-03-25 ENCOUNTER — APPOINTMENT (OUTPATIENT)
Dept: HEMATOLOGY ONCOLOGY | Facility: CLINIC | Age: 57
End: 2022-03-25
Payer: MEDICAID

## 2022-03-25 ENCOUNTER — LABORATORY RESULT (OUTPATIENT)
Age: 57
End: 2022-03-25

## 2022-03-25 VITALS
WEIGHT: 172 LBS | DIASTOLIC BLOOD PRESSURE: 83 MMHG | RESPIRATION RATE: 18 BRPM | HEIGHT: 68.66 IN | BODY MASS INDEX: 25.77 KG/M2 | SYSTOLIC BLOOD PRESSURE: 138 MMHG | HEART RATE: 101 BPM | TEMPERATURE: 97.8 F

## 2022-03-25 DIAGNOSIS — D69.6 THROMBOCYTOPENIA, UNSPECIFIED: ICD-10-CM

## 2022-03-25 DIAGNOSIS — D75.1 SECONDARY POLYCYTHEMIA: ICD-10-CM

## 2022-03-25 DIAGNOSIS — Z63.5 DISRUPTION OF FAMILY BY SEPARATION AND DIVORCE: ICD-10-CM

## 2022-03-25 LAB
HCT VFR BLD CALC: 46 %
HGB BLD-MCNC: 16.3 G/DL
MCHC RBC-ENTMCNC: 32.9 PG
MCHC RBC-ENTMCNC: 35.4 G/DL
MCV RBC AUTO: 92.7 FL
PLATELET # BLD AUTO: 74 K/UL
PMV BLD: 11 FL
RBC # BLD: 4.96 M/UL
RBC # FLD: 12.7 %
WBC # FLD AUTO: 6.5 K/UL

## 2022-03-25 PROCEDURE — 99204 OFFICE O/P NEW MOD 45 MIN: CPT

## 2022-03-25 SDOH — SOCIAL STABILITY - SOCIAL INSECURITY: DISRUPTION OF FAMILY BY SEPARATION AND DIVORCE: Z63.5

## 2022-03-25 NOTE — CONSULT LETTER
[Dear  ___] : Dear  [unfilled], [Consult Letter:] : I had the pleasure of evaluating your patient, [unfilled]. [Please see my note below.] : Please see my note below. [Consult Closing:] : Thank you very much for allowing me to participate in the care of this patient.  If you have any questions, please do not hesitate to contact me. [Sincerely,] : Sincerely, [FreeTextEntry3] : Dr. ALEXA Karimi

## 2022-03-25 NOTE — ASSESSMENT
[FreeTextEntry1] : 1. Thrombocytopenia of unclear etiology at this point. This could have been from his heavy alcohol intake. He has discontinued any alcohol since 2 months ago. No bleeding or bruising.\par 2. Erythrocytosis. Also of unknown etiology right now.\par \par Before further extensive work up, will obtain CBC. If the above problems still persisting, will proceed with further evaluation for polycythemia and thrombocytopenia.\par \par The situation was explained to the patient.\par All questions answered.\par He understood.\par \par Further recommendations after the above CBC completed.

## 2022-03-25 NOTE — PHYSICAL EXAM
[Fully active, able to carry on all pre-disease performance without restriction] : Status 0 - Fully active, able to carry on all pre-disease performance without restriction [Normal] : affect appropriate [de-identified] : But somewhat overweight

## 2022-03-25 NOTE — HISTORY OF PRESENT ILLNESS
[Disease:__________________________] : Disease: [unfilled] [de-identified] : The patient is a 56 year old white male referred by Dr. Koo for evaluation of "low platelet count". \par Apparently, he was noted to have low platelets while he was admitted for high blood pressure (225 systolic) back in February. On his discharge papers, in addition to High Blood Pressure and Thrombocytopenia, Polycythemia was also noted. Since then he has not touched any alcohol.\par No previous history of the above otherwise.\par The patient does not remember having bleeding of bruising before the admission and none in the hospital either.\par He was not on any new medications. \par He was apparently drinking about a pint of Bacardi rum daily.\par About 5 years ago, his CBC was adequate with Hgb about 17.2 and platelets around 160 K.\par

## 2022-03-25 NOTE — REVIEW OF SYSTEMS
[Recent Change In Weight] : ~T recent weight change [Vision Problems] : vision problems [Insomnia] : insomnia [Negative] : Heme/Lymph [FreeTextEntry2] : Lost about 15 lbs by dieting (was 236 lbs). [FreeTextEntry3] : Occasional blurriness [de-identified] : ?Sleep apnea?

## 2022-03-28 DIAGNOSIS — D75.1 SECONDARY POLYCYTHEMIA: ICD-10-CM

## 2022-03-28 DIAGNOSIS — D69.6 THROMBOCYTOPENIA, UNSPECIFIED: ICD-10-CM

## 2022-04-13 ENCOUNTER — APPOINTMENT (OUTPATIENT)
Dept: HEMATOLOGY ONCOLOGY | Facility: CLINIC | Age: 57
End: 2022-04-13

## 2022-11-19 NOTE — PATIENT PROFILE ADULT - HAS THE PATIENT RECEIVED THE INFLUENZA VACCINE THIS SEASON?
NEPHROLOGY PROGRESS JORGE A    Subjective: Patient seen and examined at bedside, now intubated and sedated after code in AM.     [OBJECTIVE]:    Vital Signs:  T(F): , Max: 97.8 (22 @ 20:45)  HR:  (42 - 66)  BP:  (56/34 - 187/74)  BP(mean):  (40 - 126)  ABP: --  ABP(mean): --  RR:  (12 - 20)  SpO2:  (92% - 100%)  CVP(mm Hg): --  CVP(cm H2O): --  Mode: AC/ CMV (Assist Control/ Continuous Mandatory Ventilation), RR (machine): 12, RR (patient): 12, TV (machine): 500, FiO2: 100, PEEP: 5, ITime: 1, MAP: 9, PIP: 25     @ 07:01  -   @ 07:00  --------------------------------------------------------  IN: 200 mL / OUT: 850 mL / NET: -650 mL     @ 07:01  -   @ 15:53  --------------------------------------------------------  IN: 337 mL / OUT: 635 mL / NET: -298 mL      CAPILLARY BLOOD GLUCOSE      POCT Blood Glucose.: 181 mg/dL (2022 13:43)    .  VITAL SIGNS:  T(F): 97.8 (22 @ 06:39), Max: 97.8 (22 @ 20:45)  HR: 43 (22 @ 15:43) (42 - 66)  BP: 125/57 (22 @ 15:43) (56/34 - 187/74)  BP(mean): 82 (22 @ 15:43) (40 - 126)  ABP: --  ABP(mean): --  RR: 14 (22 @ 15:43) (12 - 20)  SpO2: 100% (22 @ 15:43) (92% - 100%)    PHYSICAL EXAM:  Constitutional: Intubated, sedated; NAD  HEENT: ETT tube in place, clear secretions  Respiratory: Mechanical breath sounds bilaterally, not overbreathing vent  Cardiac: +S1/S2; RRR; no M/R/G  Gastrointestinal: soft, NT/ND; no rebound or guarding; +BS; RLQ allograft soft nontender without thrill/bruit  Extremities: WWP, no clubbing or cyanosis; no peripheral edema  Dermatologic: skin warm, dry and intact; no rashes, wounds, or scars  Access: RUE wrist AVF +Thrill/bruit    Medications:  MEDICATIONS  (STANDING):  aspirin enteric coated 81 milliGRAM(s) Oral every 24 hours  carvedilol 25 milliGRAM(s) Oral every 12 hours  cefepime   IVPB 2000 milliGRAM(s) IV Intermittent every 24 hours  dextrose 5%. 1000 milliLiter(s) (100 mL/Hr) IV Continuous <Continuous>  dextrose 5%. 1000 milliLiter(s) (50 mL/Hr) IV Continuous <Continuous>  dextrose 50% Injectable 25 Gram(s) IV Push once  dextrose 50% Injectable 12.5 Gram(s) IV Push once  dextrose 50% Injectable 25 Gram(s) IV Push once  furosemide   Injectable 40 milliGRAM(s) IV Push every 12 hours  glucagon  Injectable 1 milliGRAM(s) IntraMuscular once  heparin   Injectable 5000 Unit(s) SubCutaneous every 12 hours  influenza  Vaccine (HIGH DOSE) 0.7 milliLiter(s) IntraMuscular once  insulin lispro (ADMELOG) corrective regimen sliding scale   SubCutaneous Before meals and at bedtime  mycophenolate mofetil 500 milliGRAM(s) Oral every 12 hours  norepinephrine Infusion 0.05 MICROgram(s)/kG/Min (9.36 mL/Hr) IV Continuous <Continuous>  propofol Infusion 10 MICROgram(s)/kG/Min (5.99 mL/Hr) IV Continuous <Continuous>  senna 2 Tablet(s) Oral at bedtime  sodium polystyrene sulfonate Suspension 15 Gram(s) Oral every 24 hours  tamsulosin 0.4 milliGRAM(s) Oral at bedtime  vancomycin  IVPB 1250 milliGRAM(s) IV Intermittent every 24 hours    MEDICATIONS  (PRN):  benzonatate 100 milliGRAM(s) Oral every 8 hours PRN Cough  dextrose Oral Gel 15 Gram(s) Oral once PRN Blood Glucose LESS THAN 70 milliGRAM(s)/deciliter      Allergies:  Allergies    No Known Allergies    Intolerances        Labs:                        8.1    15.10 )-----------( 107      ( 2022 09:15 )             27.8         141  |  110<H>  |  61<H>  ----------------------------<  194<H>  x    |  17<L>  |  2.56<H>    Ca    8.7      2022 14:18  Phos  5.3       Mg     2.2         TPro  5.2<L>  /  Alb  2.9<L>  /  TBili  <0.2  /  DBili  x   /  AST  9<L>  /  ALT  8<L>  /  AlkPhos  99      PT/INR - ( 2022 09:15 )   PT: 12.7 sec;   INR: 1.07          PTT - ( 2022 09:15 )  PTT:39.3 sec  Urinalysis Basic - ( 2022 19:25 )    Color: Yellow / Appearance: Clear / S.020 / pH: x  Gluc: x / Ketone: NEGATIVE  / Bili: Negative / Urobili: 0.2 E.U./dL   Blood: x / Protein: NEGATIVE mg/dL / Nitrite: NEGATIVE   Leuk Esterase: NEGATIVE / RBC: x / WBC x   Sq Epi: x / Non Sq Epi: x / Bacteria: x        Radiology and other tests:  Creatinine, Serum: 2.56 mg/dL (22 @ 14:18)  Creatinine, Serum: 2.43 mg/dL (22 @ 09:15)  Creatinine, Serum: 2.44 mg/dL (22 @ 05:30)  Creatinine, Serum: 2.44 mg/dL (22 @ 05:30)  Creatinine, Serum: 2.42 mg/dL (22 @ 14:09)  Creatinine, Serum: 2.83 mg/dL (10-25-22 @ 05:30)  Creatinine, Serum: 2.81 mg/dL (10-24-22 @ 05:30)  Creatinine, Serum: 2.69 mg/dL (10-23-22 @ 05:30)  Creatinine, Serum: 2.50 mg/dL (10-22-22 @ 05:30)  Creatinine, Serum: 2.38 mg/dL (10-21-22 @ 12:55)      Tacrolimus (), Serum: 8.5  Tacrolimus (), Serum: 5.9:Tacrolimus (), Serum: 3.8:  yes...

## 2023-05-11 NOTE — ED ADULT TRIAGE NOTE - ESI TRIAGE ACUITY LEVEL, MLM
Pt was admitted to the hospital. He was then taken to the OR where he underwent a radical robotic assisted prostatectomy with b/l pelvic node dissection. The pt did well and tolerated the procedure well. He went to RR in stable condition. Post op labs were stable. The pt was then transferred to the floor. He continued to do well, his labs remained stable. The EFRAIN was removed and the pt was d/c'd home in stable condition. 3

## 2024-03-20 NOTE — ED PROVIDER NOTE - CARE PLAN
Airway  Date/Time: 3/20/2024 12:42 PM  Urgency: elective      General Information and Staff    Patient location during procedure: OR  Anesthesiologist: Da Peng MD  Performed: anesthesiologist   Performed by: Da Peng MD  Authorized by: Da Peng MD      Indications and Patient Condition  Indications for airway management: anesthesia  Sedation level: deep  Preoxygenated: yes  Patient position: sniffing  Mask difficulty assessment: 1 - vent by mask    Final Airway Details  Final airway type: endotracheal airway      Successful airway: ETT  Cuffed: yes   Successful intubation technique: direct laryngoscopy  Endotracheal tube insertion site: oral  Blade: Nicolas  Blade size: #3  ETT size (mm): 7.5    Cormack-Lehane Classification: grade I - full view of glottis  Placement verified by: capnometry   Measured from: lips  ETT to lips (cm): 21  Number of attempts at approach: 1    Additional Comments   OETT placed without airway or dental trauma.           1 Principal Discharge DX:	Chest pain  Secondary Diagnosis:	Thrombocytopenia  Secondary Diagnosis:	Uncontrolled hypertension

## 2024-07-26 NOTE — DISCHARGE NOTE PROVIDER - HOSPITAL COURSE
55 yo male w/ PMH of ETOH abuse, smoker, uncontrolled HTN recently placed on amlodipine presents for chest pain and palpitations x 4 days. Pt's BP was high and was admitted for Hypertensive urgency. He was started on Coreg, Lisinopril and Amlodipine. Doses were adjusted to control the BP. Pt also had high Hb and Low platelet count on admission. Hem/Onc consult was called. They recommended for phlebotomy. Two units of blood were taken out and prior to d/c his Hb was 17.7. Pt also has h/o ETOH and Tobacco use. He was counselled to stop drinking and smoking. Hem/Onc also recommended to do splenic US and check EPO level and ASHU-2. Spleen size was normal. Blood test reports are pending.   Pt was also seen by cardiology. They recommended out pt f/u for further cardiac w/u. Pt will be discharged home today.    Admission

## 2024-08-22 ENCOUNTER — INPATIENT (INPATIENT)
Facility: HOSPITAL | Age: 59
LOS: 2 days | Discharge: HOME | DRG: 661 | End: 2024-08-25
Attending: INTERNAL MEDICINE | Admitting: STUDENT IN AN ORGANIZED HEALTH CARE EDUCATION/TRAINING PROGRAM
Payer: COMMERCIAL

## 2024-08-22 VITALS
OXYGEN SATURATION: 99 % | RESPIRATION RATE: 18 BRPM | WEIGHT: 175.05 LBS | DIASTOLIC BLOOD PRESSURE: 93 MMHG | HEART RATE: 113 BPM | SYSTOLIC BLOOD PRESSURE: 149 MMHG | TEMPERATURE: 98 F

## 2024-08-22 DIAGNOSIS — F10.939 ALCOHOL USE, UNSPECIFIED WITH WITHDRAWAL, UNSPECIFIED: ICD-10-CM

## 2024-08-22 LAB
ABO RH CONFIRMATION: SIGNIFICANT CHANGE UP
ALBUMIN SERPL ELPH-MCNC: 3.7 G/DL — SIGNIFICANT CHANGE UP (ref 3.5–5.2)
ALBUMIN SERPL ELPH-MCNC: 4.1 G/DL — SIGNIFICANT CHANGE UP (ref 3.5–5.2)
ALP SERPL-CCNC: 102 U/L — SIGNIFICANT CHANGE UP (ref 30–115)
ALP SERPL-CCNC: 119 U/L — HIGH (ref 30–115)
ALT FLD-CCNC: 100 U/L — HIGH (ref 0–41)
ALT FLD-CCNC: 91 U/L — HIGH (ref 0–41)
ANION GAP SERPL CALC-SCNC: 15 MMOL/L — HIGH (ref 7–14)
ANION GAP SERPL CALC-SCNC: 16 MMOL/L — HIGH (ref 7–14)
APPEARANCE UR: CLEAR — SIGNIFICANT CHANGE UP
APTT BLD: 29.7 SEC — SIGNIFICANT CHANGE UP (ref 27–39.2)
AST SERPL-CCNC: 154 U/L — HIGH (ref 0–41)
AST SERPL-CCNC: 173 U/L — HIGH (ref 0–41)
BASOPHILS # BLD AUTO: 0.02 K/UL — SIGNIFICANT CHANGE UP (ref 0–0.2)
BASOPHILS # BLD AUTO: 0.03 K/UL — SIGNIFICANT CHANGE UP (ref 0–0.2)
BASOPHILS # BLD AUTO: 0.03 K/UL — SIGNIFICANT CHANGE UP (ref 0–0.2)
BASOPHILS NFR BLD AUTO: 0.2 % — SIGNIFICANT CHANGE UP (ref 0–1)
BASOPHILS NFR BLD AUTO: 0.4 % — SIGNIFICANT CHANGE UP (ref 0–1)
BASOPHILS NFR BLD AUTO: 0.5 % — SIGNIFICANT CHANGE UP (ref 0–1)
BILIRUB DIRECT SERPL-MCNC: 0.7 MG/DL — HIGH (ref 0–0.3)
BILIRUB INDIRECT FLD-MCNC: 1.7 MG/DL — HIGH (ref 0.2–1.2)
BILIRUB SERPL-MCNC: 2 MG/DL — HIGH (ref 0.2–1.2)
BILIRUB SERPL-MCNC: 2.4 MG/DL — HIGH (ref 0.2–1.2)
BILIRUB UR-MCNC: NEGATIVE — SIGNIFICANT CHANGE UP
BUN SERPL-MCNC: 9 MG/DL — LOW (ref 10–20)
BUN SERPL-MCNC: 9 MG/DL — LOW (ref 10–20)
CALCIUM SERPL-MCNC: 8.3 MG/DL — LOW (ref 8.4–10.4)
CALCIUM SERPL-MCNC: 8.6 MG/DL — SIGNIFICANT CHANGE UP (ref 8.4–10.5)
CHLORIDE SERPL-SCNC: 96 MMOL/L — LOW (ref 98–110)
CHLORIDE SERPL-SCNC: 98 MMOL/L — SIGNIFICANT CHANGE UP (ref 98–110)
CO2 SERPL-SCNC: 24 MMOL/L — SIGNIFICANT CHANGE UP (ref 17–32)
CO2 SERPL-SCNC: 29 MMOL/L — SIGNIFICANT CHANGE UP (ref 17–32)
COLOR SPEC: YELLOW — SIGNIFICANT CHANGE UP
CREAT SERPL-MCNC: 0.9 MG/DL — SIGNIFICANT CHANGE UP (ref 0.7–1.5)
CREAT SERPL-MCNC: 1 MG/DL — SIGNIFICANT CHANGE UP (ref 0.7–1.5)
DIFF PNL FLD: NEGATIVE — SIGNIFICANT CHANGE UP
EGFR: 86 ML/MIN/1.73M2 — SIGNIFICANT CHANGE UP
EGFR: 97 ML/MIN/1.73M2 — SIGNIFICANT CHANGE UP
EOSINOPHIL # BLD AUTO: 0.01 K/UL — SIGNIFICANT CHANGE UP (ref 0–0.7)
EOSINOPHIL # BLD AUTO: 0.01 K/UL — SIGNIFICANT CHANGE UP (ref 0–0.7)
EOSINOPHIL # BLD AUTO: 0.04 K/UL — SIGNIFICANT CHANGE UP (ref 0–0.7)
EOSINOPHIL NFR BLD AUTO: 0.1 % — SIGNIFICANT CHANGE UP (ref 0–8)
EOSINOPHIL NFR BLD AUTO: 0.2 % — SIGNIFICANT CHANGE UP (ref 0–8)
EOSINOPHIL NFR BLD AUTO: 0.5 % — SIGNIFICANT CHANGE UP (ref 0–8)
GLUCOSE SERPL-MCNC: 135 MG/DL — HIGH (ref 70–99)
GLUCOSE SERPL-MCNC: 151 MG/DL — HIGH (ref 70–99)
GLUCOSE UR QL: NEGATIVE MG/DL — SIGNIFICANT CHANGE UP
HCT VFR BLD CALC: 49.3 % — SIGNIFICANT CHANGE UP (ref 42–52)
HCT VFR BLD CALC: 49.7 % — SIGNIFICANT CHANGE UP (ref 42–52)
HCT VFR BLD CALC: 50.4 % — SIGNIFICANT CHANGE UP (ref 42–52)
HGB BLD-MCNC: 17.1 G/DL — SIGNIFICANT CHANGE UP (ref 14–18)
HGB BLD-MCNC: 17.3 G/DL — SIGNIFICANT CHANGE UP (ref 14–18)
HGB BLD-MCNC: 17.8 G/DL — SIGNIFICANT CHANGE UP (ref 14–18)
IMM GRANULOCYTES NFR BLD AUTO: 0.3 % — SIGNIFICANT CHANGE UP (ref 0.1–0.3)
IMM GRANULOCYTES NFR BLD AUTO: 0.3 % — SIGNIFICANT CHANGE UP (ref 0.1–0.3)
IMM GRANULOCYTES NFR BLD AUTO: 0.4 % — HIGH (ref 0.1–0.3)
INR BLD: 1.15 RATIO — SIGNIFICANT CHANGE UP (ref 0.65–1.3)
KETONES UR-MCNC: 15 MG/DL
LEUKOCYTE ESTERASE UR-ACNC: NEGATIVE — SIGNIFICANT CHANGE UP
LYMPHOCYTES # BLD AUTO: 0.6 K/UL — LOW (ref 1.2–3.4)
LYMPHOCYTES # BLD AUTO: 0.8 K/UL — LOW (ref 1.2–3.4)
LYMPHOCYTES # BLD AUTO: 12 % — LOW (ref 20.5–51.1)
LYMPHOCYTES # BLD AUTO: 2.69 K/UL — SIGNIFICANT CHANGE UP (ref 1.2–3.4)
LYMPHOCYTES # BLD AUTO: 32.6 % — SIGNIFICANT CHANGE UP (ref 20.5–51.1)
LYMPHOCYTES # BLD AUTO: 9.6 % — LOW (ref 20.5–51.1)
MAGNESIUM SERPL-MCNC: 1.7 MG/DL — LOW (ref 1.8–2.4)
MCHC RBC-ENTMCNC: 33.9 PG — HIGH (ref 27–31)
MCHC RBC-ENTMCNC: 34.2 PG — HIGH (ref 27–31)
MCHC RBC-ENTMCNC: 34.3 PG — HIGH (ref 27–31)
MCHC RBC-ENTMCNC: 34.4 G/DL — SIGNIFICANT CHANGE UP (ref 32–37)
MCHC RBC-ENTMCNC: 35.1 G/DL — SIGNIFICANT CHANGE UP (ref 32–37)
MCHC RBC-ENTMCNC: 35.3 G/DL — SIGNIFICANT CHANGE UP (ref 32–37)
MCV RBC AUTO: 96.7 FL — HIGH (ref 80–94)
MCV RBC AUTO: 97.8 FL — HIGH (ref 80–94)
MCV RBC AUTO: 98.4 FL — HIGH (ref 80–94)
MONOCYTES # BLD AUTO: 0.71 K/UL — HIGH (ref 0.1–0.6)
MONOCYTES # BLD AUTO: 0.86 K/UL — HIGH (ref 0.1–0.6)
MONOCYTES # BLD AUTO: 0.95 K/UL — HIGH (ref 0.1–0.6)
MONOCYTES NFR BLD AUTO: 11.3 % — HIGH (ref 1.7–9.3)
MONOCYTES NFR BLD AUTO: 11.5 % — HIGH (ref 1.7–9.3)
MONOCYTES NFR BLD AUTO: 12.9 % — HIGH (ref 1.7–9.3)
NEUTROPHILS # BLD AUTO: 4.51 K/UL — SIGNIFICANT CHANGE UP (ref 1.4–6.5)
NEUTROPHILS # BLD AUTO: 4.9 K/UL — SIGNIFICANT CHANGE UP (ref 1.4–6.5)
NEUTROPHILS # BLD AUTO: 4.95 K/UL — SIGNIFICANT CHANGE UP (ref 1.4–6.5)
NEUTROPHILS NFR BLD AUTO: 54.8 % — SIGNIFICANT CHANGE UP (ref 42.2–75.2)
NEUTROPHILS NFR BLD AUTO: 74.3 % — SIGNIFICANT CHANGE UP (ref 42.2–75.2)
NEUTROPHILS NFR BLD AUTO: 78.1 % — HIGH (ref 42.2–75.2)
NITRITE UR-MCNC: NEGATIVE — SIGNIFICANT CHANGE UP
NRBC # BLD: 0 /100 WBCS — SIGNIFICANT CHANGE UP (ref 0–0)
NT-PROBNP SERPL-SCNC: 164 PG/ML — SIGNIFICANT CHANGE UP (ref 0–300)
PH UR: 7 — SIGNIFICANT CHANGE UP (ref 5–8)
PHOSPHATE SERPL-MCNC: 2.5 MG/DL — SIGNIFICANT CHANGE UP (ref 2.1–4.9)
PLATELET # BLD AUTO: 15 K/UL — CRITICAL LOW (ref 130–400)
PLATELET # BLD AUTO: 8 K/UL — CRITICAL LOW (ref 130–400)
PLATELET # BLD AUTO: 8 K/UL — CRITICAL LOW (ref 130–400)
PMV BLD: 12 FL — HIGH (ref 7.4–10.4)
PMV BLD: 12.2 FL — HIGH (ref 7.4–10.4)
PMV BLD: 13.3 FL — HIGH (ref 7.4–10.4)
POST UNIT NUMBER: SIGNIFICANT CHANGE UP
POTASSIUM SERPL-MCNC: 3.1 MMOL/L — LOW (ref 3.5–5)
POTASSIUM SERPL-MCNC: 3.9 MMOL/L — SIGNIFICANT CHANGE UP (ref 3.5–5)
POTASSIUM SERPL-SCNC: 3.1 MMOL/L — LOW (ref 3.5–5)
POTASSIUM SERPL-SCNC: 3.9 MMOL/L — SIGNIFICANT CHANGE UP (ref 3.5–5)
PROT SERPL-MCNC: 6.2 G/DL — SIGNIFICANT CHANGE UP (ref 6–8)
PROT SERPL-MCNC: 7.1 G/DL — SIGNIFICANT CHANGE UP (ref 6–8)
PROT UR-MCNC: SIGNIFICANT CHANGE UP MG/DL
PROTHROM AB SERPL-ACNC: 13.1 SEC — HIGH (ref 9.95–12.87)
RBC # BLD: 5.04 M/UL — SIGNIFICANT CHANGE UP (ref 4.7–6.1)
RBC # BLD: 5.05 M/UL — SIGNIFICANT CHANGE UP (ref 4.7–6.1)
RBC # BLD: 5.21 M/UL — SIGNIFICANT CHANGE UP (ref 4.7–6.1)
RBC # FLD: 13.2 % — SIGNIFICANT CHANGE UP (ref 11.5–14.5)
RBC # FLD: 13.3 % — SIGNIFICANT CHANGE UP (ref 11.5–14.5)
RBC # FLD: 13.3 % — SIGNIFICANT CHANGE UP (ref 11.5–14.5)
SODIUM SERPL-SCNC: 138 MMOL/L — SIGNIFICANT CHANGE UP (ref 135–146)
SODIUM SERPL-SCNC: 140 MMOL/L — SIGNIFICANT CHANGE UP (ref 135–146)
SP GR SPEC: 1.01 — SIGNIFICANT CHANGE UP (ref 1–1.03)
TROPONIN T, HIGH SENSITIVITY RESULT: <6 NG/L — SIGNIFICANT CHANGE UP (ref 6–21)
UROBILINOGEN FLD QL: 2 MG/DL (ref 0.2–1)
WBC # BLD: 6.27 K/UL — SIGNIFICANT CHANGE UP (ref 4.8–10.8)
WBC # BLD: 6.67 K/UL — SIGNIFICANT CHANGE UP (ref 4.8–10.8)
WBC # BLD: 8.24 K/UL — SIGNIFICANT CHANGE UP (ref 4.8–10.8)
WBC # FLD AUTO: 6.27 K/UL — SIGNIFICANT CHANGE UP (ref 4.8–10.8)
WBC # FLD AUTO: 6.67 K/UL — SIGNIFICANT CHANGE UP (ref 4.8–10.8)
WBC # FLD AUTO: 8.24 K/UL — SIGNIFICANT CHANGE UP (ref 4.8–10.8)

## 2024-08-22 PROCEDURE — 83550 IRON BINDING TEST: CPT

## 2024-08-22 PROCEDURE — 83921 ORGANIC ACID SINGLE QUANT: CPT

## 2024-08-22 PROCEDURE — 81003 URINALYSIS AUTO W/O SCOPE: CPT

## 2024-08-22 PROCEDURE — 80053 COMPREHEN METABOLIC PANEL: CPT

## 2024-08-22 PROCEDURE — 85730 THROMBOPLASTIN TIME PARTIAL: CPT

## 2024-08-22 PROCEDURE — P9037: CPT

## 2024-08-22 PROCEDURE — 82668 ASSAY OF ERYTHROPOIETIN: CPT

## 2024-08-22 PROCEDURE — 83690 ASSAY OF LIPASE: CPT

## 2024-08-22 PROCEDURE — 80076 HEPATIC FUNCTION PANEL: CPT

## 2024-08-22 PROCEDURE — 83010 ASSAY OF HAPTOGLOBIN QUANT: CPT

## 2024-08-22 PROCEDURE — 82728 ASSAY OF FERRITIN: CPT

## 2024-08-22 PROCEDURE — 99223 1ST HOSP IP/OBS HIGH 75: CPT

## 2024-08-22 PROCEDURE — 93010 ELECTROCARDIOGRAM REPORT: CPT | Mod: 77

## 2024-08-22 PROCEDURE — 36415 COLL VENOUS BLD VENIPUNCTURE: CPT

## 2024-08-22 PROCEDURE — 93005 ELECTROCARDIOGRAM TRACING: CPT

## 2024-08-22 PROCEDURE — 86078 PHYS BLOOD BANK SERV REACTJ: CPT

## 2024-08-22 PROCEDURE — 85610 PROTHROMBIN TIME: CPT

## 2024-08-22 PROCEDURE — 80048 BASIC METABOLIC PNL TOTAL CA: CPT

## 2024-08-22 PROCEDURE — P9100: CPT

## 2024-08-22 PROCEDURE — 83615 LACTATE (LD) (LDH) ENZYME: CPT

## 2024-08-22 PROCEDURE — 74177 CT ABD & PELVIS W/CONTRAST: CPT | Mod: MC

## 2024-08-22 PROCEDURE — 87389 HIV-1 AG W/HIV-1&-2 AB AG IA: CPT

## 2024-08-22 PROCEDURE — 84100 ASSAY OF PHOSPHORUS: CPT

## 2024-08-22 PROCEDURE — 83540 ASSAY OF IRON: CPT

## 2024-08-22 PROCEDURE — 99285 EMERGENCY DEPT VISIT HI MDM: CPT

## 2024-08-22 PROCEDURE — 83735 ASSAY OF MAGNESIUM: CPT

## 2024-08-22 PROCEDURE — 85027 COMPLETE CBC AUTOMATED: CPT

## 2024-08-22 PROCEDURE — 82746 ASSAY OF FOLIC ACID SERUM: CPT

## 2024-08-22 PROCEDURE — 71260 CT THORAX DX C+: CPT | Mod: MC

## 2024-08-22 PROCEDURE — 71260 CT THORAX DX C+: CPT | Mod: 26

## 2024-08-22 PROCEDURE — 85025 COMPLETE CBC W/AUTO DIFF WBC: CPT

## 2024-08-22 PROCEDURE — 36430 TRANSFUSION BLD/BLD COMPNT: CPT

## 2024-08-22 PROCEDURE — 80074 ACUTE HEPATITIS PANEL: CPT

## 2024-08-22 PROCEDURE — 71045 X-RAY EXAM CHEST 1 VIEW: CPT | Mod: 26

## 2024-08-22 PROCEDURE — 81270 JAK2 GENE: CPT

## 2024-08-22 PROCEDURE — 82607 VITAMIN B-12: CPT

## 2024-08-22 PROCEDURE — 74177 CT ABD & PELVIS W/CONTRAST: CPT | Mod: 26

## 2024-08-22 PROCEDURE — 76705 ECHO EXAM OF ABDOMEN: CPT

## 2024-08-22 RX ORDER — LORAZEPAM 4 MG/ML
2 INJECTION INTRAMUSCULAR; INTRAVENOUS
Refills: 0 | Status: DISCONTINUED | OUTPATIENT
Start: 2024-08-22 | End: 2024-08-25

## 2024-08-22 RX ORDER — SODIUM CHLORIDE 9 MG/ML
1000 INJECTION INTRAMUSCULAR; INTRAVENOUS; SUBCUTANEOUS ONCE
Refills: 0 | Status: COMPLETED | OUTPATIENT
Start: 2024-08-22 | End: 2024-08-22

## 2024-08-22 RX ORDER — POTASSIUM CHLORIDE 10 MEQ
40 TABLET, EXT RELEASE, PARTICLES/CRYSTALS ORAL ONCE
Refills: 0 | Status: COMPLETED | OUTPATIENT
Start: 2024-08-22 | End: 2024-08-22

## 2024-08-22 RX ORDER — EPINEPHRINE 0.3 MG/.3ML
0.3 INJECTION INTRAMUSCULAR; SUBCUTANEOUS ONCE
Refills: 0 | Status: DISCONTINUED | OUTPATIENT
Start: 2024-08-22 | End: 2024-08-25

## 2024-08-22 RX ORDER — DIPHENHYDRAMINE HCL 50 MG
50 CAPSULE ORAL ONCE
Refills: 0 | Status: COMPLETED | OUTPATIENT
Start: 2024-08-22 | End: 2024-08-22

## 2024-08-22 RX ORDER — FOLIC ACID 1 MG
1 TABLET ORAL DAILY
Refills: 0 | Status: DISCONTINUED | OUTPATIENT
Start: 2024-08-22 | End: 2024-08-25

## 2024-08-22 RX ORDER — DIAZEPAM 10 MG
10 TABLET ORAL ONCE
Refills: 0 | Status: DISCONTINUED | OUTPATIENT
Start: 2024-08-22 | End: 2024-08-22

## 2024-08-22 RX ORDER — THIAMINE HCL 250 MG
100 TABLET ORAL ONCE
Refills: 0 | Status: COMPLETED | OUTPATIENT
Start: 2024-08-22 | End: 2024-08-22

## 2024-08-22 RX ORDER — DIPHENHYDRAMINE HCL 50 MG
25 CAPSULE ORAL EVERY 4 HOURS
Refills: 0 | Status: DISCONTINUED | OUTPATIENT
Start: 2024-08-22 | End: 2024-08-25

## 2024-08-22 RX ORDER — THIAMINE HCL 250 MG
100 TABLET ORAL DAILY
Refills: 0 | Status: DISCONTINUED | OUTPATIENT
Start: 2024-08-22 | End: 2024-08-25

## 2024-08-22 RX ORDER — FOLIC ACID 1 MG
1 TABLET ORAL ONCE
Refills: 0 | Status: COMPLETED | OUTPATIENT
Start: 2024-08-22 | End: 2024-08-22

## 2024-08-22 RX ADMIN — Medication 40 MILLIEQUIVALENT(S): at 12:13

## 2024-08-22 RX ADMIN — Medication 1000 MILLILITER(S): at 19:30

## 2024-08-22 RX ADMIN — Medication 100 MILLIGRAM(S): at 15:24

## 2024-08-22 RX ADMIN — Medication 50 MILLIGRAM(S): at 19:30

## 2024-08-22 RX ADMIN — Medication 10 MILLIGRAM(S): at 15:24

## 2024-08-22 RX ADMIN — SODIUM CHLORIDE 1000 MILLILITER(S): 9 INJECTION INTRAMUSCULAR; INTRAVENOUS; SUBCUTANEOUS at 11:33

## 2024-08-22 RX ADMIN — Medication 1 MILLIGRAM(S): at 15:24

## 2024-08-22 NOTE — ED PROVIDER NOTE - PHYSICAL EXAMINATION
CONST: Well appearing in NAD  EYES: EOMI, Sclera and conjunctiva clear.   ENT: No nasal discharge  NECK: Non-tender, no meningeal signs  CARD: Normal S1 S2; Normal rhythm and rate  RESP: Equal BS B/L, No wheezes, rhonchi or rales. No distress  GI: Soft, non-tender, non-distended.  MS: Normal ROM in all extremities  SKIN: Warm, dry, no acute rashes. Good turgor  NEURO: A&Ox3, No focal deficits. Strength 5/5 with no sensory deficits. Steady gait

## 2024-08-22 NOTE — ED ADULT NURSE NOTE - OBJECTIVE STATEMENT
Patient presents to ED for c/o midsternal sharp CP and light handedness. 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 Patient reports taking blood pressure medication this morning. A&O x4, ambulatory.

## 2024-08-22 NOTE — ED PROVIDER NOTE - CLINICAL SUMMARY MEDICAL DECISION MAKING FREE TEXT BOX
59y M, PMH of HTN, thrombocytopenia p/w chest pain. Patient was at work today having a cigarette when he suddenly experienced severe L-sided 10/10 stabbing chest pain. He reported the pain as non-radiating, non-exertional and ended spontaneously after a few seconds. He has had episodes of this chest pain before, follows Dr. Koo and had unremarkable stress test 1.5 yrs ago. Patient is chronic smoker ~[id number]. On exam, pt in NAD, AAOx3, head NC/AT, CN II-XII intact, PEERL, EOMi, neck (-) midline tenderness, lungs CTA B/L, CV S1S2 regular, abdomen soft/NT/ND/(+)BS, ext (-) edema, motor 5/5x4, sensation intact, ambulating with steady gait. Labs reviewed. Platelets noted. Heme/Onc consulted. Platelets ordered. Will admit for further management of severe thrombocytopenia.

## 2024-08-22 NOTE — SBIRT NOTE ADULT - NSSBIRTALCPASSREFTXDET_GEN_A_CORE
Patient provided with resources for reduction/cessation of tobacco. Patient's substance use will be addressed during their inpatient admission.  Patient provided with Remote SBIRT information. Patient provided with a referral to substance use treatment at [facility]. CATCH team engaged.

## 2024-08-22 NOTE — CONSULT NOTE ADULT - ASSESSMENT
patient is a 58yo male PMH htn coming to ED for chest pain. reports left sided sharp chest pain x2 hrs PTA lasting a few seconds that resolved. pt had associated SOB and nausea that also resolved. chest pain and SOB are non-exertional, no exacerbating or relieving factors. pt reports following with cardiology Dr. Koo more than 1 year ago, had negative exercise stress test 2 years ago.    Heme onc consulted for initial CBC showing plt count 8K    Impression:    # Chronic Thrombocytopenia 2/2 to Alcohol abuse r/o other causes  # Hx of erythrocytocis r/o under patient is a 58yo male PMH htn coming to ED for chest pain. reports left sided sharp chest pain x2 hrs PTA lasting a few seconds that resolved. pt had associated SOB and nausea that also resolved. chest pain and SOB are non-exertional, no exacerbating or relieving factors. pt reports following with cardiology Dr. Koo more than 1 year ago, had negative exercise stress test 2 years ago.    Heme onc consulted for initial CBC showing plt count 8K    Impression:    # Chronic Thrombocytopenia 2/2 to Alcohol abuse r/o other causes  # Hx of erythrocytosis r/o MPN  # Hx of EMILIANO    - previous labs reviewed , Plt count < 30 K for last 3 years. Plt count 8K (confirmed on peripheral smear)  - No evidence of increased bleeding (gum bleeding, node bleed, easy bruising)  - Erythrocytosis, Hb > 18 g/dl previously with Hct 50-55%. sEPO ~ 4.0 in 2022  - US spleen in 2022 : Spleen size 12 cm   - Endorses weight loss 30 lbs over last 2 years  - Drinks 1 pint of alcohol daily, smokes 1 PPD for > 20 years      # Alcohol use disorder  # Transaminitis r/o alcohol related  hepatitis    - AST/ /100, Bili 2.0    Recommendations:    - Please transfuse 1U of platelet and recheck CBC 1Hr post-transfusion  - Check B12/folate, Iron studies , LDH, serum MMA, Haptoglobin, hepatitis panel, HIV, serum Lipase, serum EPO  - send molecular studies for JAK2  - Check CT chest/abdomen/pelvis w/ IV contrast  to r/o underlying malignancy or lymphadenopathy (wt loss, heavy smoker, and alcohol use)  - Transfuse platelets if plt < 10K or if patient is bleeding  - Monitor for alcohol withdrawal  - Hepatology consult    Patient with chronic thrombocytopenia since 2021. Pt was evaluated by us 2022 for the same reason. Spleen was not enlarged and he had erythrocytosis at that time. sEPO was in low-normal range which warrants ruling out PV. Thrombocytopenia can be related to alcohol related BM suppression but isolated suppression if single cell lineage makes it doubtful. Will recommend 1hr post transfusion CBC check to r/o autoimmune phenomenon.    call back once above work up is complete.

## 2024-08-22 NOTE — H&P ADULT - HISTORY OF PRESENT ILLNESS
59y M pmh HTN, thrombocytopenia pw chest pain. Patient was at work today having a cigarette when he suddenly experienced severe L-sided 10/10 stabbing chest pain. He reported the pain as non-radiating, non-exertional and ended spontaneously. He has had episodes of this chest pain before, follows Dr. Koo and had unremarkable stress test >1yr ago. Patient is chronic smoker ~[id number]. 59y M pmh HTN, thrombocytopenia pw chest pain. Patient was at work today having a cigarette when he suddenly experienced severe L-sided 10/10 stabbing chest pain. He reported the pain as non-radiating, non-exertional and ended spontaneously. He has had episodes of this chest pain before, follows Dr. Koo and had unremarkable stress test >1yr ago. Patient is chronic smoker ~[id number].    Vitals in the ED  T 98.3    /93  RR 18 SpO2 99 On RA    Significant Labs  wbc 6.67 hgb 17 plt 8  Na 140 K 3.1 BUN/Cr 9/0.9  Tbili 2.0    /  trop <6  CXR -ve     Patient received  1u platelet in the ED. Transfusion was stopped for hypotension, chills and urticaria. Bedsides assessment completed with suspected transfusion reaction. Blood bank notified and STAT labs/UA sent. Sxs improved w/ 1L NS bolus, benadryl 50mg IV.  Admitted to medicine for management.

## 2024-08-22 NOTE — ED PROVIDER NOTE - ATTENDING APP SHARED VISIT CONTRIBUTION OF CARE
59y M, PMH of HTN, thrombocytopenia p/w chest pain. Patient was at work today having a cigarette when he suddenly experienced severe L-sided 10/10 stabbing chest pain. He reported the pain as non-radiating, non-exertional and ended spontaneously after a few seconds. He has had episodes of this chest pain before, follows Dr. Koo and had unremarkable stress test 1.5 yrs ago. Patient is chronic smoker ~[id number]. On exam, pt in NAD, AAOx3, head NC/AT, CN II-XII intact, PEERL, EOMi, neck (-) midline tenderness, lungs CTA B/L, CV S1S2 regular, abdomen soft/NT/ND/(+)BS, ext (-) edema, motor 5/5x4, sensation intact, ambulating with steady gait. Labs reviewed. Platelets noted. Heme/Onc consulted. Platelets ordered. Will admit for further management of severe thrombocytopenia.

## 2024-08-22 NOTE — H&P ADULT - NSHPPHYSICALEXAM_GEN_ALL_CORE
GEN: NAD, Resting comfortably in bed, cooperative  PULM: Clear to auscultation bilaterally, No wheezing, rales, rhonchi  CVS: Regular rate and rhythm, S1-S2, no murmurs, heaves, thrills  ABD: Soft, non-tender, non-distended, no guarding, BS +, no hepatomegaly   EXT: No edema/cyanosis, extremities warm/dry, peripheral pulses palpable, multiple area of ecchymosis, diffuse urticaria   NEURO: A&Ox3, no focal deficits, follows commands answers appropriately

## 2024-08-22 NOTE — H&P ADULT - ASSESSMENT
59y M pmh HTN, thrombocytopenia, alcohol use disorder pw chest pain admitted for acute thrombocytopenia c/b acute transfusion reaction.     #Acute on chronic thrombocytopenia  #Hx erythrocytosis   #Transfusion reaction   - PLT 8 this admission, bsl <30k, no evidence of bleeding   - Hx hgb >18 w/ hct 50-55%, sEPO 4 in 2022  - prior spleen US 12cm 2022  - sp 1u platelet - stopped after 15mins after urticaria and hypotension, afebrile without respiratory distress, low suspicion for TRALI or TACO  - fu b12, folate, iron panel, ldh, mma, haptoglobin, hepatitis panel, hiv, lipase, epo, jak2  - CT CAP w/ IV con for malignancy eval  - epinephrine 0.3mg IM PRN if anaphylaxis develops  - benadryl 25mg IV q6hr PRN for urticaria  - monitor platelets for now pending reaction workup, no active bleeding at this time  - fu heme onc    #Chest pain   #Hx EMILIANO  #Active smoker  - chest pain w/ spontaneous resolution   - trop -ve, ecg sinus tachy, no wheezing or respiratory distress  - fw [facility] op - stress test -ve  - 30packyr smoker  - may be musculoskeletal in origin   - needs op pulm fu for Lung cancer screening  - monitor for chest pain     #Mixed pattern of liver injury   #Alcohol use disorder  - Tbili 2.0, , AST//100 - suspect iso alcohol use  - drinks 1 pint liquor daily  - no organomegaly on exam   - RUQ US, trend LFTs, check coags   - hepatology eval per heme/onc    #HTN  - hold home BP meds iso hypotension   - confirm medication w/ pharmacy   - lisinopril 20mg qd, coreg 3.125 bid, amlodipine 10mg qd    #HANDOFF  - monitor for anaphylaxis, fu transfusion rxn labs  - fu heme/onc and hepatology eval  - ruq us     # Misc  - DVT Prophylaxis: SCDs  - GI Prophylaxis: none  - Diet: Diet, Regular  - Activity: Activity - Ambulate as Tolerated  - Code Status: Full   - Dispo: MED/SURG    Billy Huerta  PGY3, Internal Medicine   [facility]     59y M pmh HTN, thrombocytopenia, alcohol use disorder pw chest pain admitted for acute thrombocytopenia c/b acute transfusion reaction.     #Acute on chronic thrombocytopenia  #Hx erythrocytosis   #Transfusion reaction   - PLT 8 this admission, bsl <30k, no evidence of bleeding   - Hx hgb >18 w/ hct 50-55%, sEPO 4 in 2022  - prior spleen US 12cm 2022  - sp 1u platelet - stopped after 15mins after urticaria and hypotension, afebrile without respiratory distress, low suspicion for TRALI or TACO  - fu b12, folate, iron panel, ldh, mma, haptoglobin, hepatitis panel, hiv, lipase, epo, jak2  - CT CAP w/ IV con for malignancy eval  - epinephrine 0.3mg IM PRN if anaphylaxis develops  - benadryl 25mg IV q6hr PRN for urticaria  - monitor platelets for now pending reaction workup, no active bleeding at this time  - fu heme onc    #Chest pain   #Hx EMILIANO  #Active smoker  - chest pain w/ spontaneous resolution   - trop -ve, ecg sinus tachy, no wheezing or respiratory distress  - fw [facility] op - stress test -ve  - 30packyr smoker  - may be musculoskeletal in origin   - needs op pulm fu for Lung cancer screening  - monitor for chest pain     #Mixed pattern of liver injury   #Alcohol use disorder  # Etoh hepatitis MDF < 32  - Tbili 2.0, , AST//100 - suspect iso alcohol use  - drinks 1 pint liquor daily  - no organomegaly on exam   - RUQ US, trend LFTs, check coags   - hepatology eval per heme/onc      #HTN  - hold home BP meds iso hypotension   - confirm medication w/ pharmacy   - lisinopril 20mg qd, coreg 3.125 bid, amlodipine 10mg qd    #HANDOFF  - monitor for anaphylaxis, fu transfusion rxn labs  - fu heme/onc and hepatology eval  - ruq us   - trend LFTs    # Misc  - DVT Prophylaxis: SCDs  - GI Prophylaxis: none  - Diet: Diet, Regular  - Activity: Activity - Ambulate as Tolerated  - Code Status: Full   - Dispo: MED/SURG    Billy Huerta  PGY3, Internal Medicine   [facility]

## 2024-08-22 NOTE — ED PROVIDER NOTE - OBJECTIVE STATEMENT
patient is a 58yo male PMH htn coming to ED for chest pain. reports left sided sharp chest pain x2 hrs PTA lasting a few seconds that resolved. pt had associated SOB and nausea that also resolved. chest pain and SOB are non-exertional, no exacerbating or relieving factors. pt reports following with cardiology Dr. Koo more than 1 year ago, had negative exercise stress test 2 years ago. denies abdominal pain, vomiting, diarrhea, back pain, leg pain/ swelling, recent travel, rash, fever

## 2024-08-22 NOTE — ED ADULT NURSE NOTE - CCCP TRG CHIEF CMPLNT
Group Therapy Note    Date: 7/8/2024    Group Start Time:  2:00 PM  Group End Time:  2:45 PM  Group Topic: Wrap-Up    SSR Jorge Lr MSW        Group Therapy Note    Writer facilitated group and started with requesting patients to complete wrap up sheets and identify a key takeaway from the day. Patients shared their takeaways and reflected on it with peers. To conclude, writer facilitated a game of feelings sudhakar the rest of group. Patients were asked to identify a coping skills, one they learned, one thing they are grateful for, and a fun fact depending on the color of the card they put down.        Attendees: 4        Patient's Goal: to assess safety, to identify coping skills, to identify key takeaway       Notes:  Pt shared takeaway was processing and gaining skills on how to draw boundaries with mom. Patient denied SI/HI/MEHREEN on wrap up sheet. Pt engaged in the game and identified being grateful for the groups.    Status After Intervention:  Improved    Participation Level: Active Listener    Participation Quality: Appropriate      Speech:  normal      Thought Process/Content: Logical      Affective Functioning: Congruent      Mood: anxious      Level of consciousness:  Alert and Oriented x4      Response to Learning: Capable of insight and Progressing to goal      Endings: None Reported    Modes of Intervention: Support, Socialization, and Exploration      Discipline Responsible: /Counselor      Signature:  DANIEL Mccarty     light headed/chest pain

## 2024-08-22 NOTE — H&P ADULT - ATTENDING COMMENTS
The patient is seen and examined the history labs and imaging are reviewed. I agree with the resident note unless otherwise noted. Patient was seen and examined in 8/22/20204. Agree with the plan.

## 2024-08-22 NOTE — ED ADULT NURSE NOTE - NSFALLHARMRISKINTERV_ED_ALL_ED

## 2024-08-22 NOTE — SBIRT NOTE ADULT - NSSBIRTBRIEFINTDET_GEN_A_CORE
Screening results were reviewed with the patient. Patient was provided educational materials on low-risk guidelines and substance use and health. Motivation and goals were discussed.

## 2024-08-22 NOTE — ED PROVIDER NOTE - PROGRESS NOTE DETAILS
discussed thrombocytopenia with hem/onc- wants to r/o ITP vs alochol related, recommending 1 unit platelets and then CBC 1 hour after completion.

## 2024-08-23 LAB
ALBUMIN SERPL ELPH-MCNC: 3.9 G/DL — SIGNIFICANT CHANGE UP (ref 3.5–5.2)
ALP SERPL-CCNC: 100 U/L — SIGNIFICANT CHANGE UP (ref 30–115)
ALT FLD-CCNC: 86 U/L — HIGH (ref 0–41)
ANION GAP SERPL CALC-SCNC: 13 MMOL/L — SIGNIFICANT CHANGE UP (ref 7–14)
AST SERPL-CCNC: 129 U/L — HIGH (ref 0–41)
BASOPHILS # BLD AUTO: 0.02 K/UL — SIGNIFICANT CHANGE UP (ref 0–0.2)
BASOPHILS NFR BLD AUTO: 0.3 % — SIGNIFICANT CHANGE UP (ref 0–1)
BILIRUB SERPL-MCNC: 2 MG/DL — HIGH (ref 0.2–1.2)
BUN SERPL-MCNC: 13 MG/DL — SIGNIFICANT CHANGE UP (ref 10–20)
CALCIUM SERPL-MCNC: 8.7 MG/DL — SIGNIFICANT CHANGE UP (ref 8.4–10.5)
CHLORIDE SERPL-SCNC: 100 MMOL/L — SIGNIFICANT CHANGE UP (ref 98–110)
CO2 SERPL-SCNC: 28 MMOL/L — SIGNIFICANT CHANGE UP (ref 17–32)
CREAT SERPL-MCNC: 1 MG/DL — SIGNIFICANT CHANGE UP (ref 0.7–1.5)
EGFR: 86 ML/MIN/1.73M2 — SIGNIFICANT CHANGE UP
EOSINOPHIL # BLD AUTO: 0.03 K/UL — SIGNIFICANT CHANGE UP (ref 0–0.7)
EOSINOPHIL NFR BLD AUTO: 0.5 % — SIGNIFICANT CHANGE UP (ref 0–8)
GLUCOSE SERPL-MCNC: 164 MG/DL — HIGH (ref 70–99)
HCT VFR BLD CALC: 49.2 % — SIGNIFICANT CHANGE UP (ref 42–52)
HGB BLD-MCNC: 16.8 G/DL — SIGNIFICANT CHANGE UP (ref 14–18)
IMM GRANULOCYTES NFR BLD AUTO: 0.3 % — SIGNIFICANT CHANGE UP (ref 0.1–0.3)
IRON SATN MFR SERPL: 141 UG/DL — SIGNIFICANT CHANGE UP (ref 35–150)
IRON SATN MFR SERPL: 65 % — HIGH (ref 15–50)
LDH SERPL L TO P-CCNC: 191 U/L — SIGNIFICANT CHANGE UP (ref 50–242)
LIDOCAIN IGE QN: 79 U/L — HIGH (ref 7–60)
LYMPHOCYTES # BLD AUTO: 0.96 K/UL — LOW (ref 1.2–3.4)
LYMPHOCYTES # BLD AUTO: 16.8 % — LOW (ref 20.5–51.1)
MAGNESIUM SERPL-MCNC: 1.8 MG/DL — SIGNIFICANT CHANGE UP (ref 1.8–2.4)
MCHC RBC-ENTMCNC: 34.1 G/DL — SIGNIFICANT CHANGE UP (ref 32–37)
MCHC RBC-ENTMCNC: 34.2 PG — HIGH (ref 27–31)
MCV RBC AUTO: 100.2 FL — HIGH (ref 80–94)
MONOCYTES # BLD AUTO: 0.53 K/UL — SIGNIFICANT CHANGE UP (ref 0.1–0.6)
MONOCYTES NFR BLD AUTO: 9.2 % — SIGNIFICANT CHANGE UP (ref 1.7–9.3)
NEUTROPHILS # BLD AUTO: 4.17 K/UL — SIGNIFICANT CHANGE UP (ref 1.4–6.5)
NEUTROPHILS NFR BLD AUTO: 72.9 % — SIGNIFICANT CHANGE UP (ref 42.2–75.2)
NRBC # BLD: 0 /100 WBCS — SIGNIFICANT CHANGE UP (ref 0–0)
PHOSPHATE SERPL-MCNC: 2 MG/DL — LOW (ref 2.1–4.9)
PLATELET # BLD AUTO: 10 K/UL — CRITICAL LOW (ref 130–400)
PMV BLD: 13 FL — HIGH (ref 7.4–10.4)
POTASSIUM SERPL-MCNC: 3.6 MMOL/L — SIGNIFICANT CHANGE UP (ref 3.5–5)
POTASSIUM SERPL-SCNC: 3.6 MMOL/L — SIGNIFICANT CHANGE UP (ref 3.5–5)
PROT SERPL-MCNC: 6.5 G/DL — SIGNIFICANT CHANGE UP (ref 6–8)
RBC # BLD: 4.91 M/UL — SIGNIFICANT CHANGE UP (ref 4.7–6.1)
RBC # FLD: 13.4 % — SIGNIFICANT CHANGE UP (ref 11.5–14.5)
SODIUM SERPL-SCNC: 141 MMOL/L — SIGNIFICANT CHANGE UP (ref 135–146)
TIBC SERPL-MCNC: 217 UG/DL — LOW (ref 220–430)
UIBC SERPL-MCNC: 76 UG/DL — LOW (ref 110–370)
WBC # BLD: 5.73 K/UL — SIGNIFICANT CHANGE UP (ref 4.8–10.8)
WBC # FLD AUTO: 5.73 K/UL — SIGNIFICANT CHANGE UP (ref 4.8–10.8)

## 2024-08-23 PROCEDURE — 99233 SBSQ HOSP IP/OBS HIGH 50: CPT

## 2024-08-23 PROCEDURE — 99223 1ST HOSP IP/OBS HIGH 75: CPT

## 2024-08-23 RX ORDER — SODIUM PHOSPHATE, DIBASIC, ANHYDROUS, POTASSIUM PHOSPHATE, MONOBASIC, AND SODIUM PHOSPHATE, MONOBASIC, MONOHYDRATE 852; 155; 130 MG/1; MG/1; MG/1
1 TABLET, COATED ORAL
Refills: 0 | Status: COMPLETED | OUTPATIENT
Start: 2024-08-23 | End: 2024-08-24

## 2024-08-23 RX ORDER — IMMUNE GLOBULIN (HUMAN) 10 G/100ML
75 INJECTION INTRAVENOUS; SUBCUTANEOUS DAILY
Refills: 0 | Status: COMPLETED | OUTPATIENT
Start: 2024-08-23 | End: 2024-08-25

## 2024-08-23 RX ORDER — PREDNISONE 10 MG
80 TABLET, DOSE PACK ORAL DAILY
Refills: 0 | Status: DISCONTINUED | OUTPATIENT
Start: 2024-08-23 | End: 2024-08-25

## 2024-08-23 RX ADMIN — Medication 100 MILLIGRAM(S): at 12:11

## 2024-08-23 RX ADMIN — Medication 1 MILLIGRAM(S): at 12:11

## 2024-08-23 RX ADMIN — Medication 80 MILLIGRAM(S): at 17:45

## 2024-08-23 RX ADMIN — IMMUNE GLOBULIN (HUMAN) 375 GRAM(S): 10 INJECTION INTRAVENOUS; SUBCUTANEOUS at 17:44

## 2024-08-23 RX ADMIN — SODIUM PHOSPHATE, DIBASIC, ANHYDROUS, POTASSIUM PHOSPHATE, MONOBASIC, AND SODIUM PHOSPHATE, MONOBASIC, MONOHYDRATE 1 PACKET(S): 852; 155; 130 TABLET, COATED ORAL at 17:45

## 2024-08-23 NOTE — PROGRESS NOTE ADULT - SUBJECTIVE AND OBJECTIVE BOX
YOHANNES SNYDER 59y Male  MRN#: [id number]   Hospital Day: 1d    SUBJECTIVE  Patient is a 59y old Male who presents with a chief complaint of thrombocytopenia    INTERVAL HPI AND OVERNIGHT EVENTS:      REVIEW OF SYMPTOMS:    OBJECTIVE  PAST MEDICAL & SURGICAL HISTORY  HTN (hypertension)      ALLERGIES:  No Known Allergies    MEDICATIONS:  STANDING MEDICATIONS  folic acid 1 milliGRAM(s) Oral daily  potassium phosphate / sodium phosphate Powder (PHOS-NaK) 1 Packet(s) Oral two times a day  thiamine 100 milliGRAM(s) Oral daily    PRN MEDICATIONS  albuterol    0.083% 2.5 milliGRAM(s) Nebulizer every 6 hours PRN  diphenhydrAMINE Injectable 25 milliGRAM(s) IntraMuscular every 4 hours PRN  EPINEPHrine     1 mG/mL Injectable 0.3 milliGRAM(s) IntraMuscular once PRN  LORazepam   Injectable 2 milliGRAM(s) IV Push every 2 hours PRN      VITAL SIGNS: Last 24 Hours  T(C): 36.6 (23 Aug 2024 05:01), Max: 37.7 (22 Aug 2024 19:27)  T(F): 97.9 (23 Aug 2024 05:01), Max: 99.8 (22 Aug 2024 19:27)  HR: 85 (23 Aug 2024 05:01) (85 - 114)  BP: 132/85 (23 Aug 2024 05:01) (66/52 - 155/94)  BP(mean): --  RR: 18 (23 Aug 2024 05:01) (18 - 22)  SpO2: 97% (23 Aug 2024 05:01) (95% - 100%)    LABS:                        16.8   5.73  )-----------( 10       ( 23 Aug 2024 11:20 )             49.2     08-23    141  |  100  |  13  ----------------------------<  164<H>  3.6   |  28  |  1.0    Ca    8.7      23 Aug 2024 11:20  Phos  2.0     08-23  Mg     1.8     08-23    TPro  6.5  /  Alb  3.9  /  TBili  2.0<H>  /  DBili  x   /  AST  129<H>  /  ALT  86<H>  /  AlkPhos  100  08-23    PT/INR - ( 22 Aug 2024 20:22 )   PT: 13.10 sec;   INR: 1.15 ratio         PTT - ( 22 Aug 2024 20:22 )  PTT:29.7 sec  Urinalysis Basic - ( 23 Aug 2024 11:20 )    Color: x / Appearance: x / SG: x / pH: x  Gluc: 164 mg/dL / Ketone: x  / Bili: x / Urobili: x   Blood: x / Protein: x / Nitrite: x   Leuk Esterase: x / RBC: x / WBC x   Sq Epi: x / Non Sq Epi: x / Bacteria: x                RADIOLOGY:      PHYSICAL EXAM:  CONSTITUTIONAL: No acute distress, well-developed, well-groomed, AAOx3  HEAD: Atraumatic, normocephalic  EYES: EOM intact, PERRLA, conjunctiva and sclera clear  ENT: Supple, no masses, no thyromegaly, no bruits, no JVD; moist mucous membranes  PULMONARY: Clear to auscultation bilaterally; no wheezes, rales, or rhonchi  CARDIOVASCULAR: Regular rate and rhythm; no murmurs, rubs, or gallops  GASTROINTESTINAL: Soft, non-tender, non-distended; bowel sounds present  MUSCULOSKELETAL: 2+ peripheral pulses; no clubbing, no cyanosis, no edema  NEUROLOGY: non-focal  SKIN: No rashes or lesions; warm and dry       YOHANNES SNYDER 59y Male  MRN#: [id number]   Hospital Day: 1d    SUBJECTIVE  Patient is a 59y old Male who presents with a chief complaint of thrombocytopenia    INTERVAL HPI AND OVERNIGHT EVENTS:  No new complains  No bleeding      REVIEW OF SYMPTOMS:  No active complains  Denies any chest/abdominal pain    OBJECTIVE  PAST MEDICAL & SURGICAL HISTORY  HTN (hypertension)      ALLERGIES:  No Known Allergies    MEDICATIONS:  STANDING MEDICATIONS  folic acid 1 milliGRAM(s) Oral daily  potassium phosphate / sodium phosphate Powder (PHOS-NaK) 1 Packet(s) Oral two times a day  thiamine 100 milliGRAM(s) Oral daily    PRN MEDICATIONS  albuterol    0.083% 2.5 milliGRAM(s) Nebulizer every 6 hours PRN  diphenhydrAMINE Injectable 25 milliGRAM(s) IntraMuscular every 4 hours PRN  EPINEPHrine     1 mG/mL Injectable 0.3 milliGRAM(s) IntraMuscular once PRN  LORazepam   Injectable 2 milliGRAM(s) IV Push every 2 hours PRN      VITAL SIGNS: Last 24 Hours  T(C): 36.6 (23 Aug 2024 05:01), Max: 37.7 (22 Aug 2024 19:27)  T(F): 97.9 (23 Aug 2024 05:01), Max: 99.8 (22 Aug 2024 19:27)  HR: 85 (23 Aug 2024 05:01) (85 - 114)  BP: 132/85 (23 Aug 2024 05:01) (66/52 - 155/94)  BP(mean): --  RR: 18 (23 Aug 2024 05:01) (18 - 22)  SpO2: 97% (23 Aug 2024 05:01) (95% - 100%)    LABS:                        16.8   5.73  )-----------( 10       ( 23 Aug 2024 11:20 )             49.2     08-23    141  |  100  |  13  ----------------------------<  164<H>  3.6   |  28  |  1.0    Ca    8.7      23 Aug 2024 11:20  Phos  2.0     08-23  Mg     1.8     08-23    TPro  6.5  /  Alb  3.9  /  TBili  2.0<H>  /  DBili  x   /  AST  129<H>  /  ALT  86<H>  /  AlkPhos  100  08-23    PT/INR - ( 22 Aug 2024 20:22 )   PT: 13.10 sec;   INR: 1.15 ratio         PTT - ( 22 Aug 2024 20:22 )  PTT:29.7 sec  Urinalysis Basic - ( 23 Aug 2024 11:20 )    Color: x / Appearance: x / SG: x / pH: x  Gluc: 164 mg/dL / Ketone: x  / Bili: x / Urobili: x   Blood: x / Protein: x / Nitrite: x   Leuk Esterase: x / RBC: x / WBC x   Sq Epi: x / Non Sq Epi: x / Bacteria: x      RADIOLOGY:      PHYSICAL EXAM:    CONSTITUTIONAL: No acute distress, well-developed, well-groomed, AAOx3  HEAD: Atraumatic, normocephalic  EYES: EOM intact, PERRLA, conjunctiva and sclera clear  ENT: Supple, no masses, no thyromegaly, no bruits, no JVD; moist mucous membranes  PULMONARY: Clear to auscultation bilaterally; no wheezes, rales, or rhonchi  CARDIOVASCULAR: Regular rate and rhythm; no murmurs, rubs, or gallops  GASTROINTESTINAL: Soft, non-tender, non-distended; bowel sounds present  MUSCULOSKELETAL: 2+ peripheral pulses; no clubbing, no cyanosis, no edema  NEUROLOGY: non-focal

## 2024-08-23 NOTE — PROGRESS NOTE ADULT - SUBJECTIVE AND OBJECTIVE BOX
24H events:    Patient is a 59y old Male who presents with a chief complaint of thrombocytopenia (23 Aug 2024 09:17)    Primary diagnosis of Thrombocytopenia  Today is hospital day 1d. This morning patient was seen and examined at bedside, resting comfortably in bed.    No acute or major events overnight.    Code Status: Full    PAST MEDICAL & SURGICAL HISTORY  HTN (hypertension)      SOCIAL HISTORY:  Social History:      ALLERGIES:  No Known Allergies    MEDICATIONS:  STANDING MEDICATIONS  folic acid 1 milliGRAM(s) Oral daily  potassium phosphate / sodium phosphate Powder (PHOS-NaK) 1 Packet(s) Oral two times a day  thiamine 100 milliGRAM(s) Oral daily    PRN MEDICATIONS  albuterol    0.083% 2.5 milliGRAM(s) Nebulizer every 6 hours PRN  diphenhydrAMINE Injectable 25 milliGRAM(s) IntraMuscular every 4 hours PRN  EPINEPHrine     1 mG/mL Injectable 0.3 milliGRAM(s) IntraMuscular once PRN  LORazepam   Injectable 2 milliGRAM(s) IV Push every 2 hours PRN    VITALS:   T(F): 97.9  HR: 85  BP: 132/85  RR: 18  SpO2: 97%    PHYSICAL EXAM:  GENERAL:   ( x ) NAD, lying in bed comfortably     (  ) obtunded     (  ) lethargic     (  ) somnolent    HEAD:   ( x ) Atraumatic     (  ) hematoma     (  ) laceration (specify location:       )     NECK:  ( x ) Supple     (  ) neck stiffness     (  ) nuchal rigidity     (  )  no JVD     (  ) JVD present ( -- cm)    HEART:  Rate -->     ( x ) normal rate     (  ) bradycardic     (  ) tachycardic  Rhythm -->     ( x ) regular     (  ) regularly irregular     (  ) irregularly irregular  Murmurs -->     ( x ) normal s1s2     (  ) systolic murmur     (  ) diastolic murmur     (  ) continuous murmur      (  ) S3 present     (  ) S4 present    LUNGS:   ( x )Unlabored respirations     (  ) tachypnea  (  ) B/L air entry     (  ) decreased breath sounds in:  (location     )    (  x) no adventitious sound     (  ) crackles     (  ) wheezing      (  ) rhonchi      (specify location:       )  (  ) chest wall tenderness (specify location:       )    ABDOMEN:   ( x ) Soft     (  ) tense   |   ( x ) nondistended     (  ) distended   |   ( x ) +BS     (  ) hypoactive bowel sounds     (  ) hyperactive bowel sounds  ( x ) nontender     (  ) RUQ tenderness     (  ) RLQ tenderness     (  ) LLQ tenderness     (  ) epigastric tenderness     (  ) diffuse tenderness  (  ) Splenomegaly      (  ) Hepatomegaly      (  ) Jaundice     (  ) ecchymosis     EXTREMITIES:  ( x ) Normal     (  ) Rash     (  ) ecchymosis     (  ) varicose veins      (  ) pitting edema     (  ) non-pitting edema   (  ) ulceration     (  ) gangrene:     (location:     )    NERVOUS SYSTEM:    ( x ) A&Ox3     (  ) confused     (  ) lethargic  CN II-XII:     (  ) Intact     (  ) deficits found     (Specify:     )   Upper extremities:     (  ) no sensorimotor deficits     (  ) weakness     (  ) loss of proprioception/vibration     (  ) loss of touch/temperature (specify:    )  Lower extremities:     (  ) no sensorimotor deficits     (  ) weakness     (  ) loss of proprioception/vibration     (  ) loss of touch/temperature (specify:    )    LABS:                        16.8   5.73  )-----------( 10       ( 23 Aug 2024 11:20 )             49.2     08-23    141  |  100  |  13  ----------------------------<  164<H>  3.6   |  28  |  1.0    Ca    8.7      23 Aug 2024 11:20  Phos  2.0     08-23  Mg     1.8     08-23    TPro  6.5  /  Alb  3.9  /  TBili  2.0<H>  /  DBili  x   /  AST  129<H>  /  ALT  86<H>  /  AlkPhos  100  08-23    PT/INR - ( 22 Aug 2024 20:22 )   PT: 13.10 sec;   INR: 1.15 ratio         PTT - ( 22 Aug 2024 20:22 )  PTT:29.7 sec  Urinalysis Basic - ( 23 Aug 2024 11:20 )    Color: x / Appearance: x / SG: x / pH: x  Gluc: 164 mg/dL / Ketone: x  / Bili: x / Urobili: x   Blood: x / Protein: x / Nitrite: x   Leuk Esterase: x / RBC: x / WBC x   Sq Epi: x / Non Sq Epi: x / Bacteria: x                RADIOLOGY:

## 2024-08-23 NOTE — PROGRESS NOTE ADULT - ASSESSMENT
patient is a 58yo male PMH htn coming to ED for chest pain. reports left sided sharp chest pain x2 hrs PTA lasting a few seconds that resolved. pt had associated SOB and nausea that also resolved. chest pain and SOB are non-exertional, no exacerbating or relieving factors. pt reports following with cardiology Dr. Koo more than 1 year ago, had negative exercise stress test 2 years ago.    [facility] onc consulted for initial CBC showing plt count 8K    Impression:    # Chronic Thrombocytopenia 2/2 to Alcohol abuse r/o other causes  # Hx of erythrocytosis r/o MPN  # Hx of EMILIANO    - previous labs reviewed , Plt count < 30 K for last 3 years. Plt count 8K (confirmed on peripheral smear)  - No evidence of increased bleeding (gum bleeding, node bleed, easy bruising)  - Erythrocytosis, Hb > 18 g/dl previously with Hct 50-55%. sEPO ~ 4.0 in 2022  - US spleen in 2022 : Spleen size 12 cm   - Endorses weight loss 30 lbs over last 2 years  - Drinks 1 pint of alcohol daily, smokes 1 PPD for > 20 years      # Alcohol use disorder  # Transaminitis r/o alcohol related  hepatitis    - AST/ /100, Bili 2.0    Recommendations:    S/p 1 unit platelet transfusion platelet only increased from 8--15  Check B12/folate, Iron studies , LDH, serum MMA, Haptoglobin, hepatitis panel, HIV, serum Lipase, serum EPO  Send molecular studies for JAK2  CT chest abdomen reviewed; shows wall thickening of cecum and ascending colon, spleen unremarkable  Will need screening colonoscopy, likely outpatient, when platelet better  Transfuse platelets if plt < 10K or if patient is bleeding  Monitor for alcohol withdrawal      Patient with chronic thrombocytopenia since 2021. Pt was evaluated by us 2022 for the same reason. Spleen was not enlarged and he had erythrocytosis at that time. sEPO was in low-normal range which warrants ruling out PV. Thrombocytopenia can be related to alcohol related BM suppression but isolated suppression if single cell lineage makes it doubtful.      patient is a 58yo male PMH htn coming to ED for chest pain. reports left sided sharp chest pain x2 hrs PTA lasting a few seconds that resolved. pt had associated SOB and nausea that also resolved. chest pain and SOB are non-exertional, no exacerbating or relieving factors. pt reports following with cardiology Dr. Koo more than 1 year ago, had negative exercise stress test 2 years ago.    [facility]eme onc consulted for initial CBC showing plt count 8K    Impression:    # Suspected ITP  # Chronic thrombocytopenia  # Hx of erythrocytosis r/o MPN  # Hx of EMILIANO    - previous labs reviewed , Plt count < 30 K for last 3 years. Plt count 8K (confirmed on peripheral smear)  - No evidence of increased bleeding (gum bleeding, node bleed, easy bruising)  - Erythrocytosis, Hb > 18 g/dl previously with Hct 50-55%. sEPO ~ 4.0 in 2022  - US spleen in 2022 : Spleen size 12 cm   - Endorses weight loss 30 lbs over last 2 years  - Drinks 1 pint of alcohol daily, smokes 1 PPD for > 20 years  CT abdomen during this admission reviewed: no splenomegaly, has wall thickening in cecum and ascending colon, CT chest shows emphysema      # Alcohol use disorder  # Transaminitis r/o alcohol related  hepatitis    - AST/ /100, Bili 2.0    Recommendations:    S/p 1 unit platelet transfusion platelet only increased from 8--15 and today it is 10  Now ITP is high on differential  Recommend IVIG 1 gm/kg daily x two doses, prednisone 1 mg/kg daily   Also add PPI with steroids    Check B12/folate, Iron studies , LDH, serum MMA, Haptoglobin, hepatitis panel, HIV, serum Lipase, serum EPO  Send molecular studies for JAK2  Will need screening colonoscopy, likely outpatient, when platelet better  Transfuse platelets only if patient is bleeding         patient is a 60yo male PMH htn coming to ED for chest pain. reports left sided sharp chest pain x2 hrs PTA lasting a few seconds that resolved. pt had associated SOB and nausea that also resolved. chest pain and SOB are non-exertional, no exacerbating or relieving factors. pt reports following with cardiology Dr. Koo more than 1 year ago, had negative exercise stress test 2 years ago.    [facility]eme onc consulted for initial CBC showing plt count 8K    Impression:    # Suspected ITP  # Chronic thrombocytopenia  # Hx of erythrocytosis r/o MPN  # Hx of EMILIANO    - previous labs reviewed , Plt count < 30 K for last 3 years. Plt count 8K (confirmed on peripheral smear)  - No evidence of increased bleeding (gum bleeding, node bleed, easy bruising)  - Erythrocytosis, Hb > 18 g/dl previously with Hct 50-55%. sEPO ~ 4.0 in 2022  - US spleen in 2022 : Spleen size 12 cm   - Endorses weight loss 30 lbs over last 2 years  - Drinks 1 pint of alcohol daily, smokes 1 PPD for > 20 years  CT abdomen during this admission reviewed: no splenomegaly, has wall thickening in cecum and ascending colon, CT chest shows emphysema      # Alcohol use disorder  # Transaminitis r/o alcohol related  hepatitis    - AST/ /100, Bili 2.0    Recommendations:    S/p 1 unit platelet transfusion platelet only increased from 8--15 and today it is 10  Now ITP is high on differential  Recommend IVIG 1 gm/kg daily x two doses, prednisone 1 mg/kg daily   Also add PPI with steroids  Monitor CBC daily    Check B12/folate, Iron studies , LDH, serum MMA, Haptoglobin, hepatitis panel, HIV, serum Lipase, serum EPO  Send molecular studies for JAK2  Will need screening colonoscopy, likely outpatient, when platelet better  Transfuse platelets only if patient is bleeding

## 2024-08-23 NOTE — CONSULT NOTE ADULT - SUBJECTIVE AND OBJECTIVE BOX
-------------------------------------------------------------------------------------------------------------------------------------------------------------------------------  HEPATOLOGY INITIAL CONSULT  -------------------------------------------------------------------------------------------------------------------------------------------------------------------------------    HPI:  59y M pmh HTN, thrombocytopenia pw chest pain. Patient was at work today having a cigarette when he suddenly experienced severe L-sided 10/10 stabbing chest pain. He reported the pain as non-radiating, non-exertional and ended spontaneously. He has had episodes of this chest pain before, follows Dr. Koo and had unremarkable stress test >1yr ago. Patient is chronic smoker ~[id number].    Patient received  1u platelet in the ED. Transfusion was stopped for hypotension, chills and urticaria. Bedsides assessment completed with suspected transfusion reaction. Blood bank notified and STAT labs/UA sent. Sxs improved w/ 1L NS bolus, benadryl 50mg IV.  Admitted to medicine for management.  hepatolkogy was consulted for etoh hepatitis     PAST MEDICAL & SURGICAL HISTORY:  HTN (hypertension)          Review of Systems: Negative except as per HPI.    MEDICATIONS  (STANDING):  folic acid 1 milliGRAM(s) Oral daily  immune   globulin 10% (GAMMAGARD) IVPB 75 Gram(s) IV Intermittent daily  potassium phosphate / sodium phosphate Powder (PHOS-NaK) 1 Packet(s) Oral two times a day  predniSONE   Tablet 80 milliGRAM(s) Oral daily  thiamine 100 milliGRAM(s) Oral daily    MEDICATIONS  (PRN):  albuterol    0.083% 2.5 milliGRAM(s) Nebulizer every 6 hours PRN Shortness of Breath and/or Wheezing  diphenhydrAMINE Injectable 25 milliGRAM(s) IntraMuscular every 4 hours PRN Rash and/or Itching  EPINEPHrine     1 mG/mL Injectable 0.3 milliGRAM(s) IntraMuscular once PRN anaphylaxis  LORazepam   Injectable 2 milliGRAM(s) IV Push every 2 hours PRN CIWA-Ar score 8 or greater      ALLERGIES:      SOCIAL HISTORY:  - Alcohol:  - Recreational drug use:    FAMILY HISTORY:  Family history of atherosclerosis (Sibling)        Vital Signs Last 24 Hrs  T(C): 36.6 (23 Aug 2024 14:35), Max: 37.7 (22 Aug 2024 19:27)  T(F): 97.9 (23 Aug 2024 14:35), Max: 99.8 (22 Aug 2024 19:27)  HR: 78 (23 Aug 2024 14:35) (78 - 114)  BP: 122/71 (23 Aug 2024 14:35) (66/52 - 155/94)  BP(mean): --  RR: 18 (23 Aug 2024 14:35) (18 - 22)  SpO2: 95% (23 Aug 2024 14:35) (95% - 98%)    Parameters below as of 23 Aug 2024 14:35  Patient On (Oxygen Delivery Method): room air        PHYSICAL EXAM:  Constitutional: no acute distress  Eyes: no icterus  Neck: no masses, no LAD  Respiratory: normal inspiratory effort; no wheezing or crackles  Cardiovascular: RRR, normal S1/S2, no murmurs/rubs/gallops  Gastrointestinal: soft, nondistended, nontender, +BS  Rectal:   Extremities: no LE edema  Neurological: AAOx3, no asterixis  Skin: no rashes, bruises, petechiae    LABS:                        16.8   5.73  )-----------( 10       ( 23 Aug 2024 11:20 )             49.2     08-23    141  |  100  |  13  ----------------------------<  164<H>  3.6   |  28  |  1.0    Ca    8.7      23 Aug 2024 11:20  Phos  2.0     08-23  Mg     1.8     08-23    TPro  6.5  /  Alb  3.9  /  TBili  2.0<H>  /  DBili  x   /  AST  129<H>  /  ALT  86<H>  /  AlkPhos  100  08-23    PT/INR - ( 22 Aug 2024 20:22 )   PT: 13.10 sec;   INR: 1.15 ratio         PTT - ( 22 Aug 2024 20:22 )  PTT:29.7 sec  LIVER FUNCTIONS - ( 23 Aug 2024 11:20 )  Alb: 3.9 g/dL / Pro: 6.5 g/dL / ALK PHOS: 100 U/L / ALT: 86 U/L / AST: 129 U/L / GGT: x             RADIOLOGY & ADDITIONAL STUDIES:    < from: CT Abdomen and Pelvis w/ IV Cont (08.22.24 @ 23:37) >    LOWER CHEST: Please see chest CT report for chest findings.    HEPATOBILIARY: There is mucosal enhancement of the gallbladder neck with   mild thickening, incompletely evaluated on this examination. Further   evaluation with right upper quadrant ultrasound is recommended. There is   fatty infiltration liver.    SPLEEN: Unremarkable..    PANCREAS: Unremarkable..    ADRENAL GLANDS: Unremarkable..    KIDNEYS: No hydronephrosis. Retroaortic left renal vein.    ABDOMINOPELVIC NODES: Unremarkable...    PELVIC ORGANS: Enlarged prostate.    PERITONEUM/MESENTERY/BOWEL: Small bilateral fat-containing inguinal   hernias. The appendix is unremarkable. There is no free air or   obstruction. There is wall thickening of the cecum and a portion of the   ascending colon. Further evaluation colonoscopy is recommended..    BONES/SOFT TISSUES: Degenerative change...    IMPRESSION:    Wall thickening of the cecum and ascending colon. Further evaluation with   colonoscopy is recommended.    Mucosal enhancement and wall thickening the gallbladder neck.    Further evaluation with right upper quadrant ultrasound is recommended.    No abdominal or pelvic lymphadenopathy    Please see chest CT report for chest findings.    --- End of Report ---            JAC MEYER MD; Attending Radiologist  This document has been electronically signed. Aug 23 2024  8:31AM    < end of copied text >  < from: CT Chest w/ IV Cont (08.22.24 @ 23:37) >    IMPRESSION:    No CT evidence for acute intrathoracic pathology or lymphadenopathy.    Upper lobe predominant mild emphysematous changes. Scattered   micronodules, nonspecific.    Please see separately dictated report CT abdomen and pelvis performed   concurrently for intra-abdominal findings.    --- End of Report ---    < end of copied text >  
Hematology Consult Note    HPI:    patient is a 58yo male PMH htn coming to ED for chest pain. reports left sided sharp chest pain x2 hrs PTA lasting a few seconds that resolved. pt had associated SOB and nausea that also resolved. chest pain and SOB are non-exertional, no exacerbating or relieving factors. pt reports following with cardiology Dr. Koo more than 1 year ago, had negative exercise stress test 2 years ago. denies abdominal pain, vomiting, diarrhea, back pain, leg pain/ swelling, recent travel, rash, fever    Allergies    No Known Allergies    Intolerances        MEDICATIONS  (STANDING):    MEDICATIONS  (PRN):      PAST MEDICAL & SURGICAL HISTORY:  HTN (hypertension)          FAMILY HISTORY:  Family history of atherosclerosis (Sibling)        SOCIAL HISTORY: drinks ETOH 1 Pint per day, smokes cigarette 1 PPD    REVIEW OF SYSTEMS:    CONSTITUTIONAL: No weakness, fevers or chills  EYES/ENT: No visual changes;  No vertigo or throat pain   NECK: No pain or stiffness  RESPIRATORY: No cough, wheezing, hemoptysis; No shortness of breath  CARDIOVASCULAR: unremarkable  GASTROINTESTINAL: No abdominal or epigastric pain. No nausea, vomiting, or hematemesis; No diarrhea or constipation. No melena or hematochezia.  GENITOURINARY: No dysuria, frequency or hematuria  NEUROLOGICAL: No numbness or weakness  SKIN: No itching, burning, rashes, or lesions   All other review of systems is negative unless indicated above.      Weight (kg): 79.4 (08-22 @ 09:52)    T(F): 98.3 (08-22-24 @ 09:52), Max: 98.3 (08-22-24 @ 09:52)  HR: 113 (08-22-24 @ 09:52)  BP: 149/93 (08-22-24 @ 09:52)  RR: 18 (08-22-24 @ 09:52)  SpO2: 99% (08-22-24 @ 09:52)  Wt(kg): --    GENERAL: NAD, well-developed  HEAD:  Atraumatic, Normocephalic  EYES: EOMI, PERRLA, conjunctiva and sclera clear  NECK: Supple, No JVD  CHEST/LUNG: Clear to auscultation bilaterally; No wheeze  HEART: Regular rate and rhythm; No murmurs, rubs, or gallops  ABDOMEN: Soft, Nontender, Nondistended; Bowel sounds present  EXTREMITIES:  2+ Peripheral Pulses, No clubbing, cyanosis, or edema  NEUROLOGY: non-focal  SKIN: No rashes or lesions                          17.3   6.67  )-----------( 8        ( 22 Aug 2024 12:27 )             49.3       08-22    140  |  96<L>  |  9<L>  ----------------------------<  135<H>  3.1<L>   |  29  |  0.9    Ca    8.6      22 Aug 2024 10:50    TPro  7.1  /  Alb  4.1  /  TBili  2.0<H>  /  DBili  x   /  AST  173<H>  /  ALT  100<H>  /  AlkPhos  119<H>  08-22

## 2024-08-23 NOTE — PROGRESS NOTE ADULT - ASSESSMENT
59y M pmh HTN, thrombocytopenia, alcohol use disorder pw chest pain admitted for acute thrombocytopenia c/b acute transfusion reaction.     #Acute on chronic thrombocytopenia  #Hx erythrocytosis   #Allergic transfusion reaction   - PLT 8 this admission, bsl <30k, no evidence of bleeding   - Hx hgb >18 w/ hct 50-55%, sEPO 4 in 2022  - prior spleen US 12cm 2022  - sp 1u platelet - stopped after 15mins after urticaria and hypotension, afebrile without respiratory distress, low suspicion for TRALI or TACO. Likely allergic  - fu b12, folate, iron panel, ldh, mma, haptoglobin, hepatitis panel, hiv, lipase, epo, jak2  - CT CAP w/ IV con: No CT evidence for acute intrathoracic pathology or lymphadenopathy. Upper lobe predominant mild emphysematous changes. Scattered micronodules, nonspecific. Wall thickening of the cecum and ascending colon. Further evaluation with  colonoscopy is recommended. Mucosal enhancement and wall thickening the gallbladder neck.  - benadryl 25mg IV q6hr PRN for urticaria  - monitor platelets for now pending reaction workup, no active bleeding at this time  - fu heme onc    #Chest pain   #Hx EMILIANO  #Active smoker  - chest pain w/ spontaneous resolution   - trop -ve, ecg sinus tachy, no wheezing or respiratory distress  - fw ghavami op - stress test -ve  - 30packyr smoker  - may be musculoskeletal in origin   - needs op pulm fu for Lung cancer screening  - monitor for chest pain     #Mixed pattern of liver injury   #Alcohol use disorder  # Etoh hepatitis MDF < 32  - Tbili 2.0, , AST//100 - suspect iso alcohol use  - drinks 1 pint liquor daily  - no organomegaly on exam   - RUQ US, trend LFTs, check coags   - hepatology eval per heme/onc      #HTN  - hold home BP meds iso hypotension   - confirm medication w/ pharmacy   - lisinopril 20mg qd, coreg 3.125 bid, amlodipine 10mg qd    # Misc  - DVT Prophylaxis: SCDs  - GI Prophylaxis: none  - Diet: Diet, Regular  - Activity: Activity - Ambulate as Tolerated  - Code Status: Full   - Dispo: MED/SURG

## 2024-08-23 NOTE — PATIENT PROFILE ADULT - HEALTH LITERACY
Pt awake and alert. Pt on RA, VSS.  in the waiting room. Pt with some c/o pain, denies nausea, tolerating PO. Pt meets criteria to be discharged from phase 1. no

## 2024-08-23 NOTE — CONSULT NOTE ADULT - ATTENDING COMMENTS
seen/ examined w /hemonc team;note reviewed; case discussed; agree w/plan
59 y o  M with AUD chronic severe thrombocytopenia admitted with chest pain seen for alcoholic hepatitis.   No bouts of jaundice.   No prior DUI or rehab  Drinks 1 pint alcohol per day for several years. Working. . Has 2 children. Brother support. Lives alone.   No icterus  Abdomen soft non tender  Hepatomegaly + No splenomegaly.   CT ? caudate hypertrophy    Acute alcoholic hepatitis   Chronic thrombocytopenia - no splenomegaly on imaging ? due to liver disease  Macrocytosis    No steroids low MDF  ? cirrhosis OP follow up  Work up for thrombocytopenia per hematology

## 2024-08-23 NOTE — PROGRESS NOTE ADULT - SUBJECTIVE AND OBJECTIVE BOX
YOHANNES SNYDER  59y  [facility] [address]      Patient is a 59y old  Male who presents with a chief complaint of thrombocytopenia (22 Aug 2024 19:25)      INTERVAL HPI/OVERNIGHT EVENTS:        REVIEW OF SYSTEMS:        FAMILY HISTORY:  Family history of atherosclerosis (Sibling)      T(C): 36.6 (08-23-24 @ 05:01), Max: 37.7 (08-22-24 @ 19:27)  HR: 85 (08-23-24 @ 05:01) (85 - 114)  BP: 132/85 (08-23-24 @ 05:01) (66/52 - 155/94)  RR: 18 (08-23-24 @ 05:01) (18 - 22)  SpO2: 97% (08-23-24 @ 05:01) (95% - 100%)  Wt(kg): --Vital Signs Last 24 Hrs  T(C): 36.6 (23 Aug 2024 05:01), Max: 37.7 (22 Aug 2024 19:27)  T(F): 97.9 (23 Aug 2024 05:01), Max: 99.8 (22 Aug 2024 19:27)  HR: 85 (23 Aug 2024 05:01) (85 - 114)  BP: 132/85 (23 Aug 2024 05:01) (66/52 - 155/94)  BP(mean): --  RR: 18 (23 Aug 2024 05:01) (18 - 22)  SpO2: 97% (23 Aug 2024 05:01) (95% - 100%)    Parameters below as of 23 Aug 2024 05:01  Patient On (Oxygen Delivery Method): room air        PHYSICAL EXAM:  GENERAL: NAD, well-groomed, well-developed  HEAD:  Atraumatic, Normocephalic  EYES: EOMI, PERRLA, conjunctiva and sclera clear  ENMT: No tonsillar erythema, exudates, or enlargement; Moist mucous membranes, Good dentition, No lesions  NECK: Supple, No JVD, Normal thyroid  NERVOUS SYSTEM:  Alert & Oriented X3, Good concentration; Motor Strength 5/5 B/L upper and lower extremities; DTRs 2+ intact and symmetric  PULM: Clear to auscultation bilaterally  CARDIAC: Regular rate and rhythm; No murmurs, rubs, or gallops  GI: Soft, Nontender, Nondistended; Bowel sounds present  EXTREMITIES:  2+ Peripheral Pulses, No clubbing, cyanosis, or edema  LYMPH: No lymphadenopathy noted  SKIN: No rashes or lesions    Consultant(s) Notes Reviewed:  [x ] YES  [ ] NO  Care Discussed with Consultants/Other Providers [ x] YES  [ ] NO    LABS:                            17.1   8.24  )-----------( 15       ( 22 Aug 2024 20:01 )             49.7   08-22    138  |  98  |  9<L>  ----------------------------<  151<H>  3.9   |  24  |  1.0    Ca    8.3<L>      22 Aug 2024 19:59  Phos  2.5     08-22  Mg     1.7     08-22    TPro  6.2  /  Alb  3.7  /  TBili  2.4<H>  /  DBili  0.7<H>  /  AST  154<H>  /  ALT  91<H>  /  AlkPhos  102  08-22            albuterol    0.083% 2.5 milliGRAM(s) Nebulizer every 6 hours PRN  diphenhydrAMINE Injectable 25 milliGRAM(s) IntraMuscular every 4 hours PRN  EPINEPHrine     1 mG/mL Injectable 0.3 milliGRAM(s) IntraMuscular once PRN  folic acid 1 milliGRAM(s) Oral daily  LORazepam   Injectable 2 milliGRAM(s) IV Push every 2 hours PRN  thiamine 100 milliGRAM(s) Oral daily      59y M pmh HTN, thrombocytopenia, alcohol use disorder pw chest pain admitted for acute thrombocytopenia c/b acute transfusion reaction.     1. Acute on chronic thrombocytopenia complicated by allergic reaction during platelets transfusion . Hx erythrocytosis   - Admit to medicine        - Iron profile :pending             - B12:pending              - Haptologbin:pending           - JAK2:pending      - EPO:pending   - PLT 8 this admission, bsl <30k, no evidence of bleeding   - Benadryl/Steroid   - sp 1u platelet - stopped after 15mins after urticaria and hypotension, afebrile without respiratory distress, low suspicion for TRALI or TACO  - CT CAP w/ IV con for malignancy eval  - hematology consult:  a) Please transfuse 1U of platelet and recheck CBC 1Hr post-transfusion  b) Check B12/folate, Iron studies , LDH, serum MMA, Haptoglobin, hepatitis panel, HIV, serum Lipase, serum EPO  c)  send molecular studies for JAK2  d)  Check CT chest/abdomen/pelvis w/ IV contrast  to r/o underlying malignancy or lymphadenopathy (wt loss, heavy smoker, and alcohol use)  e) Transfuse platelets if plt < 10K or if patient is bleeding    2. Chest pain . Hx EMILIANO. Active smoker  - chest pain w/ spontaneous resolution   - trop -ve, ecg sinus tachy, no wheezing or respiratory distress  - fw ghavami op - stress test -ve  - 30packyr smoker    3. Mixed pattern of liver injury . hx of Alcohol use disorder  -  Etoh hepatitis MDF < 32  - Tbili 2.0, , AST//100 - suspect iso alcohol use  - drinks 1 pint liquor daily  - no organomegaly on exam   - RUQ US:pending   - hepatology eval:pending     4. HTN  - Hold  lisinopril 20mg qd, coreg 3.125 bid, amlodipine 10mg qd due to hypotension       # Misc  - DVT Prophylaxis: SCDs  - GI Prophylaxis: none  - Diet: Diet, Regular  - Activity: Activity - Ambulate as Tolerated  - Code Status: Full   - Dispo: MED/SURG     YOHANNES SNYDER  59y  [facility] [address]      Patient is a 59y old  Male who presents with a chief complaint of thrombocytopenia (22 Aug 2024 19:25)      INTERVAL HPI/OVERNIGHT EVENTS:  Patient feels well  he has no complains  he understand that low pt is concerning and need inpatient tt  no other events noted         FAMILY HISTORY:  Family history of atherosclerosis (Sibling)      T(C): 36.6 (08-23-24 @ 05:01), Max: 37.7 (08-22-24 @ 19:27)  HR: 85 (08-23-24 @ 05:01) (85 - 114)  BP: 132/85 (08-23-24 @ 05:01) (66/52 - 155/94)  RR: 18 (08-23-24 @ 05:01) (18 - 22)  SpO2: 97% (08-23-24 @ 05:01) (95% - 100%)  Wt(kg): --Vital Signs Last 24 Hrs  T(C): 36.6 (23 Aug 2024 05:01), Max: 37.7 (22 Aug 2024 19:27)  T(F): 97.9 (23 Aug 2024 05:01), Max: 99.8 (22 Aug 2024 19:27)  HR: 85 (23 Aug 2024 05:01) (85 - 114)  BP: 132/85 (23 Aug 2024 05:01) (66/52 - 155/94)  BP(mean): --  RR: 18 (23 Aug 2024 05:01) (18 - 22)  SpO2: 97% (23 Aug 2024 05:01) (95% - 100%)    Parameters below as of 23 Aug 2024 05:01  Patient On (Oxygen Delivery Method): room air        PHYSICAL EXAM:  GENERAL: NAD, well-groomed, well-developed  NERVOUS SYSTEM:  Alert & Oriented X3,  PULM: Clear to auscultation bilaterally  CARDIAC: Regular rate and rhythm;   GI: Soft, Nontender, Nondistended; Bowel sounds present  EXTREMITIES:  2+ Peripheral Pulses,    Consultant(s) Notes Reviewed:  [x ] YES  [ ] NO  Care Discussed with Consultants/Other Providers [ x] YES  [ ] NO    LABS:                            17.1   8.24  )-----------( 15       ( 22 Aug 2024 20:01 )             49.7   08-22    138  |  98  |  9<L>  ----------------------------<  151<H>  3.9   |  24  |  1.0    Ca    8.3<L>      22 Aug 2024 19:59  Phos  2.5     08-22  Mg     1.7     08-22    TPro  6.2  /  Alb  3.7  /  TBili  2.4<H>  /  DBili  0.7<H>  /  AST  154<H>  /  ALT  91<H>  /  AlkPhos  102  08-22            albuterol    0.083% 2.5 milliGRAM(s) Nebulizer every 6 hours PRN  diphenhydrAMINE Injectable 25 milliGRAM(s) IntraMuscular every 4 hours PRN  EPINEPHrine     1 mG/mL Injectable 0.3 milliGRAM(s) IntraMuscular once PRN  folic acid 1 milliGRAM(s) Oral daily  LORazepam   Injectable 2 milliGRAM(s) IV Push every 2 hours PRN  thiamine 100 milliGRAM(s) Oral daily      59y M pmh HTN, thrombocytopenia, alcohol use disorder pw chest pain admitted for acute thrombocytopenia c/b acute transfusion reaction.     1. Acute on chronic thrombocytopenia complicated by allergic reaction during platelets transfusion . Hx erythrocytosis   - Admit to medicine        - Iron profile :pending             - B12:pending              - Haptologbin:pending           - JAK2:pending      - EPO:pending   - PLT 8 this admission, bsl <30k, no evidence of bleeding   - Cont Steroid  - Start IVIG   - sp 1u platelet - stopped after 15mins after urticaria and hypotension, afebrile without respiratory distress, low suspicion for TRALI or TACO  - CT CAP w/ IV con for malignancy eval:  a) Wall thickening of the cecum and ascending colon. Further evaluation with colonoscopy is recommended.  b) Mucosal enhancement and wall thickening the gallbladder neck.  - hematology consult:  a) Please transfuse 1U of platelet and recheck CBC 1Hr post-transfusion  b) Check B12/folate, Iron studies , LDH, serum MMA, Haptoglobin, hepatitis panel, HIV, serum Lipase, serum EPO  c)  send molecular studies for JAK2  d)  Check CT chest/abdomen/pelvis w/ IV contrast  to r/o underlying malignancy or lymphadenopathy (wt loss, heavy smoker, and alcohol use)  e) Transfuse platelets if plt < 10K or if patient is bleeding    2. Chest pain . Hx EMILIANO. Active smoker  - chest pain w/ spontaneous resolution   - trop -ve, ecg sinus tachy, no wheezing or respiratory distress  - fw ghavami op - stress test -ve  - 30 pack/yr smoker    3. Mixed pattern of liver injury . hx of Alcohol use disorder  -  Etoh hepatitis MDF < 32  - Tbili 2.0, , AST//100 - suspect iso alcohol use  - drinks 1 pint liquor daily  - no organomegaly on exam   - RUQ US:pending   - hepatology eval:pending     4. HTN  - Hold  lisinopril 20mg qd, coreg 3.125 bid, amlodipine 10mg qd due to hypotension     5. Smoker  - Refuse nicotine patch     # Misc  - DVT Prophylaxis: SCDs  - GI Prophylaxis: none  - Diet: Diet, Regular  - Activity: Activity - Ambulate as Tolerated  - Code Status: Full   - Dispo: MED/SURG

## 2024-08-23 NOTE — CONSULT NOTE ADULT - ASSESSMENT
59y M pmh HTN, thrombocytopenia pw chest pain. Hepatology was consulted for etoh hepatitis.    Etoh hepatitis, MDF < 32   Thickening of the cecum and ascending colon wall.   Thrombocytopenia     PLAN   Alcohol cessation   Watch for withdrawal   Thiamine and folate   Correct electrolytes   Trend LFT   Hepatitis serology   RUQ US   Follow up with hepatology as outpatient.          59y M pmh HTN, thrombocytopenia pw chest pain. Hepatology was consulted for etoh hepatitis.    Etoh hepatitis, MDF < 32   Thickening of the cecum and ascending colon wall.   Thrombocytopenia     PLAN   Alcohol cessation   Watch for withdrawal   Thiamine and folate   Correct electrolytes   Trend LFT   Hepatitis serology   RUQ US   Follow up with hepatology as outpatient.   Colonoscopy as outpatient.

## 2024-08-24 LAB
ALBUMIN SERPL ELPH-MCNC: 3.7 G/DL — SIGNIFICANT CHANGE UP (ref 3.5–5.2)
ALP SERPL-CCNC: 101 U/L — SIGNIFICANT CHANGE UP (ref 30–115)
ALT FLD-CCNC: 83 U/L — HIGH (ref 0–41)
ANION GAP SERPL CALC-SCNC: 15 MMOL/L — HIGH (ref 7–14)
AST SERPL-CCNC: 108 U/L — HIGH (ref 0–41)
BASOPHILS # BLD AUTO: 0 K/UL — SIGNIFICANT CHANGE UP (ref 0–0.2)
BASOPHILS NFR BLD AUTO: 0 % — SIGNIFICANT CHANGE UP (ref 0–1)
BILIRUB SERPL-MCNC: 1.5 MG/DL — HIGH (ref 0.2–1.2)
BUN SERPL-MCNC: 14 MG/DL — SIGNIFICANT CHANGE UP (ref 10–20)
CALCIUM SERPL-MCNC: 8.6 MG/DL — SIGNIFICANT CHANGE UP (ref 8.4–10.5)
CHLORIDE SERPL-SCNC: 97 MMOL/L — LOW (ref 98–110)
CO2 SERPL-SCNC: 23 MMOL/L — SIGNIFICANT CHANGE UP (ref 17–32)
CREAT SERPL-MCNC: 0.8 MG/DL — SIGNIFICANT CHANGE UP (ref 0.7–1.5)
EGFR: 101 ML/MIN/1.73M2 — SIGNIFICANT CHANGE UP
EOSINOPHIL # BLD AUTO: 0 K/UL — SIGNIFICANT CHANGE UP (ref 0–0.7)
EOSINOPHIL NFR BLD AUTO: 0 % — SIGNIFICANT CHANGE UP (ref 0–8)
FERRITIN SERPL-MCNC: 2973 NG/ML — HIGH (ref 30–400)
FOLATE SERPL-MCNC: 7.1 NG/ML — SIGNIFICANT CHANGE UP
GLUCOSE SERPL-MCNC: 228 MG/DL — HIGH (ref 70–99)
HAPTOGLOB SERPL-MCNC: 95 MG/DL — SIGNIFICANT CHANGE UP (ref 34–200)
HAV IGM SER-ACNC: SIGNIFICANT CHANGE UP
HBV CORE IGM SER-ACNC: SIGNIFICANT CHANGE UP
HBV SURFACE AG SER-ACNC: SIGNIFICANT CHANGE UP
HCT VFR BLD CALC: 46.6 % — SIGNIFICANT CHANGE UP (ref 42–52)
HCV AB S/CO SERPL IA: 0.11 S/CO — SIGNIFICANT CHANGE UP (ref 0–0.99)
HCV AB SERPL-IMP: SIGNIFICANT CHANGE UP
HGB BLD-MCNC: 15.9 G/DL — SIGNIFICANT CHANGE UP (ref 14–18)
HIV 1+2 AB+HIV1 P24 AG SERPL QL IA: SIGNIFICANT CHANGE UP
IMM GRANULOCYTES NFR BLD AUTO: 0.4 % — HIGH (ref 0.1–0.3)
LYMPHOCYTES # BLD AUTO: 0.29 K/UL — LOW (ref 1.2–3.4)
LYMPHOCYTES # BLD AUTO: 6.4 % — LOW (ref 20.5–51.1)
MAGNESIUM SERPL-MCNC: 1.7 MG/DL — LOW (ref 1.8–2.4)
MCHC RBC-ENTMCNC: 34.1 G/DL — SIGNIFICANT CHANGE UP (ref 32–37)
MCHC RBC-ENTMCNC: 34.4 PG — HIGH (ref 27–31)
MCV RBC AUTO: 100.9 FL — HIGH (ref 80–94)
MONOCYTES # BLD AUTO: 0.2 K/UL — SIGNIFICANT CHANGE UP (ref 0.1–0.6)
MONOCYTES NFR BLD AUTO: 4.4 % — SIGNIFICANT CHANGE UP (ref 1.7–9.3)
NEUTROPHILS # BLD AUTO: 4.01 K/UL — SIGNIFICANT CHANGE UP (ref 1.4–6.5)
NEUTROPHILS NFR BLD AUTO: 88.8 % — HIGH (ref 42.2–75.2)
NRBC # BLD: 0 /100 WBCS — SIGNIFICANT CHANGE UP (ref 0–0)
PHOSPHATE SERPL-MCNC: 3.4 MG/DL — SIGNIFICANT CHANGE UP (ref 2.1–4.9)
PLATELET # BLD AUTO: 27 K/UL — LOW (ref 130–400)
PMV BLD: 11.6 FL — HIGH (ref 7.4–10.4)
POTASSIUM SERPL-MCNC: 4.3 MMOL/L — SIGNIFICANT CHANGE UP (ref 3.5–5)
POTASSIUM SERPL-SCNC: 4.3 MMOL/L — SIGNIFICANT CHANGE UP (ref 3.5–5)
PROT SERPL-MCNC: 8.8 G/DL — HIGH (ref 6–8)
RBC # BLD: 4.62 M/UL — LOW (ref 4.7–6.1)
RBC # FLD: 13.2 % — SIGNIFICANT CHANGE UP (ref 11.5–14.5)
SODIUM SERPL-SCNC: 135 MMOL/L — SIGNIFICANT CHANGE UP (ref 135–146)
VIT B12 SERPL-MCNC: 342 PG/ML — SIGNIFICANT CHANGE UP (ref 232–1245)
WBC # BLD: 4.52 K/UL — LOW (ref 4.8–10.8)
WBC # FLD AUTO: 4.52 K/UL — LOW (ref 4.8–10.8)

## 2024-08-24 PROCEDURE — 76705 ECHO EXAM OF ABDOMEN: CPT | Mod: 26

## 2024-08-24 PROCEDURE — 99232 SBSQ HOSP IP/OBS MODERATE 35: CPT

## 2024-08-24 RX ORDER — PANTOPRAZOLE SODIUM 40 MG
40 TABLET, DELAYED RELEASE (ENTERIC COATED) ORAL
Refills: 0 | Status: DISCONTINUED | OUTPATIENT
Start: 2024-08-24 | End: 2024-08-25

## 2024-08-24 RX ADMIN — Medication 80 MILLIGRAM(S): at 06:39

## 2024-08-24 RX ADMIN — Medication 40 MILLIGRAM(S): at 10:21

## 2024-08-24 RX ADMIN — IMMUNE GLOBULIN (HUMAN) 375 GRAM(S): 10 INJECTION INTRAVENOUS; SUBCUTANEOUS at 12:08

## 2024-08-24 RX ADMIN — Medication 100 MILLIGRAM(S): at 11:30

## 2024-08-24 RX ADMIN — Medication 1 MILLIGRAM(S): at 11:30

## 2024-08-24 RX ADMIN — SODIUM PHOSPHATE, DIBASIC, ANHYDROUS, POTASSIUM PHOSPHATE, MONOBASIC, AND SODIUM PHOSPHATE, MONOBASIC, MONOHYDRATE 1 PACKET(S): 852; 155; 130 TABLET, COATED ORAL at 06:39

## 2024-08-24 NOTE — PROGRESS NOTE ADULT - SUBJECTIVE AND OBJECTIVE BOX
YOHANNES SNYDER 59y Male  MRN#: [id number]   Hospital Day: 2d    SUBJECTIVE  Patient is a 59y old Male who presents with a chief complaint of thrombocytopenia (24 Aug 2024 08:51)  Currently admitted to medicine with the primary diagnosis of Thrombocytopenia      INTERVAL HPI AND OVERNIGHT EVENTS:  Patient was examined and seen at bedside. This morning he is resting comfortably in bed and reports no issues or overnight events.    REVIEW OF SYMPTOMS:  CONSTITUTIONAL: No weakness, fevers or chills; No headaches  EYES: No visual changes, eye pain, or discharge  ENT: No vertigo; No ear pain or change in hearing; No sore throat or difficulty swallowing  NECK: No pain or stiffness  RESPIRATORY: No cough, wheezing, or hemoptysis; No shortness of breath  CARDIOVASCULAR: No chest pain or palpitations  GASTROINTESTINAL: No abdominal or epigastric pain; No nausea, vomiting, or hematemesis; No diarrhea or constipation; No melena or hematochezia  GENITOURINARY: No dysuria, frequency or hematuria  MUSCULOSKELETAL: No joint pain, no muscle pain, no weakness  NEUROLOGICAL: No numbness or weakness  SKIN: No itching or rashes    OBJECTIVE  PAST MEDICAL & SURGICAL HISTORY  HTN (hypertension)      ALLERGIES:  No Known Allergies    MEDICATIONS:  STANDING MEDICATIONS  folic acid 1 milliGRAM(s) Oral daily  immune   globulin 10% (GAMMAGARD) IVPB 75 Gram(s) IV Intermittent daily  pantoprazole    Tablet 40 milliGRAM(s) Oral before breakfast  predniSONE   Tablet 80 milliGRAM(s) Oral daily  thiamine 100 milliGRAM(s) Oral daily    PRN MEDICATIONS  albuterol    0.083% 2.5 milliGRAM(s) Nebulizer every 6 hours PRN  diphenhydrAMINE Injectable 25 milliGRAM(s) IntraMuscular every 4 hours PRN  EPINEPHrine     1 mG/mL Injectable 0.3 milliGRAM(s) IntraMuscular once PRN  LORazepam   Injectable 2 milliGRAM(s) IV Push every 2 hours PRN      VITAL SIGNS: Last 24 Hours  T(C): 37 (24 Aug 2024 13:26), Max: 37 (24 Aug 2024 13:26)  T(F): 98.6 (24 Aug 2024 13:26), Max: 98.6 (24 Aug 2024 13:26)  HR: 97 (24 Aug 2024 13:26) (70 - 97)  BP: 134/80 (24 Aug 2024 13:26) (122/71 - 138/84)  BP(mean): --  RR: 18 (24 Aug 2024 13:26) (18 - 18)  SpO2: 97% (24 Aug 2024 13:26) (95% - 98%)    LABS:                        15.9   4.52  )-----------( 27       ( 24 Aug 2024 10:36 )             46.6     08-24    135  |  97<L>  |  14  ----------------------------<  228<H>  4.3   |  23  |  0.8    Ca    8.6      24 Aug 2024 10:36  Phos  3.4     08-24  Mg     1.7     08-24    TPro  8.8<H>  /  Alb  3.7  /  TBili  1.5<H>  /  DBili  x   /  AST  108<H>  /  ALT  83<H>  /  AlkPhos  101  08-24    PT/INR - ( 22 Aug 2024 20:22 )   PT: 13.10 sec;   INR: 1.15 ratio         PTT - ( 22 Aug 2024 20:22 )  PTT:29.7 sec  Urinalysis Basic - ( 24 Aug 2024 10:36 )    Color: x / Appearance: x / SG: x / pH: x  Gluc: 228 mg/dL / Ketone: x  / Bili: x / Urobili: x   Blood: x / Protein: x / Nitrite: x   Leuk Esterase: x / RBC: x / WBC x   Sq Epi: x / Non Sq Epi: x / Bacteria: x                RADIOLOGY:      PHYSICAL EXAM:  CONSTITUTIONAL: No acute distress, well-developed, well-groomed, AAOx3  HEAD: Atraumatic, normocephalic  EYES: EOM intact, PERRLA, conjunctiva and sclera clear  ENT: Supple, no masses, no thyromegaly, no bruits, no JVD; moist mucous membranes  PULMONARY: Clear to auscultation bilaterally; no wheezes, rales, or rhonchi  CARDIOVASCULAR: Regular rate and rhythm; no murmurs, rubs, or gallops  GASTROINTESTINAL: Soft, non-tender, non-distended; bowel sounds present  MUSCULOSKELETAL: 2+ peripheral pulses; no clubbing, no cyanosis, no edema  NEUROLOGY: non-focal  SKIN: No rashes or lesions; warm and dry    ASSESSMENT & PLAN  Patient is a 58yo male PMH htn coming to ED for chest pain. reports left sided sharp chest pain x2 hrs PTA lasting a few seconds that resolved. pt had associated SOB and nausea that also resolved. chest pain and SOB are non-exertional, no exacerbating or relieving factors. pt reports following with cardiology Dr. Koo more than 1 year ago, had negative exercise stress test 2 years ago.    Heme onc consulted for initial CBC showing plt count 8K      # Suspected ITP  # Chronic thrombocytopenia  # Hx of erythrocytosis r/o MPN  # Hx of EMILIANO  - previous labs reviewed , Plt count < 30 K for last 3 years. Plt count 8K (confirmed on peripheral smear)  - No evidence of increased bleeding (gum bleeding, node bleed, easy bruising)  - Erythrocytosis, Hb > 18 g/dl previously with Hct 50-55%. sEPO ~ 4.0 in 2022  - US spleen in 2022 : Spleen size 12 cm   - Endorses weight loss 30 lbs over last 2 years  - Drinks 1 pint of alcohol daily, smokes 1 PPD for > 20 years  CT abdomen during this admission reviewed: no splenomegaly, has wall thickening in cecum and ascending colon, CT chest shows emphysema      # Alcohol use disorder  # Transaminitis r/o alcohol related  hepatitis  - AST/ /100, Bili 2.0    Recommendations:  - S/p 1 unit platelet transfusion platelet only increased from 8--15 and today it is 10  - Now ITP is high on differential  - C/W IVIG 1 gm/kg daily x two doses &  prednisone 1 mg/kg daily   -  add PPI with steroids  - Monitor CBC daily, platelet count improving   - Check B12/folate, Iron studies , LDH, serum MMA, Haptoglobin, hepatitis panel, HIV, serum Lipase, serum EPO  - Send molecular studies for JAK2  - Will need screening colonoscopy, likely outpatient, when platelet better  - Transfuse platelets only if patient is bleeding

## 2024-08-24 NOTE — PROGRESS NOTE ADULT - SUBJECTIVE AND OBJECTIVE BOX
YOHANNES SNYDER  59y  [facility] 3B 012 B      Patient is a 59y old  Male who presents with a chief complaint of thrombocytopenia (22 Aug 2024 19:25)      INTERVAL HPI/OVERNIGHT EVENTS:  Patient feels well  he has no complains  he understand that low pt is concerning and need inpatient tt  no other events noted         FAMILY HISTORY:  Family history of atherosclerosis (Sibling)      T(C): 36.6 (08-23-24 @ 05:01), Max: 37.7 (08-22-24 @ 19:27)  HR: 85 (08-23-24 @ 05:01) (85 - 114)  BP: 132/85 (08-23-24 @ 05:01) (66/52 - 155/94)  RR: 18 (08-23-24 @ 05:01) (18 - 22)  SpO2: 97% (08-23-24 @ 05:01) (95% - 100%)  Wt(kg): --Vital Signs Last 24 Hrs  T(C): 36.6 (23 Aug 2024 05:01), Max: 37.7 (22 Aug 2024 19:27)  T(F): 97.9 (23 Aug 2024 05:01), Max: 99.8 (22 Aug 2024 19:27)  HR: 85 (23 Aug 2024 05:01) (85 - 114)  BP: 132/85 (23 Aug 2024 05:01) (66/52 - 155/94)  BP(mean): --  RR: 18 (23 Aug 2024 05:01) (18 - 22)  SpO2: 97% (23 Aug 2024 05:01) (95% - 100%)    Parameters below as of 23 Aug 2024 05:01  Patient On (Oxygen Delivery Method): room air        PHYSICAL EXAM:  GENERAL: NAD, well-groomed, well-developed  NERVOUS SYSTEM:  Alert & Oriented X3,  PULM: Clear to auscultation bilaterally  CARDIAC: Regular rate and rhythm;   GI: Soft, Nontender, Nondistended; Bowel sounds present  EXTREMITIES:  2+ Peripheral Pulses,    Consultant(s) Notes Reviewed:  [x ] YES  [ ] NO  Care Discussed with Consultants/Other Providers [ x] YES  [ ] NO    LABS:                            17.1   8.24  )-----------( 15       ( 22 Aug 2024 20:01 )             49.7   08-22    138  |  98  |  9<L>  ----------------------------<  151<H>  3.9   |  24  |  1.0    Ca    8.3<L>      22 Aug 2024 19:59  Phos  2.5     08-22  Mg     1.7     08-22    TPro  6.2  /  Alb  3.7  /  TBili  2.4<H>  /  DBili  0.7<H>  /  AST  154<H>  /  ALT  91<H>  /  AlkPhos  102  08-22            albuterol    0.083% 2.5 milliGRAM(s) Nebulizer every 6 hours PRN  diphenhydrAMINE Injectable 25 milliGRAM(s) IntraMuscular every 4 hours PRN  EPINEPHrine     1 mG/mL Injectable 0.3 milliGRAM(s) IntraMuscular once PRN  folic acid 1 milliGRAM(s) Oral daily  LORazepam   Injectable 2 milliGRAM(s) IV Push every 2 hours PRN  thiamine 100 milliGRAM(s) Oral daily      59y M pmh HTN, thrombocytopenia, alcohol use disorder pw chest pain admitted for acute thrombocytopenia c/b acute transfusion reaction.     1. Acute on chronic thrombocytopenia complicated by allergic reaction during platelets transfusion . Hx erythrocytosis   - Admit to medicine        - Iron profile :Ferritin elevated              - B12:WNL             - Haptologbin:pending           - JAK2:pending      - EPO:pending   - PLT 8 this admission, bsl <30k, no evidence of bleeding   - Cont Steroid with PPI   - Cont IVIG  d:2/2   - sp 1u platelet - stopped after 15mins after urticaria and hypotension, afebrile without respiratory distress, low suspicion for TRALI or TACO  - CT CAP w/ IV con for malignancy eval:  a) Wall thickening of the cecum and ascending colon. Further evaluation with colonoscopy is recommended.  b) Mucosal enhancement and wall thickening the gallbladder neck.  - hematology consult:  a) S/p 1 unit platelet transfusion platelet only increased from 8--15 and today it is 10  b) Now ITP is high on differential  c) Recommend IVIG 1 gm/kg daily x two doses, prednisone 1 mg/kg daily . Also add PPI with steroids  e) Need colonoscopy as outpatient     2. Chest pain . Hx EMILIANO. Active smoker  - chest pain w/ spontaneous resolution   - trop -ve, ecg sinus tachy, no wheezing or respiratory distress  - fw ghavami op - stress test -ve  - 30 pack/yr smoker    3. Mixed pattern of liver injury . hx of Alcohol use disorder  -  Etoh hepatitis MDF < 32  - Tbili 2.0, , AST//100 - suspect iso alcohol use  - drinks 1 pint liquor daily  - no organomegaly on exam   - RUQ US:pending   - hepatology eval appreciated     4. HTN  - Hold  lisinopril 20mg qd, coreg 3.125 bid, amlodipine 10mg qd due to hypotension     5. Smoker  - Refuse nicotine patch     # Misc  - DVT Prophylaxis: SCDs  - GI Prophylaxis: none  - Diet: Diet, Regular  - Activity: Activity - Ambulate as Tolerated  - Code Status: Full   - Dispo: MED/SURG     YOHANNES SNYDER  59y  [facility] 3B 012 B      Patient is a 59y old  Male who presents with a chief complaint of thrombocytopenia (22 Aug 2024 19:25)      INTERVAL HPI/OVERNIGHT EVENTS:  Patient feels well  he has no complains  no bleeding overnight   no other events noted         FAMILY HISTORY:  Family history of atherosclerosis (Sibling)      T(C): 36.6 (08-23-24 @ 05:01), Max: 37.7 (08-22-24 @ 19:27)  HR: 85 (08-23-24 @ 05:01) (85 - 114)  BP: 132/85 (08-23-24 @ 05:01) (66/52 - 155/94)  RR: 18 (08-23-24 @ 05:01) (18 - 22)  SpO2: 97% (08-23-24 @ 05:01) (95% - 100%)  Wt(kg): --Vital Signs Last 24 Hrs  T(C): 36.6 (23 Aug 2024 05:01), Max: 37.7 (22 Aug 2024 19:27)  T(F): 97.9 (23 Aug 2024 05:01), Max: 99.8 (22 Aug 2024 19:27)  HR: 85 (23 Aug 2024 05:01) (85 - 114)  BP: 132/85 (23 Aug 2024 05:01) (66/52 - 155/94)  BP(mean): --  RR: 18 (23 Aug 2024 05:01) (18 - 22)  SpO2: 97% (23 Aug 2024 05:01) (95% - 100%)    Parameters below as of 23 Aug 2024 05:01  Patient On (Oxygen Delivery Method): room air        PHYSICAL EXAM:  GENERAL: NAD, well-groomed, well-developed  NERVOUS SYSTEM:  Alert & Oriented X3,  PULM: Clear to auscultation bilaterally  CARDIAC: Regular rate and rhythm;   GI: Soft, Nontender, Nondistended; Bowel sounds present  EXTREMITIES:  2+ Peripheral Pulses,    Consultant(s) Notes Reviewed:  [x ] YES  [ ] NO  Care Discussed with Consultants/Other Providers [ x] YES  [ ] NO    LABS:                            17.1   8.24  )-----------( 15       ( 22 Aug 2024 20:01 )             49.7   08-22    138  |  98  |  9<L>  ----------------------------<  151<H>  3.9   |  24  |  1.0    Ca    8.3<L>      22 Aug 2024 19:59  Phos  2.5     08-22  Mg     1.7     08-22    TPro  6.2  /  Alb  3.7  /  TBili  2.4<H>  /  DBili  0.7<H>  /  AST  154<H>  /  ALT  91<H>  /  AlkPhos  102  08-22            albuterol    0.083% 2.5 milliGRAM(s) Nebulizer every 6 hours PRN  diphenhydrAMINE Injectable 25 milliGRAM(s) IntraMuscular every 4 hours PRN  EPINEPHrine     1 mG/mL Injectable 0.3 milliGRAM(s) IntraMuscular once PRN  folic acid 1 milliGRAM(s) Oral daily  LORazepam   Injectable 2 milliGRAM(s) IV Push every 2 hours PRN  thiamine 100 milliGRAM(s) Oral daily      59y M pmh HTN, thrombocytopenia, alcohol use disorder pw chest pain admitted for acute thrombocytopenia c/b acute transfusion reaction.     1. Acute on chronic thrombocytopenia complicated by allergic reaction during platelets transfusion seems ITP . Hx erythrocytosis   - Admit to medicine        - Iron profile :Ferritin elevated              - B12:WNL             - Haptologbin:pending           - JAK2:pending      - EPO:pending   - PLT 8 this admission, bsl <30k, no evidence of bleeding   - Cont Steroid with PPI   - Cont IVIG  d:2/2   - sp 1u platelet - stopped after 15mins after urticaria and hypotension, afebrile without respiratory distress, low suspicion for TRALI or TACO  - CT CAP w/ IV con for malignancy eval:  a) Wall thickening of the cecum and ascending colon. Further evaluation with colonoscopy is recommended.  b) Mucosal enhancement and wall thickening the gallbladder neck.  - hematology consult:  a) S/p 1 unit platelet transfusion platelet only increased from 8--15 and today it is 10  b) Now ITP is high on differential  c) Recommend IVIG 1 gm/kg daily x two doses, prednisone 1 mg/kg daily . Also add PPI with steroids  e) Need colonoscopy as outpatient     2. Chest pain . Hx EMILIANO. Active smoker  - chest pain w/ spontaneous resolution   - trop -ve, ecg sinus tachy, no wheezing or respiratory distress  - fw [facility] op - stress test -ve  - 30 pack/yr smoker    3. Mixed pattern of liver injury . hx of Alcohol use disorder  -  Etoh hepatitis MDF < 32  - Tbili 2.0, , AST//100 - suspect iso alcohol use  - drinks 1 pint liquor daily  - no organomegaly on exam   - RUQ US:pending   - hepatology eval appreciated     4. HTN  - Hold  lisinopril 20mg qd, coreg 3.125 bid, amlodipine 10mg qd due to hypotension     5. Smoker  - Refuse nicotine patch     # Misc  - DVT Prophylaxis: SCDs  - GI Prophylaxis: none  - Diet: Diet, Regular  - Activity: Activity - Ambulate as Tolerated  - Code Status: Full   - Dispo: MED/SURG

## 2024-08-25 VITALS
DIASTOLIC BLOOD PRESSURE: 83 MMHG | TEMPERATURE: 98 F | SYSTOLIC BLOOD PRESSURE: 134 MMHG | OXYGEN SATURATION: 97 % | HEART RATE: 102 BPM | RESPIRATION RATE: 17 BRPM

## 2024-08-25 LAB
ANION GAP SERPL CALC-SCNC: 11 MMOL/L — SIGNIFICANT CHANGE UP (ref 7–14)
BUN SERPL-MCNC: 20 MG/DL — SIGNIFICANT CHANGE UP (ref 10–20)
CALCIUM SERPL-MCNC: 8.2 MG/DL — LOW (ref 8.4–10.5)
CHLORIDE SERPL-SCNC: 97 MMOL/L — LOW (ref 98–110)
CO2 SERPL-SCNC: 24 MMOL/L — SIGNIFICANT CHANGE UP (ref 17–32)
CREAT SERPL-MCNC: 0.8 MG/DL — SIGNIFICANT CHANGE UP (ref 0.7–1.5)
EGFR: 101 ML/MIN/1.73M2 — SIGNIFICANT CHANGE UP
GLUCOSE SERPL-MCNC: 199 MG/DL — HIGH (ref 70–99)
HCT VFR BLD CALC: 43.8 % — SIGNIFICANT CHANGE UP (ref 42–52)
HGB BLD-MCNC: 14.7 G/DL — SIGNIFICANT CHANGE UP (ref 14–18)
MCHC RBC-ENTMCNC: 33.6 G/DL — SIGNIFICANT CHANGE UP (ref 32–37)
MCHC RBC-ENTMCNC: 33.7 PG — HIGH (ref 27–31)
MCV RBC AUTO: 100.5 FL — HIGH (ref 80–94)
NRBC # BLD: 0 /100 WBCS — SIGNIFICANT CHANGE UP (ref 0–0)
PLATELET # BLD AUTO: 56 K/UL — LOW (ref 130–400)
PMV BLD: 11 FL — HIGH (ref 7.4–10.4)
POTASSIUM SERPL-MCNC: 3.2 MMOL/L — LOW (ref 3.5–5)
POTASSIUM SERPL-SCNC: 3.2 MMOL/L — LOW (ref 3.5–5)
RBC # BLD: 4.36 M/UL — LOW (ref 4.7–6.1)
RBC # FLD: 13.2 % — SIGNIFICANT CHANGE UP (ref 11.5–14.5)
SODIUM SERPL-SCNC: 132 MMOL/L — LOW (ref 135–146)
WBC # BLD: 6.63 K/UL — SIGNIFICANT CHANGE UP (ref 4.8–10.8)
WBC # FLD AUTO: 6.63 K/UL — SIGNIFICANT CHANGE UP (ref 4.8–10.8)

## 2024-08-25 PROCEDURE — 99232 SBSQ HOSP IP/OBS MODERATE 35: CPT

## 2024-08-25 PROCEDURE — 99239 HOSP IP/OBS DSCHRG MGMT >30: CPT

## 2024-08-25 RX ORDER — PANTOPRAZOLE SODIUM 40 MG
1 TABLET, DELAYED RELEASE (ENTERIC COATED) ORAL
Qty: 14 | Refills: 0
Start: 2024-08-25 | End: 2024-09-07

## 2024-08-25 RX ORDER — BENZONATATE 100 MG
100 CAPSULE ORAL ONCE
Refills: 0 | Status: COMPLETED | OUTPATIENT
Start: 2024-08-25 | End: 2024-08-25

## 2024-08-25 RX ORDER — PREDNISONE 10 MG
4 TABLET, DOSE PACK ORAL
Qty: 56 | Refills: 0
Start: 2024-08-25 | End: 2024-09-07

## 2024-08-25 RX ORDER — POTASSIUM CHLORIDE 10 MEQ
40 TABLET, EXT RELEASE, PARTICLES/CRYSTALS ORAL ONCE
Refills: 0 | Status: COMPLETED | OUTPATIENT
Start: 2024-08-25 | End: 2024-08-25

## 2024-08-25 RX ADMIN — Medication 40 MILLIEQUIVALENT(S): at 09:24

## 2024-08-25 RX ADMIN — Medication 80 MILLIGRAM(S): at 05:16

## 2024-08-25 RX ADMIN — Medication 100 MILLIGRAM(S): at 11:46

## 2024-08-25 RX ADMIN — Medication 40 MILLIGRAM(S): at 05:16

## 2024-08-25 RX ADMIN — Medication 100 MILLIGRAM(S): at 11:45

## 2024-08-25 RX ADMIN — Medication 1 MILLIGRAM(S): at 11:46

## 2024-08-25 NOTE — PROGRESS NOTE ADULT - SUBJECTIVE AND OBJECTIVE BOX
YOHANNES SNYDER 59y Male  MRN#: [id number]   Hospital Day: 3d    SUBJECTIVE  Patient is a 59y old Male who presents with a chief complaint of thrombocytopenia (25 Aug 2024 09:01)  Currently admitted to medicine with the primary diagnosis of Thrombocytopenia      INTERVAL HPI AND OVERNIGHT EVENTS:  Patient was examined and seen at bedside. This morning he is resting comfortably in bed and reports no issues or overnight events.    REVIEW OF SYMPTOMS:  CONSTITUTIONAL: No weakness, fevers or chills; No headaches  EYES: No visual changes, eye pain, or discharge  ENT: No vertigo; No ear pain or change in hearing; No sore throat or difficulty swallowing  NECK: No pain or stiffness  RESPIRATORY: No cough, wheezing, or hemoptysis; No shortness of breath  CARDIOVASCULAR: No chest pain or palpitations  GASTROINTESTINAL: No abdominal or epigastric pain; No nausea, vomiting, or hematemesis; No diarrhea or constipation; No melena or hematochezia  GENITOURINARY: No dysuria, frequency or hematuria  MUSCULOSKELETAL: No joint pain, no muscle pain, no weakness  NEUROLOGICAL: No numbness or weakness  SKIN: No itching or rashes    OBJECTIVE  PAST MEDICAL & SURGICAL HISTORY  HTN (hypertension)      ALLERGIES:  No Known Allergies    MEDICATIONS:  STANDING MEDICATIONS  benzonatate 100 milliGRAM(s) Oral once  folic acid 1 milliGRAM(s) Oral daily  immune   globulin 10% (GAMMAGARD) IVPB 75 Gram(s) IV Intermittent daily  pantoprazole    Tablet 40 milliGRAM(s) Oral before breakfast  predniSONE   Tablet 80 milliGRAM(s) Oral daily  thiamine 100 milliGRAM(s) Oral daily    PRN MEDICATIONS  albuterol    0.083% 2.5 milliGRAM(s) Nebulizer every 6 hours PRN  diphenhydrAMINE Injectable 25 milliGRAM(s) IntraMuscular every 4 hours PRN  EPINEPHrine     1 mG/mL Injectable 0.3 milliGRAM(s) IntraMuscular once PRN  LORazepam   Injectable 2 milliGRAM(s) IV Push every 2 hours PRN      VITAL SIGNS: Last 24 Hours  T(C): 36.3 (25 Aug 2024 04:10), Max: 37 (24 Aug 2024 13:26)  T(F): 97.4 (25 Aug 2024 04:10), Max: 98.6 (24 Aug 2024 13:26)  HR: 84 (25 Aug 2024 04:10) (73 - 97)  BP: 147/84 (25 Aug 2024 04:10) (127/69 - 147/84)  BP(mean): --  RR: 18 (25 Aug 2024 04:10) (18 - 18)  SpO2: 97% (25 Aug 2024 08:15) (97% - 97%)    LABS:                        14.7   6.63  )-----------( 56       ( 25 Aug 2024 07:49 )             43.8     08-25    132<L>  |  97<L>  |  20  ----------------------------<  199<H>  3.2<L>   |  24  |  0.8    Ca    8.2<L>      25 Aug 2024 07:49  Phos  3.4     08-24  Mg     1.7     08-24    TPro  8.8<H>  /  Alb  3.7  /  TBili  1.5<H>  /  DBili  x   /  AST  108<H>  /  ALT  83<H>  /  AlkPhos  101  08-24      Urinalysis Basic - ( 25 Aug 2024 07:49 )    Color: x / Appearance: x / SG: x / pH: x  Gluc: 199 mg/dL / Ketone: x  / Bili: x / Urobili: x   Blood: x / Protein: x / Nitrite: x   Leuk Esterase: x / RBC: x / WBC x   Sq Epi: x / Non Sq Epi: x / Bacteria: x                RADIOLOGY:      PHYSICAL EXAM:  CONSTITUTIONAL: No acute distress, well-developed, well-groomed, AAOx3  HEAD: Atraumatic, normocephalic  EYES: EOM intact, PERRLA, conjunctiva and sclera clear  ENT: Supple, no masses, no thyromegaly, no bruits, no JVD; moist mucous membranes  PULMONARY: Clear to auscultation bilaterally; no wheezes, rales, or rhonchi  CARDIOVASCULAR: Regular rate and rhythm; no murmurs, rubs, or gallops  GASTROINTESTINAL: Soft, non-tender, non-distended; bowel sounds present  MUSCULOSKELETAL: 2+ peripheral pulses; no clubbing, no cyanosis, no edema  NEUROLOGY: non-focal  SKIN: No rashes or lesions; warm and dry    ASSESSMENT & PLAN  Patient is a 58yo male PMH htn coming to ED for chest pain. reports left sided sharp chest pain x2 hrs PTA lasting a few seconds that resolved. pt had associated SOB and nausea that also resolved. chest pain and SOB are non-exertional, no exacerbating or relieving factors. pt reports following with cardiology Dr. Koo more than 1 year ago, had negative exercise stress test 2 years ago.    Heme onc consulted for initial CBC showing plt count 8K    # Suspected ITP  # Chronic thrombocytopenia  # Hx of erythrocytosis r/o MPN  # Hx of EMILIANO  - previous labs reviewed , Plt count < 30 K for last 3 years. Plt count 8K (confirmed on peripheral smear)  - No evidence of increased bleeding (gum bleeding, node bleed, easy bruising)  - Erythrocytosis, Hb > 18 g/dl previously with Hct 50-55%. sEPO ~ 4.0 in 2022  - US spleen in 2022 : Spleen size 12 cm   - Endorses weight loss 30 lbs over last 2 years  - Drinks 1 pint of alcohol daily, smokes 1 PPD for > 20 years  CT abdomen during this admission reviewed: no splenomegaly, has wall thickening in cecum and ascending colon, CT chest shows emphysema    # Alcohol use disorder  # Transaminitis r/o alcohol related  hepatitis  - AST/ /100, Bili 2.0    Recommendations:  - S/p 1 unit platelet transfusion platelet with initial increase in platelet then dropping again, given this pattern ITP is hoigh on differential   - s/p IVIG 1 gm/kg daily x two doses   - c/w prednisone 1 mg/kg daily   -  add PPI with steroids  - Monitor CBC daily, platelet count improving   - Check B12/folate, Iron studies , LDH, serum MMA, Haptoglobin, hepatitis panel, HIV, serum Lipase, serum EPO  - Send molecular studies for JAK2  - Will need screening colonoscopy, likely outpatient, when platelet better  - Transfuse platelets only if patient is bleeding  - Platlet count 56K today, okay to discharge from hematology prespective on same dose of steroids, will arrange follow up with Dr. Sims in the hematology clinic this week.

## 2024-08-25 NOTE — CHART NOTE - NSCHARTNOTEFT_GEN_A_CORE
Consult received for "MST Score =/>2." Upon chart review and per patient, patient with stable weight. Reports being the same weight of 175# for two years now and reports good appetite and intake of %. Pt appears well nourished at this time. Pt does not currently meet criteria for Protein Calorie Malnutrition risk per MST. RD remains available for assessment per protocol or as needed.      Carla Mcpherson x [phone] or Via TEAMS Consult received for "MST Score =/>2." Upon chart review and per patient, patient with stable weight. Reports being the same weight of 175# for two years now and reports good appetite and intake of %. RD provided education on good sources of Potassium.    Pt does not currently meet criteria for Protein Calorie Malnutrition risk per MST. RD remains available for assessment per protocol or as needed.      Carla Mcpherson x [phone] or Via TEAMS

## 2024-08-25 NOTE — PROGRESS NOTE ADULT - REASON FOR ADMISSION
thrombocytopenia

## 2024-08-25 NOTE — DISCHARGE NOTE PROVIDER - HOSPITAL COURSE
59y M pmh HTN, thrombocytopenia, alcohol use disorder pw chest pain admitted for acute thrombocytopenia c/b acute transfusion reaction.     1. Acute on chronic thrombocytopenia complicated by allergic reaction during platelets transfusion seems ITP . Hx erythrocytosis   - Admit to medicine        - Iron profile :Ferritin elevated              - B12:WNL             - Haptologbin:pending           - JAK2:pending      - EPO:pending   - PLT 8 this admission, bsl <30k, no evidence of bleeding   - Was on Steroid with PPI , DC on prednisone and PPI  - Received  IVIG  d:2/2   - sp 1u platelet - stopped after 15mins after urticaria and hypotension, afebrile without respiratory distress, low suspicion for TRALI or TACO  - CT CAP w/ IV con for malignancy eval:  a) Wall thickening of the cecum and ascending colon. Further evaluation with colonoscopy is recommended.  b) Mucosal enhancement and wall thickening the gallbladder neck.  - hematology consult:  a) S/p 1 unit platelet transfusion platelet only increased from 8--15 and today it is 10  b) Now ITP is high on differential  c) Recommend IVIG 1 gm/kg daily x two doses, prednisone 1 mg/kg daily . Also add PPI with steroids  e) Need colonoscopy as outpatient     2. Chest pain . Hx EMILIANO. Active smoker  - chest pain w/ spontaneous resolution   - trop -ve, ecg sinus tachy, no wheezing or respiratory distress  - fw ghavami op - stress test -ve  - 30 pack/yr smoker    3. Mixed pattern of liver injury . hx of Alcohol use disorder  -  Etoh hepatitis MDF < 32  - Tbili 2.0, , AST//100 - suspect iso alcohol use  - drinks 1 pint liquor daily  - no organomegaly on exam   - RUQ US:12 x 1 x 12.1 x 4.7 cm (volume 360 mL). Previously measured approximately volume of 385 mL. (spleen)   - hepatology eval appreciated     4. HTN  - Hold  lisinopril 20mg qd, coreg 3.125 bid, amlodipine 10mg qd due to hypotension . Resume carvedilol upon dc but keep holding amlodipine and lisinopril     5. Smoker  - Refuse nicotine patch

## 2024-08-25 NOTE — DISCHARGE NOTE PROVIDER - CARE PROVIDER_API CALL
Cory Taylor  Internal Medicine  N  MI MASSEY, [place],    Phone: [phone]  Fax: [phone]  Established Patient  Follow Up Time: 1 week    iKrit Sims  Medical Oncology  [address] [place] [id number]  Phone: [phone]  Fax: [phone]  Established Patient  Follow Up Time: 1 week

## 2024-08-25 NOTE — DISCHARGE NOTE NURSING/CASE MANAGEMENT/SOCIAL WORK - PATIENT PORTAL LINK FT
You can access the FollowMyHealth Patient Portal offered by [facility] by registering at the following website: [email or web address]. By joining [employer]’s FollowMyHealth portal, you will also be able to view your health information using other applications (apps) compatible with our system.

## 2024-08-25 NOTE — DISCHARGE NOTE PROVIDER - NSDCCPCAREPLAN_GEN_ALL_CORE_FT
PRINCIPAL DISCHARGE DIAGNOSIS  Diagnosis: Thrombocytopenia  Assessment and Plan of Treatment: Seems due homer ITP   - Take prednisone 80mg po daily until seen by hematology   - Take PPI daily with prednisone   - preferrably low salt low glucose diet  - Need colonoscopy as outpatient      SECONDARY DISCHARGE DIAGNOSES  Diagnosis: Hypotension  Assessment and Plan of Treatment: - Stop lisinopril   - Stop amlodipine   - Resume carvedilol

## 2024-08-25 NOTE — DISCHARGE NOTE PROVIDER - PROVIDER TOKENS
PROVIDER:[TOKEN:[[id number]],FOLLOWUP:[1 week],ESTABLISHEDPATIENT:[T]],PROVIDER:[TOKEN:[[id number]],FOLLOWUP:[1 week],ESTABLISHEDPATIENT:[T]]

## 2024-08-25 NOTE — PROGRESS NOTE ADULT - ATTENDING COMMENTS
seen examined w [facility] fellow  note reviewed  case discussed  agree w plan
seen/ examined w/ [facility] fellow;note reviewed; case discussed; agree w/ plan
seen/ examined w/[facility] team; note reviewed; case discussed; agree w/plan:  given the presence of an isolated thrombocytopenia, given the intensity of thrombocytopenia, given the decrease of platelets count following platelets transfusion which raises suspicion of rapid destruction of platelets by antibodies, strongly suspect immune thrombocytopenia. Doubt/ unlikely that liver disease would cause such degree of thrombocytopenia; even if it is, the count of platelets would be expected to stay following platelets transfusion if due to liver disease; further w/u (including b12/folate/ MMA, RF/ MAHAD, full viral panel ) is pending  recommend to start IVIG 1g/kg daily x2 and prednisone 1mg/kg daily; use PPI to prevent PUD

## 2024-08-25 NOTE — DISCHARGE NOTE PROVIDER - NSDCMRMEDTOKEN_GEN_ALL_CORE_FT
amLODIPine 10 mg oral tablet: 1 tab(s) orally once a day  carvedilol 3.125 mg oral tablet: 1 tab(s) orally every 12 hours  folic acid 1 mg oral tablet: 1 tab(s) orally once a day  lisinopril 20 mg oral tablet: 1 tab(s) orally once a day  thiamine 100 mg oral tablet: 1 tab(s) orally once a day   carvedilol 3.125 mg oral tablet: 1 tab(s) orally every 12 hours  folic acid 1 mg oral tablet: 1 tab(s) orally once a day  pantoprazole 40 mg oral delayed release tablet: 1 tab(s) orally once a day (before a meal)  predniSONE 20 mg oral tablet: 4 tab(s) orally once a day  thiamine 100 mg oral tablet: 1 tab(s) orally once a day

## 2024-08-25 NOTE — DISCHARGE NOTE PROVIDER - CARE PROVIDERS DIRECT ADDRESSES
,[email or web address]_Unknown,[email or web address]

## 2024-08-25 NOTE — DISCHARGE NOTE PROVIDER - ATTENDING DISCHARGE PHYSICAL EXAMINATION:
VITALS:   T(C): 36.3 (08-25-24 @ 04:10), Max: 37 (08-24-24 @ 13:26)  HR: 84 (08-25-24 @ 04:10) (73 - 97)  BP: 147/84 (08-25-24 @ 04:10) (127/69 - 147/84)  RR: 18 (08-25-24 @ 04:10) (18 - 18)  SpO2: 97% (08-25-24 @ 08:15) (97% - 97%)    GENERAL: NAD, lying in bed comfortably  HEART: Regular rate and rhythm,  LUNGS: Unlabored respirations.  Clear to auscultation bilaterally,  ABDOMEN: Soft, nontender, nondistended, +BS  EXTREMITIES: 2+ peripheral pulses bilaterally.  NERVOUS SYSTEM:  A&Ox3,

## 2024-08-26 ENCOUNTER — NON-APPOINTMENT (OUTPATIENT)
Age: 59
End: 2024-08-26

## 2024-08-27 LAB
EPO SERPL-MCNC: 2.9 MIU/ML — SIGNIFICANT CHANGE UP (ref 2.6–18.5)
METHYLMALONATE SERPL-SCNC: 252 NMOL/L — SIGNIFICANT CHANGE UP (ref 0–378)

## 2024-08-29 ENCOUNTER — LABORATORY RESULT (OUTPATIENT)
Age: 59
End: 2024-08-29

## 2024-08-29 ENCOUNTER — APPOINTMENT (OUTPATIENT)
Age: 59
End: 2024-08-29

## 2024-08-29 ENCOUNTER — OUTPATIENT (OUTPATIENT)
Dept: OUTPATIENT SERVICES | Facility: HOSPITAL | Age: 59
LOS: 1 days | End: 2024-08-29
Payer: COMMERCIAL

## 2024-08-29 VITALS
HEIGHT: 68 IN | SYSTOLIC BLOOD PRESSURE: 104 MMHG | WEIGHT: 163 LBS | TEMPERATURE: 98.1 F | RESPIRATION RATE: 16 BRPM | OXYGEN SATURATION: 99 % | DIASTOLIC BLOOD PRESSURE: 67 MMHG | HEART RATE: 87 BPM | BODY MASS INDEX: 24.71 KG/M2

## 2024-08-29 DIAGNOSIS — D69.6 THROMBOCYTOPENIA, UNSPECIFIED: ICD-10-CM

## 2024-08-29 LAB
HCT VFR BLD CALC: 44.5 %
HGB BLD-MCNC: 15.7 G/DL
MCHC RBC-ENTMCNC: 34.1 PG
MCHC RBC-ENTMCNC: 35.3 G/DL
MCV RBC AUTO: 96.5 FL
PLATELET # BLD AUTO: 155 K/UL
PMV BLD: 10.4 FL
RBC # BLD: 4.61 M/UL
RBC # FLD: 13.2 %
WBC # FLD AUTO: 4.73 K/UL

## 2024-08-29 PROCEDURE — 85027 COMPLETE CBC AUTOMATED: CPT

## 2024-08-29 PROCEDURE — 99215 OFFICE O/P EST HI 40 MIN: CPT

## 2024-08-29 RX ORDER — PANTOPRAZOLE 40 MG/1
40 TABLET, DELAYED RELEASE ORAL DAILY
Qty: 30 | Refills: 1 | Status: ACTIVE | COMMUNITY
Start: 2024-08-29 | End: 1900-01-01

## 2024-08-29 RX ORDER — PREDNISONE 10 MG/1
10 TABLET ORAL DAILY
Qty: 112 | Refills: 0 | Status: ACTIVE | COMMUNITY
Start: 2024-08-29 | End: 1900-01-01

## 2024-08-30 DIAGNOSIS — D69.6 THROMBOCYTOPENIA, UNSPECIFIED: ICD-10-CM

## 2024-08-30 LAB
JAK2 EXON 13 MUT ANL BLD/T: SIGNIFICANT CHANGE UP
REFLEX:: SIGNIFICANT CHANGE UP

## 2024-08-31 DIAGNOSIS — K70.10 ALCOHOLIC HEPATITIS WITHOUT ASCITES: ICD-10-CM

## 2024-08-31 DIAGNOSIS — T80.61XA: ICD-10-CM

## 2024-08-31 DIAGNOSIS — L50.8 OTHER URTICARIA: ICD-10-CM

## 2024-08-31 DIAGNOSIS — I10 ESSENTIAL (PRIMARY) HYPERTENSION: ICD-10-CM

## 2024-08-31 DIAGNOSIS — I95.9 HYPOTENSION, UNSPECIFIED: ICD-10-CM

## 2024-08-31 DIAGNOSIS — F17.210 NICOTINE DEPENDENCE, CIGARETTES, UNCOMPLICATED: ICD-10-CM

## 2024-08-31 DIAGNOSIS — R07.9 CHEST PAIN, UNSPECIFIED: ICD-10-CM

## 2024-08-31 DIAGNOSIS — I95.89 OTHER HYPOTENSION: ICD-10-CM

## 2024-08-31 DIAGNOSIS — D69.3 IMMUNE THROMBOCYTOPENIC PURPURA: ICD-10-CM

## 2024-08-31 DIAGNOSIS — D75.89 OTHER SPECIFIED DISEASES OF BLOOD AND BLOOD-FORMING ORGANS: ICD-10-CM

## 2024-08-31 DIAGNOSIS — D69.6 THROMBOCYTOPENIA, UNSPECIFIED: ICD-10-CM

## 2024-08-31 DIAGNOSIS — Z79.899 OTHER LONG TERM (CURRENT) DRUG THERAPY: ICD-10-CM

## 2024-08-31 DIAGNOSIS — F10.239 ALCOHOL DEPENDENCE WITH WITHDRAWAL, UNSPECIFIED: ICD-10-CM

## 2024-09-04 ENCOUNTER — OUTPATIENT (OUTPATIENT)
Dept: OUTPATIENT SERVICES | Facility: HOSPITAL | Age: 59
LOS: 1 days | End: 2024-09-04
Payer: COMMERCIAL

## 2024-09-04 ENCOUNTER — LABORATORY RESULT (OUTPATIENT)
Age: 59
End: 2024-09-04

## 2024-09-04 ENCOUNTER — APPOINTMENT (OUTPATIENT)
Age: 59
End: 2024-09-04
Payer: COMMERCIAL

## 2024-09-04 VITALS
SYSTOLIC BLOOD PRESSURE: 123 MMHG | HEIGHT: 68 IN | WEIGHT: 163 LBS | HEART RATE: 81 BPM | DIASTOLIC BLOOD PRESSURE: 75 MMHG | TEMPERATURE: 97.9 F | RESPIRATION RATE: 16 BRPM | OXYGEN SATURATION: 100 % | BODY MASS INDEX: 24.71 KG/M2

## 2024-09-04 DIAGNOSIS — D75.1 SECONDARY POLYCYTHEMIA: ICD-10-CM

## 2024-09-04 DIAGNOSIS — D69.6 THROMBOCYTOPENIA, UNSPECIFIED: ICD-10-CM

## 2024-09-04 PROBLEM — Z51.81 ENCOUNTER FOR MEDICATION MONITORING: Status: ACTIVE | Noted: 2024-08-29

## 2024-09-04 LAB
ALBUMIN SERPL ELPH-MCNC: 3.9 G/DL
ALP BLD-CCNC: 90 U/L
ALT SERPL-CCNC: 340 U/L
ANION GAP SERPL CALC-SCNC: 11 MMOL/L
AST SERPL-CCNC: 210 U/L
BILIRUB DIRECT SERPL-MCNC: 0.2 MG/DL
BILIRUB INDIRECT SERPL-MCNC: 0.3 MG/DL
BILIRUB SERPL-MCNC: 0.5 MG/DL
BUN SERPL-MCNC: 13 MG/DL
CALCIUM SERPL-MCNC: 8.9 MG/DL
CHLORIDE SERPL-SCNC: 98 MMOL/L
CO2 SERPL-SCNC: 31 MMOL/L
CREAT SERPL-MCNC: 1 MG/DL
EGFR: 86 ML/MIN/1.73M2
GLUCOSE SERPL-MCNC: 123 MG/DL
HCT VFR BLD CALC: 45.3 %
HGB BLD-MCNC: 16 G/DL
MCHC RBC-ENTMCNC: 34 PG
MCHC RBC-ENTMCNC: 35.3 G/DL
MCV RBC AUTO: 96.2 FL
PLATELET # BLD AUTO: 206 K/UL
PMV BLD: 9.8 FL
POTASSIUM SERPL-SCNC: 4.9 MMOL/L
PROT SERPL-MCNC: 7.7 G/DL
RBC # BLD: 4.71 M/UL
RBC # FLD: 13.1 %
SODIUM SERPL-SCNC: 140 MMOL/L
WBC # FLD AUTO: 9.31 K/UL

## 2024-09-04 PROCEDURE — 99214 OFFICE O/P EST MOD 30 MIN: CPT

## 2024-09-04 PROCEDURE — 85027 COMPLETE CBC AUTOMATED: CPT

## 2024-09-04 PROCEDURE — 80076 HEPATIC FUNCTION PANEL: CPT

## 2024-09-04 PROCEDURE — 80048 BASIC METABOLIC PNL TOTAL CA: CPT

## 2024-09-04 NOTE — REVIEW OF SYSTEMS
[Recent Change In Weight] : ~T recent weight change [Negative] : Heme/Lymph [FreeTextEntry2] : appetite is improving

## 2024-09-04 NOTE — ASSESSMENT
[FreeTextEntry1] : # ITP, recurrent - previous labs reviewed , Plt count < 30 K for last 3 years. Plt count 8K (confirmed on peripheral smear) - No evidence of increased bleeding (gum bleeding, node bleed, easy bruising) - US spleen in 2022 : Spleen size 12 cm - S/p 1 unit platelet transfusion platelet with initial increase in platelet then dropping again, given this pattern ITP is hoigh on differential - s/p IVIG 1 gm/kg daily x two doses inpatient in 08/2024  - c/w prednisone (1mg/kg) 65mg daily as of 9/5, initiated 08/2024 ; plan to decrease by increments of 5mg every 3-4 days for gradual taper, Rx sent + possible side effects discussed  - c/w PPI pantoprazole 40mg daily while on steroid taper, Rx sent + possible side effects discussed  - Transfuse platelets only if patient is bleeding  - Labwork today: CBC shows PLTs 206,000   # Alcohol use disorder # Transaminitis r/o alcohol related hepatitis - AST/ /100, Bili 2.0  - Drinks 1 pint of alcohol daily, smokes 1 PPD for > 20 years  - will consider MR in the near future once PLTs stabilize   Will need screening colonoscopy, when platelet better.   RTC in 1 week with SMART NP with CBC prior

## 2024-09-04 NOTE — CONSULT LETTER
[Dear  ___] : Dear  [unfilled], [Consult Letter:] : I had the pleasure of evaluating your patient, [unfilled]. [Please see my note below.] : Please see my note below. [Sincerely,] : Sincerely, [FreeTextEntry3] : Kirit Duran NP

## 2024-09-04 NOTE — HISTORY OF PRESENT ILLNESS
[Disease:__________________________] : Disease: [unfilled] [de-identified] : Mr. YOHANNES SNYDER is a 59 year old male here today for evaluation and management of ITP.    YOHANNES is a 59 year old M with PMHx including HTN, EtOH abuse, EMILIANO, who presents to clinic to establish care, accompanied by daughter at initial visit.  He was previously seen by Hematologist, Dr. Karimi back in 2022 for thrombocytopenia but has not had followup since.  He is presently feeling well with no new complaints.  Patient denies fever, chills, nausea, vomiting, dyspnea or bleeding.  Patient reports family history of malignancy in his mother (lung cancer, former smoker).  He is a current smoker.    LAB WORKUP (8.29.2024) WBC 4.73, Hgb 15.7,   (8.22.2024) WBC 6.27, Hgb 17.8, PLT 8  HCM Colonoscopy overdue Upper endoscopy never done  [de-identified] : 9/4/24 Patient is here for a follow-up visit for ITP.  Since last visit, patient has continued prednisone (1mg/kg) 70mg daily.  He temporarily stopped PPI.  Reviewed most recent CBC, which shows PLTs 206,000.  Patient denies significant bruising, dyspnea, hematuria, PRBRB or other overt bleeding.

## 2024-09-04 NOTE — HISTORY OF PRESENT ILLNESS
[Disease:__________________________] : Disease: [unfilled] [de-identified] : Mr. YOHANNES SNYDER is a 59 year old male here today for evaluation and management of ITP.    YOHANNES is a 59 year old M with PMHx including HTN, EtOH abuse, EMILIANO, who presents to clinic to establish care, accompanied by daughter at initial visit.  He was previously seen by Hematologist, Dr. Karimi back in 2022 for thrombocytopenia but has not had followup since.  He is presently feeling well with no new complaints.  Patient denies fever, chills, nausea, vomiting, dyspnea or bleeding.  Patient reports family history of malignancy in his mother (lung cancer, former smoker).  He is a current smoker.    LAB WORKUP (8.29.2024) WBC 4.73, Hgb 15.7,   (8.22.2024) WBC 6.27, Hgb 17.8, PLT 8  HCM Colonoscopy overdue Upper endoscopy never done  [de-identified] : 9/4/24 Patient is here for a follow-up visit for ITP.  Since last visit, patient has continued prednisone (1mg/kg) 70mg daily.  He temporarily stopped PPI.  Reviewed most recent CBC, which shows PLTs 206,000.  Patient denies significant bruising, dyspnea, hematuria, PRBRB or other overt bleeding.

## 2024-09-04 NOTE — BEGINNING OF VISIT
[0] : 2) Feeling down, depressed, or hopeless: Not at all (0) [[id number]] : 0 [Pain Scale: ___] : On a scale of 1-10, today the patient's pain is a(n) [unfilled]. [Current] : Current [Patient advised of risk of tobacco use and smoking cessation discussed.] : Patient advised of risk of tobacco use and smoking cessation discussed. [Date Discussed (MM/DD/YY): ___] : Discussed: [unfilled] [With Patient/Caregiver] : with Patient/Caregiver [Abdominal Pain] : no abdominal pain [Constipation] : no constipation [Diarrhea Character] : Diarrhea: Grade 0

## 2024-09-04 NOTE — BEGINNING OF VISIT
[0] : 2) Feeling down, depressed, or hopeless: Not at all (0) [[id number]] : 0 [Pain Scale: ___] : On a scale of 1-10, today the patient's pain is a(n) [unfilled]. [Current] : Current [Patient advised of risk of tobacco use and smoking cessation discussed.] : Patient advised of risk of tobacco use and smoking cessation discussed. [Date Discussed (MM/DD/YY): ___] : Discussed: [unfilled] [With Patient/Caregiver] : with Patient/Caregiver [Abdominal Pain] : no abdominal pain [Constipation] : no constipation [Diarrhea Character] : Diarrhea: Grade 0

## 2024-09-05 DIAGNOSIS — D69.6 THROMBOCYTOPENIA, UNSPECIFIED: ICD-10-CM

## 2024-09-09 ENCOUNTER — APPOINTMENT (OUTPATIENT)
Age: 59
End: 2024-09-09
Payer: COMMERCIAL

## 2024-09-09 ENCOUNTER — OUTPATIENT (OUTPATIENT)
Dept: OUTPATIENT SERVICES | Facility: HOSPITAL | Age: 59
LOS: 1 days | End: 2024-09-09
Payer: COMMERCIAL

## 2024-09-09 ENCOUNTER — LABORATORY RESULT (OUTPATIENT)
Age: 59
End: 2024-09-09

## 2024-09-09 VITALS
BODY MASS INDEX: 24.14 KG/M2 | HEART RATE: 100 BPM | WEIGHT: 163 LBS | OXYGEN SATURATION: 99 % | DIASTOLIC BLOOD PRESSURE: 71 MMHG | TEMPERATURE: 98.9 F | SYSTOLIC BLOOD PRESSURE: 113 MMHG | HEIGHT: 69 IN | RESPIRATION RATE: 16 BRPM

## 2024-09-09 DIAGNOSIS — R79.89 OTHER SPECIFIED ABNORMAL FINDINGS OF BLOOD CHEMISTRY: ICD-10-CM

## 2024-09-09 DIAGNOSIS — Z51.81 ENCOUNTER FOR THERAPEUTIC DRUG LVL MONITORING: ICD-10-CM

## 2024-09-09 DIAGNOSIS — D69.6 THROMBOCYTOPENIA, UNSPECIFIED: ICD-10-CM

## 2024-09-09 LAB
HCT VFR BLD CALC: 42 %
HGB BLD-MCNC: 14.3 G/DL
MCHC RBC-ENTMCNC: 33.4 PG
MCHC RBC-ENTMCNC: 34 G/DL
MCV RBC AUTO: 98.1 FL
PLATELET # BLD AUTO: 143 K/UL
PMV BLD: 10.3 FL
RBC # BLD: 4.28 M/UL
RBC # FLD: 13.2 %
WBC # FLD AUTO: 10.56 K/UL

## 2024-09-09 PROCEDURE — 80076 HEPATIC FUNCTION PANEL: CPT

## 2024-09-09 PROCEDURE — 80048 BASIC METABOLIC PNL TOTAL CA: CPT

## 2024-09-09 PROCEDURE — 85027 COMPLETE CBC AUTOMATED: CPT

## 2024-09-09 PROCEDURE — 99214 OFFICE O/P EST MOD 30 MIN: CPT

## 2024-09-09 NOTE — ASSESSMENT
[FreeTextEntry1] : # ITP, recurrent - previous labs reviewed , Plt count < 30 K for last 3 years. Plt count 8K (confirmed on peripheral smear) - No evidence of increased bleeding (gum bleeding, node bleed, easy bruising) - US spleen in 2022 : Spleen size 12 cm - S/p 1 unit platelet transfusion platelet with initial increase in platelet then dropping again, given this pattern ITP is hoigh on differential - s/p IVIG 1 gm/kg daily x two doses inpatient in 08/2024  - c/w prednisone (1mg/kg) 55mg daily as of 9/10, initiated 08/2024 ; plan to decrease by increments of 5mg every 3-4 days for gradual taper, Rx sent + possible side effects discussed  - c/w PPI pantoprazole 40mg daily while on steroid taper - Transfuse platelets only if patient is bleeding  - Labwork today: CBC shows PLTs 143,000 , BMP, LFTs STAT    # Alcohol use disorder  # Transaminitis r/o alcohol related hepatitis  - AST/ /100, Bili 2.0  - Drinks 1 pint of alcohol daily, smokes 1 PPD for > 20 years  - refer to Hepatologist, Dr. Patton, ASAP due to rise in LFTs; patient will likely require MR + Fibroscan; tasked SI Care Team for assistance with scheduling + Rx given - reiterated importance for complete EtOH cessation; patient is currently asymptomatic and denies EtOH use in the last 2+ weeks   Will need screening colonoscopy, when platelets stable.   Labwork on Mondays + Thursdays: CBC  RTC in 1 week with SMART NP with CBC prior

## 2024-09-09 NOTE — HISTORY OF PRESENT ILLNESS
[Disease:__________________________] : Disease: [unfilled] [de-identified] : Mr. YOHANNES SNYDER is a 59 year old male here today for evaluation and management of ITP.    YOHANNES is a 59 year old M with PMHx including HTN, EtOH abuse, EMILIANO, who presents to clinic to establish care, accompanied by daughter at initial visit.  He was previously seen by Hematologist, Dr. Karimi back in 2022 for thrombocytopenia but has not had followup since.  He is presently feeling well with no new complaints.  Patient denies fever, chills, nausea, vomiting, dyspnea or bleeding.  Patient reports family history of malignancy in his mother (lung cancer, former smoker).  He is a current smoker.    LAB WORKUP (8.29.2024) WBC 4.73, Hgb 15.7,   (8.22.2024) WBC 6.27, Hgb 17.8, PLT 8  HCM Colonoscopy overdue Upper endoscopy never done  [de-identified] : 9/4/24 Patient is here for a follow-up visit for ITP.  Since last visit, patient has continued prednisone (1mg/kg) 70mg daily.  He temporarily stopped PPI.  Reviewed most recent CBC, which shows PLTs 206,000.  Patient denies significant bruising, dyspnea, hematuria, PRBRB or other overt bleeding.    9/9/24 Patient is here for a follow-up visit for ITP, accompanied by daughter.  Patient has gradually decreased prednisone (started at 1mg/kg) down 60mg each morning for the last 4 days.  He continues to take PPI.  Reviewed most recent CBC, which shows PLTs 143,000.  Patient denies significant bruising, dyspnea, hematuria, PRBRB, abdominal pain, EtOH use, confusion, petechiae or other overt bleeding.  He reports some issues sleeping at night, which is likely due to high dose steroid; he will try to decrease environmental stimuli and see if sleeping pattern improves as we continue to gradually taper prednisone according to PLT response.

## 2024-09-09 NOTE — HISTORY OF PRESENT ILLNESS
[Disease:__________________________] : Disease: [unfilled] [de-identified] : Mr. YOHANNES SNYDER is a 59 year old male here today for evaluation and management of ITP.    YOHANNES is a 59 year old M with PMHx including HTN, EtOH abuse, EMILIANO, who presents to clinic to establish care, accompanied by daughter at initial visit.  He was previously seen by Hematologist, Dr. Karimi back in 2022 for thrombocytopenia but has not had followup since.  He is presently feeling well with no new complaints.  Patient denies fever, chills, nausea, vomiting, dyspnea or bleeding.  Patient reports family history of malignancy in his mother (lung cancer, former smoker).  He is a current smoker.    LAB WORKUP (8.29.2024) WBC 4.73, Hgb 15.7,   (8.22.2024) WBC 6.27, Hgb 17.8, PLT 8  HCM Colonoscopy overdue Upper endoscopy never done  [de-identified] : 9/4/24 Patient is here for a follow-up visit for ITP.  Since last visit, patient has continued prednisone (1mg/kg) 70mg daily.  He temporarily stopped PPI.  Reviewed most recent CBC, which shows PLTs 206,000.  Patient denies significant bruising, dyspnea, hematuria, PRBRB or other overt bleeding.    9/9/24 Patient is here for a follow-up visit for ITP, accompanied by daughter.  Patient has gradually decreased prednisone (started at 1mg/kg) down 60mg each morning for the last 4 days.  He continues to take PPI.  Reviewed most recent CBC, which shows PLTs 143,000.  Patient denies significant bruising, dyspnea, hematuria, PRBRB, abdominal pain, EtOH use, confusion, petechiae or other overt bleeding.  He reports some issues sleeping at night, which is likely due to high dose steroid; he will try to decrease environmental stimuli and see if sleeping pattern improves as we continue to gradually taper prednisone according to PLT response.

## 2024-09-09 NOTE — HISTORY OF PRESENT ILLNESS
[Disease:__________________________] : Disease: [unfilled] [de-identified] : Mr. YOHANNES SNYDER is a 59 year old male here today for evaluation and management of ITP.    YOHANNES is a 59 year old M with PMHx including HTN, EtOH abuse, EMILIANO, who presents to clinic to establish care, accompanied by daughter at initial visit.  He was previously seen by Hematologist, Dr. Karimi back in 2022 for thrombocytopenia but has not had followup since.  He is presently feeling well with no new complaints.  Patient denies fever, chills, nausea, vomiting, dyspnea or bleeding.  Patient reports family history of malignancy in his mother (lung cancer, former smoker).  He is a current smoker.    LAB WORKUP (8.29.2024) WBC 4.73, Hgb 15.7,   (8.22.2024) WBC 6.27, Hgb 17.8, PLT 8  HCM Colonoscopy overdue Upper endoscopy never done  [de-identified] : 9/4/24 Patient is here for a follow-up visit for ITP.  Since last visit, patient has continued prednisone (1mg/kg) 70mg daily.  He temporarily stopped PPI.  Reviewed most recent CBC, which shows PLTs 206,000.  Patient denies significant bruising, dyspnea, hematuria, PRBRB or other overt bleeding.    9/9/24 Patient is here for a follow-up visit for ITP, accompanied by daughter.  Patient has gradually decreased prednisone (started at 1mg/kg) down 60mg each morning for the last 4 days.  He continues to take PPI.  Reviewed most recent CBC, which shows PLTs 143,000.  Patient denies significant bruising, dyspnea, hematuria, PRBRB, abdominal pain, EtOH use, confusion, petechiae or other overt bleeding.  He reports some issues sleeping at night, which is likely due to high dose steroid; he will try to decrease environmental stimuli and see if sleeping pattern improves as we continue to gradually taper prednisone according to PLT response.

## 2024-09-10 LAB
ALBUMIN SERPL ELPH-MCNC: 3.7 G/DL
ALP BLD-CCNC: 90 U/L
ALT SERPL-CCNC: 158 U/L
ANION GAP SERPL CALC-SCNC: 11 MMOL/L
AST SERPL-CCNC: 92 U/L
BILIRUB DIRECT SERPL-MCNC: 0.4 MG/DL
BILIRUB INDIRECT SERPL-MCNC: 0.7 MG/DL
BILIRUB SERPL-MCNC: 1.1 MG/DL
BUN SERPL-MCNC: 25 MG/DL
CALCIUM SERPL-MCNC: 8.4 MG/DL
CHLORIDE SERPL-SCNC: 100 MMOL/L
CO2 SERPL-SCNC: 25 MMOL/L
CREAT SERPL-MCNC: 1 MG/DL
EGFR: 86 ML/MIN/1.73M2
GLUCOSE SERPL-MCNC: 267 MG/DL
POTASSIUM SERPL-SCNC: 4.5 MMOL/L
PROT SERPL-MCNC: 6.7 G/DL
SODIUM SERPL-SCNC: 136 MMOL/L

## 2024-09-10 NOTE — ED CDU PROVIDER DISPOSITION NOTE - NS_EDPROVIDERDISPOUSERTYPE_ED_A_ED
[Midline] : trachea located in midline position [Normal] : no rashes [de-identified] : Brusing is resolved, incision healing well, no LAD. Attending Attestation (For Attendings USE Only)...

## 2024-09-12 ENCOUNTER — APPOINTMENT (OUTPATIENT)
Age: 59
End: 2024-09-12

## 2024-09-12 ENCOUNTER — LABORATORY RESULT (OUTPATIENT)
Age: 59
End: 2024-09-12

## 2024-09-12 ENCOUNTER — OUTPATIENT (OUTPATIENT)
Dept: OUTPATIENT SERVICES | Facility: HOSPITAL | Age: 59
LOS: 1 days | End: 2024-09-12
Payer: COMMERCIAL

## 2024-09-12 DIAGNOSIS — D69.6 THROMBOCYTOPENIA, UNSPECIFIED: ICD-10-CM

## 2024-09-12 LAB
HCT VFR BLD CALC: 42.5 %
HGB BLD-MCNC: 14.9 G/DL
MCHC RBC-ENTMCNC: 33.9 PG
MCHC RBC-ENTMCNC: 35.1 G/DL
MCV RBC AUTO: 96.8 FL
PLATELET # BLD AUTO: 101 K/UL
PMV BLD: 10.6 FL
RBC # BLD: 4.39 M/UL
RBC # FLD: 12.9 %
WBC # FLD AUTO: 11.45 K/UL

## 2024-09-12 PROCEDURE — 36416 COLLJ CAPILLARY BLOOD SPEC: CPT

## 2024-09-12 PROCEDURE — 85027 COMPLETE CBC AUTOMATED: CPT

## 2024-09-13 NOTE — HISTORY OF PRESENT ILLNESS
[Disease:__________________________] : Disease: [unfilled] [de-identified] : Mr. YOHANNES SNYDER is a 59 year old male here today for evaluation and management of ITP.    YOHANNES is a 59 year old M with PMHx including HTN, EtOH abuse, EMILIANO, who presents to clinic to establish care, accompanied by daughter at initial visit.  He was previously seen by Hematologist, Dr. Karimi back in 2022 for thrombocytopenia but has not had followup since.  He is presently feeling well with no new complaints.  Patient denies fever, chills, nausea, vomiting, dyspnea or bleeding.  Patient reports family history of malignancy in his mother (lung cancer, former smoker).  He is a current smoker.    LAB WORKUP (8.29.2024) WBC 4.73, Hgb 15.7,   (8.22.2024) WBC 6.27, Hgb 17.8, PLT 8  HCM Colonoscopy overdue Upper endoscopy never done  [de-identified] : 9/4/24 Patient is here for a follow-up visit for ITP.  Since last visit, patient has continued prednisone (1mg/kg) 70mg daily.  He temporarily stopped PPI.  Reviewed most recent CBC, which shows PLTs 206,000.  Patient denies significant bruising, dyspnea, hematuria, PRBRB or other overt bleeding.    9/9/24 Patient is here for a follow-up visit for ITP, accompanied by daughter.  Patient has gradually decreased prednisone (started at 1mg/kg) down 60mg each morning for the last 4 days.  He continues to take PPI.  Reviewed most recent CBC, which shows PLTs 143,000.  Patient denies significant bruising, dyspnea, hematuria, PRBRB, abdominal pain, EtOH use, confusion, petechiae or other overt bleeding.  He reports some issues sleeping at night, which is likely due to high dose steroid; he will try to decrease environmental stimuli and see if sleeping pattern improves as we continue to gradually taper prednisone according to PLT response.    9/16/24

## 2024-09-16 ENCOUNTER — APPOINTMENT (OUTPATIENT)
Age: 59
End: 2024-09-16

## 2024-09-16 NOTE — HISTORY OF PRESENT ILLNESS
[de-identified] : Mr. YOHANNES SNYDER is a 59 year old male here today for evaluation and management of ITP.    YOHANNES is a 59 year old M with PMHx including HTN, EtOH abuse, EMILIANO, who presents to clinic to establish care, accompanied by daughter at initial visit.  He was previously seen by Hematologist, Dr. Karimi back in 2022 for thrombocytopenia but has not had followup since.  He is presently feeling well with no new complaints.  Patient denies fever, chills, nausea, vomiting, dyspnea or bleeding.  Patient reports family history of malignancy in his mother (lung cancer, former smoker).  He is a current smoker.    LAB WORKUP (8.29.2024) WBC 4.73, Hgb 15.7,   (8.22.2024) WBC 6.27, Hgb 17.8, PLT 8  HCM Colonoscopy overdue Upper endoscopy never done  [de-identified] : 9/4/24 Patient is here for a follow-up visit for ITP.  Since last visit, patient has continued prednisone (1mg/kg) 70mg daily.  He temporarily stopped PPI.  Reviewed most recent CBC, which shows PLTs 206,000.  Patient denies significant bruising, dyspnea, hematuria, PRBRB or other overt bleeding.    9/9/24 Patient is here for a follow-up visit for ITP, accompanied by daughter.  Patient has gradually decreased prednisone (started at 1mg/kg) down 60mg each morning for the last 4 days.  He continues to take PPI.  Reviewed most recent CBC, which shows PLTs 143,000.  Patient denies significant bruising, dyspnea, hematuria, PRBRB, abdominal pain, EtOH use, confusion, petechiae or other overt bleeding.  He reports some issues sleeping at night, which is likely due to high dose steroid; he will try to decrease environmental stimuli and see if sleeping pattern improves as we continue to gradually taper prednisone according to PLT response.    9/16/24

## 2024-09-18 ENCOUNTER — APPOINTMENT (OUTPATIENT)
Dept: GASTROENTEROLOGY | Facility: CLINIC | Age: 59
End: 2024-09-18

## 2024-09-30 ENCOUNTER — APPOINTMENT (OUTPATIENT)
Age: 59
End: 2024-09-30

## 2024-11-04 NOTE — PATIENT PROFILE ADULT - LIVING ENVIRONMENT
Current patient location: [address] [place] [address] N  [facility] [address]    Is the patient currently in the state of [place]? YES    Visit mode:VIDEO    If the visit is dropped, the patient can be reconnected by: VIDEO VISIT: Send to e-mail at: [email or web address]    Will anyone else be joining the visit? NO  (If patient encounters technical issues they should call [phone])    Are changes needed to the allergy or medication list? Pt stated no changes to allergies and Pt stated no med changes    Are refills needed on medications prescribed by this physician? NO    Rooming Documentation:  Questionnaire(s) completed    Reason for visit: PHILLY PEREIRAF      
no

## 2024-11-14 ENCOUNTER — NON-APPOINTMENT (OUTPATIENT)
Age: 59
End: 2024-11-14

## 2024-11-14 ENCOUNTER — APPOINTMENT (OUTPATIENT)
Dept: UROLOGY | Facility: CLINIC | Age: 59
End: 2024-11-14
Payer: COMMERCIAL

## 2024-11-14 VITALS
HEART RATE: 72 BPM | DIASTOLIC BLOOD PRESSURE: 74 MMHG | SYSTOLIC BLOOD PRESSURE: 115 MMHG | OXYGEN SATURATION: 96 % | HEIGHT: 69 IN | RESPIRATION RATE: 18 BRPM | WEIGHT: 165 LBS | BODY MASS INDEX: 24.44 KG/M2

## 2024-11-14 DIAGNOSIS — R97.20 ELEVATED PROSTATE, SPECIFIC ANTIGEN [PSA]: ICD-10-CM

## 2024-11-14 PROCEDURE — 99204 OFFICE O/P NEW MOD 45 MIN: CPT

## 2025-01-06 ENCOUNTER — APPOINTMENT (OUTPATIENT)
Dept: UROLOGY | Facility: CLINIC | Age: 60
End: 2025-01-06

## 2025-02-10 NOTE — PATIENT PROFILE ADULT - PUBLIC BENEFITS
"[facility] [address] [place] [address] Medicine  Progress Note    Patient Name: Anahy Evnas  MRN: [id number]  Patient Class: IP- Inpatient   Admission Date: 1/19/2025  Length of Stay: 20 days  Attending Physician: Maria E Bernstein MD  Primary Care Provider: Elena Cabrera MD          Principal Problem:Anginal equivalent        HPI:  88 y/o AAF w/ CAD hx PCI, CKD 4, HTN, Afib, AAA, 2nd degree AV Block presents to the ED with complaints of dyspnea.     She was recently admitted to [facility] from 1/13-1/17 per DC summary "Patient admitted for dyspnea on exertion. V/Q scan was completed - no pulmonary embolism. Echo ordered - regional wall motion abnormalities present. Low normal systolic ejection fraction 50-55%. Cardiology was consulted - feel origin of TAYLOR may be cardiac in nature. PET stress done 1/16. Two perfusion abnormalities seen. Interventional cardiology recommending medical management and close follow-up. Amlodipine increased, losartan decreased, and started on imdur."    She returns to ED with concerns of dyspnea on exertion.  She discharged to home after last admission, lives with her daughter.  Her grand-daughter accompanies her today and helps provide history.  Since discharge patient has had limited ADL capability, pt requires assistance w/ ambulation and use of walker, patient has home purewick and bedside commode.  She requires assistance with toileting, bathing, clothing.  She is adherent to medications. Yesterday patient appeared weak and when sitting upright had poor trunk stability would slump over and had poor appetite did not eat much and was noted with intermittent confusion.  Her confusion is described as poor short term memory, will intermittently forget recent events, granddaughter notes that patient usually answers orientation questions appropriately when evaluated by medical teams, but might forget later what was discussed or why she is in the hospital.    She was treated for acute cystitis " during last admit, pan-sensitive E.coli on urine culture received ceftriaxone inpatient and not discharged on any oral antibiotics.     She does report poor sleep, has had difficulty sleeping at night and will nap multiple times per day. No reported non-redirectable agitation.   Prior to this month, grand-daughter notes patient was performing more ADL on her own.  Patient had declined referral to SNF with recent admits, last PT/OT recs for low-intensity therapy, pt is more open to post-acute care if recommended.     In ED tonight pt with bradycardia noted, cardiology consulted, no acute medical management of her bradycardia recommended, she has 2:1 AV block on review of EKG/telemetry tonight, hx of Mobitz 1 2nd Deg AV block in past.       Overview/Hospital Course:  Evaluated by cardiology and imdur increased. Cardiology recommends medical management, continue Imdur, DAPT, and consider Ranolazine for angina. Imdur increased to 60mg.  Patient started ranolazine as per cardiology recs, then later discontinued due to renal function. Patient worked with PT/OT again. Patient is interested in placement option for therapy.  Patient is stable and pending SNF placement.  P2P denial upheld 1/29. Family submitted for fast appeal > successful. Patient has placement at SNF now pending quarantine period for flu.     Recurrent chest pain on 1/31 with slight elevation in trop. Cardiology consulted. Imdur and atorvastatin increased.     Fevering 2/1-2/3. CXR clear. UA negative. +flu. Started on tamiflu 2/3. Intermittently on 1L NC since flu diagnosis. No documented desaturations        Interval History:     No new symptoms  Pending auth    Review of Systems  Objective:     Vital Signs (Most Recent):  Temp: 97.9 °F (36.6 °C) (02/10/25 1136)  Pulse: (!) 55 (02/10/25 1136)  Resp: 18 (02/10/25 1136)  BP: (!) 114/55 (02/10/25 1136)  SpO2: (!) 94 % (02/10/25 1136) Vital Signs (24h Range):  Temp:  [97 °F (36.1 °C)-98.2 °F (36.8 °C)] 97.9 °F  (36.6 °C)  Pulse:  [35-73] 55  Resp:  [17-20] 18  SpO2:  [91 %-97 %] 94 %  BP: (114-186)/(55-84) 114/55     Weight: 73.5 kg (162 lb 0.6 oz)  Body mass index is 25.38 kg/m².    Intake/Output Summary (Last 24 hours) at 2/10/2025 1426  Last data filed at 2/10/2025 0746  Gross per 24 hour   Intake 180 ml   Output 1150 ml   Net -970 ml         Physical Exam  Constitutional:       General: She is not in acute distress.     Appearance: She is not ill-appearing.   Cardiovascular:      Rate and Rhythm: Normal rate.      Heart sounds: Normal heart sounds.   Pulmonary:      Effort: Pulmonary effort is normal.      Breath sounds: Normal breath sounds.   Abdominal:      General: Abdomen is flat.      Palpations: Abdomen is soft.      Tenderness: There is no abdominal tenderness.   Musculoskeletal:      Right lower leg: No edema.      Left lower leg: No edema.   Neurological:      Mental Status: She is alert and oriented to person, place, and time. Mental status is at baseline.             Significant Labs: All pertinent labs within the past 24 hours have been reviewed.    Significant Imaging: I have reviewed all pertinent imaging results/findings within the past 24 hours.    Assessment and Plan     * Anginal equivalent  Seen by cardiology - her intermittent dyspnea symptoms are considered an anginal equivalent.  Recommendation to titrate Imdur dosage to 60mg. BP now borderline hypotensive with this increased. Patient took AM imdur 30mg, was hypertensive on presentation - gave 30mg imdur 1/19 and started 60mg in AM.  Recurrent chest pain on 1/31 with trop of 71. Discussed with cardiology. Continuing medical management.   - cardiology signed off  - PT/OT consulted  - increase imdur to 90mg on 2/1  - increase atorvastatin to 80mg   - ranolazine discontinued due to renal function    Influenza A  Fever, cough, malaise. Flu A positive 2/3. CXR clear  - tamiflu started 2/3  - supportive care      Febrile  Temp to 101 on 2/1. No  leukocytosis or tachycardia. + cough, wheeze > see flu       Moderate protein-calorie malnutrition  Nutrition consulted. Most recent weight and BMI monitored-     Measurements:  Wt Readings from Last 1 Encounters:   02/03/25 73.5 kg (162 lb 0.6 oz)   Body mass index is 25.38 kg/m².    Patient has been screened and assessed by RD.    Malnutrition Type:  Context: acute illness or injury  Level: moderate    Malnutrition Characteristic Summary:  Weight Loss (Malnutrition): 1-2% in 1 week (-4% x 2 weeks)  Subcutaneous Fat (Malnutrition): mild depletion  Muscle Mass (Malnutrition): mild depletion    Interventions/Recommendations (treatment strategy):  1.)      Debility  Patient with Acute debility due to age-related physical debility and cardiac disease . The patient's latest AMPAC (Activity Measure for Post Acute Care) Score is listed below.    AM-PAC Score - How much help does the patient need for each activity listed  Basic Mobility Total Score: 17  Turning over in bed (including adjusting bedclothes, sheets and blankets)?: A little  Sitting down on and standing up from a chair with arms (e.g., wheelchair, bedside commode, etc.): A little  Moving from lying on back to sitting on the side of the bed?: A little  Moving to and from a bed to a chair (including a wheelchair)?: A little  Need to walk in hospital room?: A little  Climbing 3-5 steps with a railing?: A lot    Plan  - Progressive mobility protocol initated  - PT/OT consulted  - Fall precautions in place          Counseling regarding goals of care  Code status discussion as per AV block problem.     Patient has living will and HCPOA documents uploaded, she does not have an LAPOST completed. Would recommend completion of LAPOST prior to hospital discharge.       Confusion  ED indicated pt referred for admit for confusion.  By CAM-ICU testing pt does not have delirium, some disorientation. She may have some underlying cognitive deficits based on grand-daughters  "description. Appears pt with altered sleep wake cycles more recently - schedule melatonin tonight to help normalize sleep-wake cycle, delirium precautions.   Repeat UA appears normal and no persistent symptoms or concerns for ongoing Acute cystitis adequately treated.   - delirium precautions      Acute cystitis without hematuria  Repeat UA appears normal and no persistent symptoms or concerns for ongoing Acute cystitis adequately treated.       Second degree heart block by electrocardiogram (ECG)  Chronic Type 1 2nd degree AV block. Pt having 2:1 AV block atrial rates in 80s and HR in low 40s.  During cardiology evaluation pt with variable 2nd degree AV block. She is not on any AV node blocking agents. Discussed with cards/EP  - continue telemetry monitoring.     Per Dr. Cummings on admit: "Tonight discussed code status with patient - she has been Full Code and DNR during last 2 admits, initially indicated DNR but also states she would want resuscitative measures if she might not incur injury, reviewed probabilities with pt given advanced age and chronic medical conditiions indicated to her that under true cardio-respiratory arrest she would be left with subsequent injury and overall low likelihood of survival to discharge 5-10%.  Give this bradyarrhythmia I did discuss if patients HR was lower if she would want invasive measures such as transvenous pacing or transcutaneous pacing performed she responded in affirmative,  will place FULL CODE for now."       Coronary artery disease involving native coronary artery of native heart without angina pectoris  Patient with known CAD , which is uncontrolled Will continue ASA, Plavix, and Statin and monitor for S/Sx of angina/ACS. Continue to monitor on telemetry.     CKD (chronic kidney disease), stage IV  Creatine stable for now. BMP reviewed- noted Estimated Creatinine Clearance: 19.8 mL/min (A) (based on SCr of 1.8 mg/dL (H)). according to latest data. Based on " current GFR, CKD stage is stage 4 - GFR 15-29.  Monitor UOP and serial BMP and adjust therapy as needed. Renally dose meds. Avoid nephrotoxic medications and procedures.    Continue sodium bicarbonate and farxiga    Essential hypertension  Patient's blood pressure range in the last 24 hours was: BP  Min: 102/57  Max: 154/75.The patient's inpatient anti-hypertensive regimen is listed below:  Current Antihypertensives  nitroGLYCERIN SL tablet 0.4 mg, Every 5 min PRN, Sublingual  amLODIPine tablet 5 mg, Daily, Oral  losartan tablet 25 mg, Daily, Oral  amlodipine (NORVASC) tablet, Daily, Oral  isosorbide mononitrate 24 hr tablet 90 mg, Daily, Oral  isosorbide mononitrate (IMDUR) 24 hr tablet, Daily, Oral    Plan  - BP is controlled, no changes needed to their regimen  - adjustments to BP meds     PAF (paroxysmal atrial fibrillation)  Patient has paroxysmal (<7 days) atrial fibrillation. Patient is currently in sinus rhythm. [id number] Score: 4. The patients heart rate in the last 24 hours is as follows:  Pulse  Min: 58  Max: 82     Antiarrhythmics       Anticoagulants  heparin (porcine) injection 5,000 Units, Every 8 hours, Subcutaneous    Plan  - Replete lytes with a goal of K>4, Mg >2  - Patient is not anticoagulated due to unclear etiology  - Patient's afib is currently controlled          VTE Risk Mitigation (From admission, onward)           Ordered     heparin (porcine) injection 5,000 Units  Every 8 hours         01/20/25 0003     IP VTE HIGH RISK PATIENT  Once         01/20/25 0003     Place sequential compression device  Until discontinued         01/20/25 0003                    Discharge Planning   ROSEMARIE: 2/10/2025     Code Status: Full Code   Medical Readiness for Discharge Date:   Discharge Plan A: Skilled Nursing Facility   Discharge Delays: (!) Post-Acute Set-up            Maria E Bernstein MD  Department of Hospital Medicine   [facility] - Observ[facility] 11H     no

## 2025-02-18 NOTE — CONSULT LETTER
Faxed again to [facility]   [Dear  ___] : Dear  [unfilled], [Consult Letter:] : I had the pleasure of evaluating your patient, [unfilled]. [Please see my note below.] : Please see my note below. [Sincerely,] : Sincerely, [FreeTextEntry3] : Kirit Duran NP

## 2025-02-28 ENCOUNTER — INPATIENT (INPATIENT)
Facility: HOSPITAL | Age: 60
LOS: 0 days | Discharge: HOME | DRG: 113 | End: 2025-03-01
Attending: INTERNAL MEDICINE | Admitting: STUDENT IN AN ORGANIZED HEALTH CARE EDUCATION/TRAINING PROGRAM
Payer: COMMERCIAL

## 2025-02-28 VITALS
RESPIRATION RATE: 18 BRPM | HEART RATE: 118 BPM | TEMPERATURE: 98 F | WEIGHT: 160.06 LBS | SYSTOLIC BLOOD PRESSURE: 95 MMHG | OXYGEN SATURATION: 95 % | DIASTOLIC BLOOD PRESSURE: 65 MMHG | HEIGHT: 70 IN

## 2025-02-28 DIAGNOSIS — D69.6 THROMBOCYTOPENIA, UNSPECIFIED: ICD-10-CM

## 2025-02-28 LAB
ALBUMIN SERPL ELPH-MCNC: 4.1 G/DL — SIGNIFICANT CHANGE UP (ref 3.5–5.2)
ALP SERPL-CCNC: 97 U/L — SIGNIFICANT CHANGE UP (ref 30–115)
ALT FLD-CCNC: 36 U/L — SIGNIFICANT CHANGE UP (ref 0–41)
ANION GAP SERPL CALC-SCNC: 17 MMOL/L — HIGH (ref 7–14)
AST SERPL-CCNC: 46 U/L — HIGH (ref 0–41)
BASOPHILS # BLD AUTO: 0.02 K/UL — SIGNIFICANT CHANGE UP (ref 0–0.2)
BASOPHILS NFR BLD AUTO: 0.3 % — SIGNIFICANT CHANGE UP (ref 0–1)
BILIRUB SERPL-MCNC: 0.8 MG/DL — SIGNIFICANT CHANGE UP (ref 0.2–1.2)
BUN SERPL-MCNC: 19 MG/DL — SIGNIFICANT CHANGE UP (ref 10–20)
CALCIUM SERPL-MCNC: 8.2 MG/DL — LOW (ref 8.4–10.5)
CHLORIDE SERPL-SCNC: 96 MMOL/L — LOW (ref 98–110)
CO2 SERPL-SCNC: 21 MMOL/L — SIGNIFICANT CHANGE UP (ref 17–32)
CREAT SERPL-MCNC: 1.1 MG/DL — SIGNIFICANT CHANGE UP (ref 0.7–1.5)
EGFR: 76 ML/MIN/1.73M2 — SIGNIFICANT CHANGE UP
EGFR: 76 ML/MIN/1.73M2 — SIGNIFICANT CHANGE UP
EOSINOPHIL # BLD AUTO: 0.04 K/UL — SIGNIFICANT CHANGE UP (ref 0–0.7)
EOSINOPHIL NFR BLD AUTO: 0.6 % — SIGNIFICANT CHANGE UP (ref 0–8)
FLUAV AG NPH QL: DETECTED
FLUBV AG NPH QL: SIGNIFICANT CHANGE UP
GLUCOSE SERPL-MCNC: 116 MG/DL — HIGH (ref 70–99)
HCT VFR BLD CALC: 45.7 % — SIGNIFICANT CHANGE UP (ref 42–52)
HGB BLD-MCNC: 16 G/DL — SIGNIFICANT CHANGE UP (ref 14–18)
IMM GRANULOCYTES NFR BLD AUTO: 0.7 % — HIGH (ref 0.1–0.3)
LYMPHOCYTES # BLD AUTO: 1.39 K/UL — SIGNIFICANT CHANGE UP (ref 1.2–3.4)
LYMPHOCYTES # BLD AUTO: 19.7 % — LOW (ref 20.5–51.1)
MCHC RBC-ENTMCNC: 32.3 PG — HIGH (ref 27–31)
MCHC RBC-ENTMCNC: 35 G/DL — SIGNIFICANT CHANGE UP (ref 32–37)
MCV RBC AUTO: 92.3 FL — SIGNIFICANT CHANGE UP (ref 80–94)
MONOCYTES # BLD AUTO: 0.93 K/UL — HIGH (ref 0.1–0.6)
MONOCYTES NFR BLD AUTO: 13.2 % — HIGH (ref 1.7–9.3)
NEUTROPHILS # BLD AUTO: 4.61 K/UL — SIGNIFICANT CHANGE UP (ref 1.4–6.5)
NEUTROPHILS NFR BLD AUTO: 65.5 % — SIGNIFICANT CHANGE UP (ref 42.2–75.2)
NRBC BLD AUTO-RTO: 0 /100 WBCS — SIGNIFICANT CHANGE UP (ref 0–0)
NT-PROBNP SERPL-SCNC: 54 PG/ML — SIGNIFICANT CHANGE UP (ref 0–300)
PLATELET # BLD AUTO: 17 K/UL — CRITICAL LOW (ref 130–400)
PMV BLD: 13.3 FL — HIGH (ref 7.4–10.4)
POTASSIUM SERPL-MCNC: 3.4 MMOL/L — LOW (ref 3.5–5)
POTASSIUM SERPL-SCNC: 3.4 MMOL/L — LOW (ref 3.5–5)
PROT SERPL-MCNC: 6.6 G/DL — SIGNIFICANT CHANGE UP (ref 6–8)
RBC # BLD: 4.95 M/UL — SIGNIFICANT CHANGE UP (ref 4.7–6.1)
RBC # FLD: 12.8 % — SIGNIFICANT CHANGE UP (ref 11.5–14.5)
RSV RNA NPH QL NAA+NON-PROBE: SIGNIFICANT CHANGE UP
SARS-COV-2 RNA SPEC QL NAA+PROBE: SIGNIFICANT CHANGE UP
SODIUM SERPL-SCNC: 134 MMOL/L — LOW (ref 135–146)
TROPONIN T, HIGH SENSITIVITY RESULT: 7 NG/L — SIGNIFICANT CHANGE UP (ref 6–21)
TROPONIN T, HIGH SENSITIVITY RESULT: <6 NG/L — SIGNIFICANT CHANGE UP (ref 6–21)
WBC # BLD: 7.04 K/UL — SIGNIFICANT CHANGE UP (ref 4.8–10.8)
WBC # FLD AUTO: 7.04 K/UL — SIGNIFICANT CHANGE UP (ref 4.8–10.8)

## 2025-02-28 PROCEDURE — 99285 EMERGENCY DEPT VISIT HI MDM: CPT

## 2025-02-28 PROCEDURE — 36415 COLL VENOUS BLD VENIPUNCTURE: CPT

## 2025-02-28 PROCEDURE — 80053 COMPREHEN METABOLIC PANEL: CPT

## 2025-02-28 PROCEDURE — 71045 X-RAY EXAM CHEST 1 VIEW: CPT | Mod: 26

## 2025-02-28 PROCEDURE — 99223 1ST HOSP IP/OBS HIGH 75: CPT

## 2025-02-28 PROCEDURE — 83036 HEMOGLOBIN GLYCOSYLATED A1C: CPT

## 2025-02-28 PROCEDURE — 85027 COMPLETE CBC AUTOMATED: CPT

## 2025-02-28 PROCEDURE — G0378: CPT

## 2025-02-28 PROCEDURE — 80061 LIPID PANEL: CPT

## 2025-02-28 PROCEDURE — 85025 COMPLETE CBC W/AUTO DIFF WBC: CPT

## 2025-02-28 RX ORDER — ONDANSETRON HCL/PF 4 MG/2 ML
4 VIAL (ML) INJECTION EVERY 8 HOURS
Refills: 0 | Status: DISCONTINUED | OUTPATIENT
Start: 2025-02-28 | End: 2025-03-01

## 2025-02-28 RX ORDER — DEXAMETHASONE 0.5 MG/1
40 TABLET ORAL ONCE
Refills: 0 | Status: COMPLETED | OUTPATIENT
Start: 2025-02-28 | End: 2025-02-28

## 2025-02-28 RX ORDER — INFLUENZA A VIRUS A/IDAHO/07/2018 (H1N1) ANTIGEN (MDCK CELL DERIVED, PROPIOLACTONE INACTIVATED, INFLUENZA A VIRUS A/INDIANA/08/2018 (H3N2) ANTIGEN (MDCK CELL DERIVED, PROPIOLACTONE INACTIVATED), INFLUENZA B VIRUS B/SINGAPORE/INFTT-16-0610/2016 ANTIGEN (MDCK CELL DERIVED, PROPIOLACTONE INACTIVATED), INFLUENZA B VIRUS B/IOWA/06/2017 ANTIGEN (MDCK CELL DERIVED, PROPIOLACTONE INACTIVATED) 15; 15; 15; 15 UG/.5ML; UG/.5ML; UG/.5ML; UG/.5ML
0.5 INJECTION, SUSPENSION INTRAMUSCULAR ONCE
Refills: 0 | Status: DISCONTINUED | OUTPATIENT
Start: 2025-02-28 | End: 2025-03-01

## 2025-02-28 RX ORDER — LISINOPRIL 5 MG/1
1 TABLET ORAL
Refills: 0 | DISCHARGE

## 2025-02-28 RX ORDER — LISINOPRIL 5 MG/1
40 TABLET ORAL DAILY
Refills: 0 | Status: DISCONTINUED | OUTPATIENT
Start: 2025-02-28 | End: 2025-03-01

## 2025-02-28 RX ORDER — ACETAMINOPHEN 500 MG/5ML
650 LIQUID (ML) ORAL EVERY 6 HOURS
Refills: 0 | Status: DISCONTINUED | OUTPATIENT
Start: 2025-02-28 | End: 2025-03-01

## 2025-02-28 RX ORDER — OSELTAMIVIR PHOSPHATE 75 MG/1
75 CAPSULE ORAL
Refills: 0 | Status: DISCONTINUED | OUTPATIENT
Start: 2025-02-28 | End: 2025-03-01

## 2025-02-28 RX ORDER — MELATONIN 5 MG
3 TABLET ORAL AT BEDTIME
Refills: 0 | Status: DISCONTINUED | OUTPATIENT
Start: 2025-02-28 | End: 2025-03-01

## 2025-02-28 RX ORDER — AMLODIPINE BESYLATE 10 MG/1
10 TABLET ORAL DAILY
Refills: 0 | Status: DISCONTINUED | OUTPATIENT
Start: 2025-02-28 | End: 2025-02-28

## 2025-02-28 RX ORDER — MAGNESIUM, ALUMINUM HYDROXIDE 200-200 MG
30 TABLET,CHEWABLE ORAL EVERY 4 HOURS
Refills: 0 | Status: DISCONTINUED | OUTPATIENT
Start: 2025-02-28 | End: 2025-03-01

## 2025-02-28 RX ORDER — PREDNISONE 20 MG/1
70 TABLET ORAL DAILY
Refills: 0 | Status: DISCONTINUED | OUTPATIENT
Start: 2025-02-28 | End: 2025-03-01

## 2025-02-28 RX ORDER — SODIUM CHLORIDE 9 G/1000ML
1000 INJECTION, SOLUTION INTRAVENOUS
Refills: 0 | Status: DISCONTINUED | OUTPATIENT
Start: 2025-02-28 | End: 2025-03-01

## 2025-02-28 RX ORDER — FOLIC ACID 1 MG/1
1 TABLET ORAL DAILY
Refills: 0 | Status: DISCONTINUED | OUTPATIENT
Start: 2025-02-28 | End: 2025-03-01

## 2025-02-28 RX ORDER — AMLODIPINE BESYLATE 10 MG/1
1 TABLET ORAL
Refills: 0 | DISCHARGE

## 2025-02-28 RX ADMIN — Medication 40 MILLIGRAM(S): at 21:08

## 2025-02-28 RX ADMIN — DEXAMETHASONE 40 MILLIGRAM(S): 0.5 TABLET ORAL at 21:08

## 2025-02-28 RX ADMIN — Medication 1000 MILLILITER(S): at 16:26

## 2025-02-28 RX ADMIN — SODIUM CHLORIDE 75 MILLILITER(S): 9 INJECTION, SOLUTION INTRAVENOUS at 23:42

## 2025-03-01 ENCOUNTER — TRANSCRIPTION ENCOUNTER (OUTPATIENT)
Age: 60
End: 2025-03-01

## 2025-03-01 VITALS
RESPIRATION RATE: 18 BRPM | HEART RATE: 82 BPM | DIASTOLIC BLOOD PRESSURE: 77 MMHG | SYSTOLIC BLOOD PRESSURE: 127 MMHG | TEMPERATURE: 98 F | OXYGEN SATURATION: 94 %

## 2025-03-01 LAB
A1C WITH ESTIMATED AVERAGE GLUCOSE RESULT: 5.8 % — HIGH (ref 4–5.6)
ALBUMIN SERPL ELPH-MCNC: 4 G/DL — SIGNIFICANT CHANGE UP (ref 3.5–5.2)
ALP SERPL-CCNC: 96 U/L — SIGNIFICANT CHANGE UP (ref 30–115)
ALT FLD-CCNC: 35 U/L — SIGNIFICANT CHANGE UP (ref 0–41)
ANION GAP SERPL CALC-SCNC: 9 MMOL/L — SIGNIFICANT CHANGE UP (ref 7–14)
AST SERPL-CCNC: 41 U/L — SIGNIFICANT CHANGE UP (ref 0–41)
BASOPHILS # BLD AUTO: 0 K/UL — SIGNIFICANT CHANGE UP (ref 0–0.2)
BASOPHILS NFR BLD AUTO: 0 % — SIGNIFICANT CHANGE UP (ref 0–1)
BILIRUB SERPL-MCNC: 0.7 MG/DL — SIGNIFICANT CHANGE UP (ref 0.2–1.2)
BUN SERPL-MCNC: 16 MG/DL — SIGNIFICANT CHANGE UP (ref 10–20)
CALCIUM SERPL-MCNC: 8.7 MG/DL — SIGNIFICANT CHANGE UP (ref 8.4–10.5)
CHLORIDE SERPL-SCNC: 100 MMOL/L — SIGNIFICANT CHANGE UP (ref 98–110)
CHOLEST SERPL-MCNC: 131 MG/DL — SIGNIFICANT CHANGE UP
CO2 SERPL-SCNC: 29 MMOL/L — SIGNIFICANT CHANGE UP (ref 17–32)
CREAT SERPL-MCNC: 1 MG/DL — SIGNIFICANT CHANGE UP (ref 0.7–1.5)
EGFR: 86 ML/MIN/1.73M2 — SIGNIFICANT CHANGE UP
EGFR: 86 ML/MIN/1.73M2 — SIGNIFICANT CHANGE UP
EOSINOPHIL # BLD AUTO: 0 K/UL — SIGNIFICANT CHANGE UP (ref 0–0.7)
EOSINOPHIL NFR BLD AUTO: 0 % — SIGNIFICANT CHANGE UP (ref 0–8)
ESTIMATED AVERAGE GLUCOSE: 120 MG/DL — HIGH (ref 68–114)
GLUCOSE SERPL-MCNC: 186 MG/DL — HIGH (ref 70–99)
HCT VFR BLD CALC: 46 % — SIGNIFICANT CHANGE UP (ref 42–52)
HCT VFR BLD CALC: 48.9 % — SIGNIFICANT CHANGE UP (ref 42–52)
HDLC SERPL-MCNC: 47 MG/DL — SIGNIFICANT CHANGE UP
HGB BLD-MCNC: 16 G/DL — SIGNIFICANT CHANGE UP (ref 14–18)
HGB BLD-MCNC: 16.7 G/DL — SIGNIFICANT CHANGE UP (ref 14–18)
LDLC SERPL-MCNC: 67 MG/DL — SIGNIFICANT CHANGE UP
LIPID PNL WITH DIRECT LDL SERPL: 67 MG/DL — SIGNIFICANT CHANGE UP
LYMPHOCYTES # BLD AUTO: 0.03 K/UL — LOW (ref 1.2–3.4)
LYMPHOCYTES # BLD AUTO: 0.9 % — LOW (ref 20.5–51.1)
MCHC RBC-ENTMCNC: 32.1 PG — HIGH (ref 27–31)
MCHC RBC-ENTMCNC: 32.3 PG — HIGH (ref 27–31)
MCHC RBC-ENTMCNC: 34.2 G/DL — SIGNIFICANT CHANGE UP (ref 32–37)
MCHC RBC-ENTMCNC: 34.8 G/DL — SIGNIFICANT CHANGE UP (ref 32–37)
MCV RBC AUTO: 92.9 FL — SIGNIFICANT CHANGE UP (ref 80–94)
MCV RBC AUTO: 93.9 FL — SIGNIFICANT CHANGE UP (ref 80–94)
MONOCYTES # BLD AUTO: 0.05 K/UL — LOW (ref 0.1–0.6)
MONOCYTES NFR BLD AUTO: 1.8 % — SIGNIFICANT CHANGE UP (ref 1.7–9.3)
NEUTROPHILS # BLD AUTO: 2.38 K/UL — SIGNIFICANT CHANGE UP (ref 1.4–6.5)
NEUTROPHILS NFR BLD AUTO: 83 % — HIGH (ref 42.2–75.2)
NONHDLC SERPL-MCNC: 84 MG/DL — SIGNIFICANT CHANGE UP
NRBC BLD AUTO-RTO: 0 /100 WBCS — SIGNIFICANT CHANGE UP (ref 0–0)
PLATELET # BLD AUTO: 20 K/UL — LOW (ref 130–400)
PLATELET # BLD AUTO: 24 K/UL — LOW (ref 130–400)
PMV BLD: 13.1 FL — HIGH (ref 7.4–10.4)
PMV BLD: 13.6 FL — HIGH (ref 7.4–10.4)
POTASSIUM SERPL-MCNC: 5.4 MMOL/L — HIGH (ref 3.5–5)
POTASSIUM SERPL-SCNC: 5.4 MMOL/L — HIGH (ref 3.5–5)
PROT SERPL-MCNC: 6.4 G/DL — SIGNIFICANT CHANGE UP (ref 6–8)
RBC # BLD: 4.95 M/UL — SIGNIFICANT CHANGE UP (ref 4.7–6.1)
RBC # BLD: 5.21 M/UL — SIGNIFICANT CHANGE UP (ref 4.7–6.1)
RBC # FLD: 12.5 % — SIGNIFICANT CHANGE UP (ref 11.5–14.5)
RBC # FLD: 12.5 % — SIGNIFICANT CHANGE UP (ref 11.5–14.5)
SODIUM SERPL-SCNC: 138 MMOL/L — SIGNIFICANT CHANGE UP (ref 135–146)
TRIGL SERPL-MCNC: 90 MG/DL — SIGNIFICANT CHANGE UP
WBC # BLD: 2.84 K/UL — LOW (ref 4.8–10.8)
WBC # BLD: 4.16 K/UL — LOW (ref 4.8–10.8)
WBC # FLD AUTO: 2.84 K/UL — LOW (ref 4.8–10.8)
WBC # FLD AUTO: 4.16 K/UL — LOW (ref 4.8–10.8)

## 2025-03-01 PROCEDURE — 99238 HOSP IP/OBS DSCHRG MGMT 30/<: CPT

## 2025-03-01 PROCEDURE — 99223 1ST HOSP IP/OBS HIGH 75: CPT

## 2025-03-01 RX ORDER — OSELTAMIVIR PHOSPHATE 75 MG/1
1 CAPSULE ORAL
Qty: 8 | Refills: 0
Start: 2025-03-01 | End: 2025-03-04

## 2025-03-01 RX ORDER — DEXAMETHASONE 0.5 MG/1
2 TABLET ORAL
Qty: 8 | Refills: 0
Start: 2025-03-01 | End: 2025-03-04

## 2025-03-01 RX ADMIN — Medication 40 MILLIGRAM(S): at 06:54

## 2025-03-01 RX ADMIN — OSELTAMIVIR PHOSPHATE 75 MILLIGRAM(S): 75 CAPSULE ORAL at 06:53

## 2025-03-01 RX ADMIN — LISINOPRIL 40 MILLIGRAM(S): 5 TABLET ORAL at 06:54

## 2025-03-01 RX ADMIN — PREDNISONE 70 MILLIGRAM(S): 20 TABLET ORAL at 06:54

## 2025-03-03 ENCOUNTER — NON-APPOINTMENT (OUTPATIENT)
Age: 60
End: 2025-03-03

## 2025-03-06 ENCOUNTER — OUTPATIENT (OUTPATIENT)
Dept: OUTPATIENT SERVICES | Facility: HOSPITAL | Age: 60
LOS: 1 days | End: 2025-03-06
Payer: COMMERCIAL

## 2025-03-06 ENCOUNTER — APPOINTMENT (OUTPATIENT)
Age: 60
End: 2025-03-06
Payer: COMMERCIAL

## 2025-03-06 VITALS
WEIGHT: 162 LBS | DIASTOLIC BLOOD PRESSURE: 74 MMHG | SYSTOLIC BLOOD PRESSURE: 112 MMHG | TEMPERATURE: 97.8 F | HEIGHT: 69.5 IN | BODY MASS INDEX: 23.45 KG/M2 | RESPIRATION RATE: 16 BRPM | OXYGEN SATURATION: 100 % | HEART RATE: 66 BPM

## 2025-03-06 DIAGNOSIS — Z51.81 ENCOUNTER FOR THERAPEUTIC DRUG LVL MONITORING: ICD-10-CM

## 2025-03-06 DIAGNOSIS — D69.6 THROMBOCYTOPENIA, UNSPECIFIED: ICD-10-CM

## 2025-03-06 DIAGNOSIS — D69.3 IMMUNE THROMBOCYTOPENIC PURPURA: ICD-10-CM

## 2025-03-06 DIAGNOSIS — R79.89 OTHER SPECIFIED ABNORMAL FINDINGS OF BLOOD CHEMISTRY: ICD-10-CM

## 2025-03-06 LAB
AUTO BASOPHILS #: 0.05 K/UL
AUTO BASOPHILS %: 0.6 %
AUTO EOSINOPHILS #: 0.24 K/UL
AUTO EOSINOPHILS %: 2.6 %
AUTO IMMATURE GRANULOCYTES #: 0.2 K/UL
AUTO LYMPHOCYTES #: 2.8 K/UL
AUTO LYMPHOCYTES %: 30.9 %
AUTO MONOCYTES #: 1.02 K/UL
AUTO MONOCYTES %: 11.3 %
AUTO NEUTROPHILS #: 4.75 K/UL
AUTO NEUTROPHILS %: 52.4 %
AUTO NRBC #: 0 K/UL
HCT VFR BLD CALC: 45.3 %
HGB BLD-MCNC: 16 G/DL
IMM GRANULOCYTES NFR BLD AUTO: 2.2 %
MAN DIFF?: NORMAL
MCHC RBC-ENTMCNC: 31.9 PG
MCHC RBC-ENTMCNC: 35.3 G/DL
MCV RBC AUTO: 90.2 FL
PLATELET # BLD AUTO: 157 K/UL
PMV BLD AUTO: 0 /100 WBCS
PMV BLD: 10.1 FL
RBC # BLD: 5.02 M/UL
RBC # FLD: 12.7 %
WBC # FLD AUTO: 9.06 K/UL

## 2025-03-06 PROCEDURE — G2211 COMPLEX E/M VISIT ADD ON: CPT

## 2025-03-06 PROCEDURE — 85025 COMPLETE CBC W/AUTO DIFF WBC: CPT

## 2025-03-06 PROCEDURE — 99215 OFFICE O/P EST HI 40 MIN: CPT

## 2025-03-07 DIAGNOSIS — I10 ESSENTIAL (PRIMARY) HYPERTENSION: ICD-10-CM

## 2025-03-07 DIAGNOSIS — I95.9 HYPOTENSION, UNSPECIFIED: ICD-10-CM

## 2025-03-07 DIAGNOSIS — D69.6 THROMBOCYTOPENIA, UNSPECIFIED: ICD-10-CM

## 2025-03-07 DIAGNOSIS — J10.1 INFLUENZA DUE TO OTHER IDENTIFIED INFLUENZA VIRUS WITH OTHER RESPIRATORY MANIFESTATIONS: ICD-10-CM

## 2025-03-07 DIAGNOSIS — F17.210 NICOTINE DEPENDENCE, CIGARETTES, UNCOMPLICATED: ICD-10-CM

## 2025-03-07 DIAGNOSIS — Z79.52 LONG TERM (CURRENT) USE OF SYSTEMIC STEROIDS: ICD-10-CM

## 2025-03-07 DIAGNOSIS — E86.0 DEHYDRATION: ICD-10-CM

## 2025-03-07 DIAGNOSIS — F10.10 ALCOHOL ABUSE, UNCOMPLICATED: ICD-10-CM

## 2025-03-14 ENCOUNTER — OUTPATIENT (OUTPATIENT)
Dept: OUTPATIENT SERVICES | Facility: HOSPITAL | Age: 60
LOS: 1 days | End: 2025-03-14
Payer: COMMERCIAL

## 2025-03-14 ENCOUNTER — APPOINTMENT (OUTPATIENT)
Age: 60
End: 2025-03-14

## 2025-03-14 DIAGNOSIS — D69.6 THROMBOCYTOPENIA, UNSPECIFIED: ICD-10-CM

## 2025-03-14 LAB
AUTO BASOPHILS #: 0.03 K/UL
AUTO BASOPHILS %: 0.4 %
AUTO EOSINOPHILS #: 0.12 K/UL
AUTO EOSINOPHILS %: 1.4 %
AUTO IMMATURE GRANULOCYTES #: 0.04 K/UL
AUTO LYMPHOCYTES #: 2.19 K/UL
AUTO LYMPHOCYTES %: 26.1 %
AUTO MONOCYTES #: 0.79 K/UL
AUTO MONOCYTES %: 9.4 %
AUTO NEUTROPHILS #: 5.22 K/UL
AUTO NEUTROPHILS %: 62.2 %
AUTO NRBC #: 0 K/UL
HCT VFR BLD CALC: 43.2 %
HGB BLD-MCNC: 14.7 G/DL
IMM GRANULOCYTES NFR BLD AUTO: 0.5 %
MAN DIFF?: NORMAL
MCHC RBC-ENTMCNC: 31.4 PG
MCHC RBC-ENTMCNC: 34 G/DL
MCV RBC AUTO: 92.3 FL
PLATELET # BLD AUTO: 143 K/UL
PMV BLD AUTO: 0 /100 WBCS
PMV BLD: 9.3 FL
RBC # BLD: 4.68 M/UL
RBC # FLD: 12.9 %
WBC # FLD AUTO: 8.39 K/UL

## 2025-03-14 PROCEDURE — 85025 COMPLETE CBC W/AUTO DIFF WBC: CPT

## 2025-03-14 PROCEDURE — 36416 COLLJ CAPILLARY BLOOD SPEC: CPT

## 2025-03-20 ENCOUNTER — APPOINTMENT (OUTPATIENT)
Age: 60
End: 2025-03-20

## 2025-03-24 ENCOUNTER — OUTPATIENT (OUTPATIENT)
Dept: OUTPATIENT SERVICES | Facility: HOSPITAL | Age: 60
LOS: 1 days | End: 2025-03-24
Payer: COMMERCIAL

## 2025-03-24 ENCOUNTER — APPOINTMENT (OUTPATIENT)
Age: 60
End: 2025-03-24
Payer: COMMERCIAL

## 2025-03-24 VITALS
WEIGHT: 163 LBS | RESPIRATION RATE: 16 BRPM | TEMPERATURE: 97.9 F | DIASTOLIC BLOOD PRESSURE: 89 MMHG | HEART RATE: 88 BPM | SYSTOLIC BLOOD PRESSURE: 157 MMHG | BODY MASS INDEX: 23.73 KG/M2

## 2025-03-24 DIAGNOSIS — D69.6 THROMBOCYTOPENIA, UNSPECIFIED: ICD-10-CM

## 2025-03-24 DIAGNOSIS — D69.3 IMMUNE THROMBOCYTOPENIC PURPURA: ICD-10-CM

## 2025-03-24 PROCEDURE — 99215 OFFICE O/P EST HI 40 MIN: CPT

## 2025-03-24 PROCEDURE — 85025 COMPLETE CBC W/AUTO DIFF WBC: CPT

## 2025-03-24 PROCEDURE — G2211 COMPLEX E/M VISIT ADD ON: CPT

## 2025-03-27 ENCOUNTER — APPOINTMENT (OUTPATIENT)
Age: 60
End: 2025-03-27

## 2025-03-31 LAB
AUTO BASOPHILS #: 0.02 K/UL
AUTO BASOPHILS %: 0.2 %
AUTO EOSINOPHILS #: 0.3 K/UL
AUTO EOSINOPHILS %: 3.6 %
AUTO IMMATURE GRANULOCYTES #: 0.02 K/UL
AUTO LYMPHOCYTES #: 1.88 K/UL
AUTO LYMPHOCYTES %: 22.5 %
AUTO MONOCYTES #: 0.9 K/UL
AUTO MONOCYTES %: 10.8 %
AUTO NEUTROPHILS #: 5.24 K/UL
AUTO NEUTROPHILS %: 62.7 %
AUTO NRBC #: 0 K/UL
HCT VFR BLD CALC: 43.1 %
HGB BLD-MCNC: 14.9 G/DL
IMM GRANULOCYTES NFR BLD AUTO: 0.2 %
IMMATURE PLATELET FRACTION #: 3 K/UL
IMMATURE PLATELET FRACTION %: 4.5 %
MAN DIFF?: NORMAL
MCHC RBC-ENTMCNC: 31.9 PG
MCHC RBC-ENTMCNC: 34.6 G/DL
MCV RBC AUTO: 92.3 FL
PLATELET # BLD AUTO: 67 K/UL
PMV BLD AUTO: 0 /100 WBCS
PMV BLD: 10 FL
RBC # BLD: 4.67 M/UL
RBC # FLD: 13.1 %
WBC # FLD AUTO: 8.36 K/UL

## 2025-04-02 ENCOUNTER — OUTPATIENT (OUTPATIENT)
Dept: OUTPATIENT SERVICES | Facility: HOSPITAL | Age: 60
LOS: 1 days | End: 2025-04-02
Payer: COMMERCIAL

## 2025-04-02 ENCOUNTER — RESULT REVIEW (OUTPATIENT)
Age: 60
End: 2025-04-02

## 2025-04-02 DIAGNOSIS — Z00.8 ENCOUNTER FOR OTHER GENERAL EXAMINATION: ICD-10-CM

## 2025-04-02 DIAGNOSIS — D69.3 IMMUNE THROMBOCYTOPENIC PURPURA: ICD-10-CM

## 2025-04-02 PROCEDURE — 74183 MRI ABD W/O CNTR FLWD CNTR: CPT | Mod: 26

## 2025-04-02 PROCEDURE — A9579: CPT

## 2025-04-02 PROCEDURE — 74183 MRI ABD W/O CNTR FLWD CNTR: CPT

## 2025-04-03 DIAGNOSIS — D69.3 IMMUNE THROMBOCYTOPENIC PURPURA: ICD-10-CM

## 2025-04-09 ENCOUNTER — APPOINTMENT (OUTPATIENT)
Age: 60
End: 2025-04-09

## 2025-05-02 ENCOUNTER — APPOINTMENT (OUTPATIENT)
Age: 60
End: 2025-05-02

## 2025-05-16 ENCOUNTER — APPOINTMENT (OUTPATIENT)
Age: 60
End: 2025-05-16

## 2025-05-20 ENCOUNTER — APPOINTMENT (OUTPATIENT)
Age: 60
End: 2025-05-20